# Patient Record
Sex: FEMALE | Race: WHITE | NOT HISPANIC OR LATINO | Employment: UNEMPLOYED | URBAN - METROPOLITAN AREA
[De-identification: names, ages, dates, MRNs, and addresses within clinical notes are randomized per-mention and may not be internally consistent; named-entity substitution may affect disease eponyms.]

---

## 2018-03-26 ENCOUNTER — HISTORICAL (OUTPATIENT)
Dept: ADMINISTRATIVE | Facility: HOSPITAL | Age: 43
End: 2018-03-26

## 2018-08-07 ENCOUNTER — HISTORICAL (OUTPATIENT)
Dept: ADMINISTRATIVE | Facility: HOSPITAL | Age: 43
End: 2018-08-07

## 2018-08-07 LAB
ABS NEUT (OLG): 6.3
ALBUMIN SERPL-MCNC: 4.2 GM/DL (ref 3.4–5)
ALBUMIN/GLOB SERPL: 1.68 {RATIO} (ref 1.5–2.5)
ALP SERPL-CCNC: 72 UNIT/L (ref 38–126)
ALT SERPL-CCNC: 22 UNIT/L (ref 7–52)
APPEARANCE, UA: NORMAL
AST SERPL-CCNC: 16 UNIT/L (ref 15–37)
BACTERIA #/AREA URNS AUTO: NORMAL /HPF
BILIRUB SERPL-MCNC: 0.4 MG/DL (ref 0.2–1)
BILIRUB UR QL STRIP: NEGATIVE MG/DL
BILIRUBIN DIRECT+TOT PNL SERPL-MCNC: 0.1 MG/DL (ref 0–0.5)
BILIRUBIN DIRECT+TOT PNL SERPL-MCNC: 0.3 MG/DL
BUN SERPL-MCNC: 16 MG/DL (ref 7–18)
CALCIUM SERPL-MCNC: 8.5 MG/DL (ref 8.5–10)
CHLORIDE SERPL-SCNC: 107 MMOL/L (ref 98–107)
CHOLEST SERPL-MCNC: 265 MG/DL (ref 0–200)
CHOLEST/HDLC SERPL: 5.9 {RATIO}
CO2 SERPL-SCNC: 23 MMOL/L (ref 21–32)
COLOR UR: YELLOW
CREAT SERPL-MCNC: 0.86 MG/DL (ref 0.6–1.3)
ERYTHROCYTE [DISTWIDTH] IN BLOOD BY AUTOMATED COUNT: 12.9 % (ref 11.5–17)
GLOBULIN SER-MCNC: 2.6 GM/DL (ref 1.2–3)
GLUCOSE (UA): NEGATIVE MG/DL
GLUCOSE SERPL-MCNC: 111 MG/DL (ref 74–106)
HCT VFR BLD AUTO: 42.7 % (ref 37–47)
HDLC SERPL-MCNC: 45 MG/DL (ref 35–60)
HGB BLD-MCNC: 14.4 GM/DL (ref 12–16)
HGB UR QL STRIP: NEGATIVE UNIT/L
KETONES UR QL STRIP: NEGATIVE MG/DL
LDLC SERPL CALC-MCNC: 153 MG/DL (ref 0–129)
LEUKOCYTE ESTERASE UR QL STRIP: NEGATIVE UNIT/L
LYMPHOCYTES # BLD AUTO: 3.1 X10(3)/MCL (ref 0.6–3.4)
LYMPHOCYTES NFR BLD AUTO: 31.5 % (ref 13–40)
MCH RBC QN AUTO: 31.6 PG (ref 27–31.2)
MCHC RBC AUTO-ENTMCNC: 34 GM/DL (ref 32–36)
MCV RBC AUTO: 94 FL (ref 80–94)
MONOCYTES # BLD AUTO: 0.5 X10(3)/MCL (ref 0–1.8)
MONOCYTES NFR BLD AUTO: 5.1 % (ref 0.1–24)
NEUTROPHILS NFR BLD AUTO: 63.4 % (ref 47–80)
NITRITE UR QL STRIP.AUTO: NEGATIVE
PH UR STRIP: 6 [PH]
PLATELET # BLD AUTO: 181 X10(3)/MCL (ref 130–400)
PMV BLD AUTO: 10.6 FL
POTASSIUM SERPL-SCNC: 4.8 MMOL/L (ref 3.5–5.1)
PROT SERPL-MCNC: 6.7 GM/DL (ref 6.4–8.2)
PROT UR QL STRIP: NEGATIVE MG/DL
RBC # BLD AUTO: 4.55 X10(6)/MCL (ref 4.2–5.4)
RBC #/AREA URNS HPF: NORMAL /HPF
SODIUM SERPL-SCNC: 141 MMOL/L (ref 136–145)
SP GR UR STRIP: 1.02
SQUAMOUS EPITHELIAL, UA: NORMAL /LPF
TRIGL SERPL-MCNC: 280 MG/DL (ref 30–150)
TSH SERPL-ACNC: 1.68 MIU/ML (ref 0.35–4.94)
UROBILINOGEN UR STRIP-ACNC: 0.2 MG/DL
VLDLC SERPL CALC-MCNC: 56 MG/DL
WBC # SPEC AUTO: 9.9 X10(3)/MCL (ref 4.5–11.5)
WBC #/AREA URNS AUTO: NORMAL /[HPF]

## 2019-10-08 ENCOUNTER — HISTORICAL (OUTPATIENT)
Dept: ADMINISTRATIVE | Facility: HOSPITAL | Age: 44
End: 2019-10-08

## 2019-10-08 LAB
BUN SERPL-MCNC: 11 MG/DL (ref 7–18)
CALCIUM SERPL-MCNC: 9.2 MG/DL (ref 8.5–10)
CHLORIDE SERPL-SCNC: 103 MMOL/L (ref 98–107)
CO2 SERPL-SCNC: 31 MMOL/L (ref 21–32)
CREAT SERPL-MCNC: 0.93 MG/DL (ref 0.6–1.3)
CREAT/UREA NIT SERPL: 11.8
GLUCOSE SERPL-MCNC: 121 MG/DL (ref 74–106)
POTASSIUM SERPL-SCNC: 4.6 MMOL/L (ref 3.5–5.1)
SODIUM SERPL-SCNC: 140 MMOL/L (ref 136–145)

## 2020-11-04 ENCOUNTER — HISTORICAL (OUTPATIENT)
Dept: ADMINISTRATIVE | Facility: HOSPITAL | Age: 45
End: 2020-11-04

## 2020-11-04 LAB
ALBUMIN SERPL-MCNC: 3.5 GM/DL (ref 3.5–5)
ALBUMIN/GLOB SERPL: 0.9 RATIO (ref 1.1–2)
ALP SERPL-CCNC: 102 UNIT/L (ref 40–150)
ALT SERPL-CCNC: 27 UNIT/L (ref 0–55)
AST SERPL-CCNC: 15 UNIT/L (ref 5–34)
BILIRUB SERPL-MCNC: 0.4 MG/DL
BILIRUBIN DIRECT+TOT PNL SERPL-MCNC: 0.1 MG/DL (ref 0–0.5)
BILIRUBIN DIRECT+TOT PNL SERPL-MCNC: 0.3 MG/DL (ref 0–0.8)
BUN SERPL-MCNC: 11 MG/DL (ref 7–18.7)
CALCIUM SERPL-MCNC: 9.1 MG/DL (ref 8.4–10.2)
CHLORIDE SERPL-SCNC: 106 MMOL/L (ref 98–107)
CO2 SERPL-SCNC: 27 MMOL/L (ref 22–29)
CREAT SERPL-MCNC: 0.81 MG/DL (ref 0.55–1.02)
GLOBULIN SER-MCNC: 3.7 GM/DL (ref 2.4–3.5)
GLUCOSE SERPL-MCNC: 93 MG/DL (ref 74–100)
POTASSIUM SERPL-SCNC: 4.1 MMOL/L (ref 3.5–5.1)
PROT SERPL-MCNC: 7.2 GM/DL (ref 6.4–8.3)
SODIUM SERPL-SCNC: 140 MMOL/L (ref 136–145)
T4 FREE SERPL-MCNC: 0.88 NG/DL (ref 0.7–1.48)
TSH SERPL-ACNC: 1.42 UIU/ML (ref 0.35–4.94)

## 2020-11-30 ENCOUNTER — HISTORICAL (OUTPATIENT)
Dept: ADMINISTRATIVE | Facility: HOSPITAL | Age: 45
End: 2020-11-30

## 2020-11-30 LAB
ABS NEUT (OLG): 4.94 X10(3)/MCL (ref 2.1–9.2)
BASOPHILS # BLD AUTO: 0 X10(3)/MCL (ref 0–0.2)
BASOPHILS NFR BLD AUTO: 0 %
CHOLEST SERPL-MCNC: 197 MG/DL
CHOLEST/HDLC SERPL: 6 {RATIO} (ref 0–5)
DEPRECATED CALCIDIOL+CALCIFEROL SERPL-MC: 32.2 NG/ML (ref 30–80)
EOSINOPHIL # BLD AUTO: 0.1 X10(3)/MCL (ref 0–0.9)
EOSINOPHIL NFR BLD AUTO: 1 %
ERYTHROCYTE [DISTWIDTH] IN BLOOD BY AUTOMATED COUNT: 12.2 % (ref 11.5–14.5)
EST. AVERAGE GLUCOSE BLD GHB EST-MCNC: 111.2 MG/DL
HBA1C MFR BLD: 5.5 %
HCT VFR BLD AUTO: 45 % (ref 35–46)
HDLC SERPL-MCNC: 33 MG/DL (ref 35–60)
HGB BLD-MCNC: 14.7 GM/DL (ref 12–16)
IMM GRANULOCYTES # BLD AUTO: 0.02 10*3/UL
IMM GRANULOCYTES NFR BLD AUTO: 0 %
LDLC SERPL CALC-MCNC: 111 MG/DL (ref 50–140)
LYMPHOCYTES # BLD AUTO: 2.4 X10(3)/MCL (ref 0.6–4.6)
LYMPHOCYTES NFR BLD AUTO: 30 %
MCH RBC QN AUTO: 31.2 PG (ref 26–34)
MCHC RBC AUTO-ENTMCNC: 32.7 GM/DL (ref 31–37)
MCV RBC AUTO: 95.5 FL (ref 80–100)
MONOCYTES # BLD AUTO: 0.5 X10(3)/MCL (ref 0.1–1.3)
MONOCYTES NFR BLD AUTO: 6 %
NEUTROPHILS # BLD AUTO: 4.94 X10(3)/MCL (ref 2.1–9.2)
NEUTROPHILS NFR BLD AUTO: 62 %
PLATELET # BLD AUTO: 216 X10(3)/MCL (ref 130–400)
PMV BLD AUTO: 10.7 FL (ref 7.4–10.4)
RBC # BLD AUTO: 4.71 X10(6)/MCL (ref 4–5.2)
TRIGL SERPL-MCNC: 264 MG/DL (ref 37–140)
VLDLC SERPL CALC-MCNC: 53 MG/DL
WBC # SPEC AUTO: 8 X10(3)/MCL (ref 4.5–11)

## 2021-02-25 LAB
HUMAN PAPILLOMAVIRUS (HPV): NORMAL
PAP RECOMMENDATION EXT: ABNORMAL
PAP SMEAR: ABNORMAL

## 2021-03-29 ENCOUNTER — HISTORICAL (OUTPATIENT)
Dept: INTERNAL MEDICINE | Facility: CLINIC | Age: 46
End: 2021-03-29

## 2021-03-29 LAB
CHOLEST SERPL-MCNC: 246 MG/DL
CHOLEST/HDLC SERPL: 7 {RATIO} (ref 0–5)
HDLC SERPL-MCNC: 35 MG/DL (ref 35–60)
LDLC SERPL CALC-MCNC: 135 MG/DL (ref 50–140)
TRIGL SERPL-MCNC: 378 MG/DL (ref 37–140)
VLDLC SERPL CALC-MCNC: 76 MG/DL

## 2021-08-01 ENCOUNTER — HISTORICAL (OUTPATIENT)
Dept: LAB | Facility: HOSPITAL | Age: 46
End: 2021-08-01

## 2021-08-01 LAB
ABS NEUT (OLG): 6.6 X10(3)/MCL (ref 2.1–9.2)
ALBUMIN SERPL-MCNC: 4.1 GM/DL (ref 3.5–5)
ALBUMIN/GLOB SERPL: 1.1 RATIO (ref 1.1–2)
ALP SERPL-CCNC: 111 UNIT/L (ref 40–150)
ALT SERPL-CCNC: 42 UNIT/L (ref 0–55)
APPEARANCE, UA: NORMAL
AST SERPL-CCNC: 23 UNIT/L (ref 5–34)
BACTERIA #/AREA URNS AUTO: ABNORMAL /HPF
BASOPHILS # BLD AUTO: 0 X10(3)/MCL (ref 0–0.2)
BASOPHILS NFR BLD AUTO: 0 %
BILIRUB SERPL-MCNC: 0.5 MG/DL
BILIRUB UR QL STRIP: NEGATIVE
BILIRUBIN DIRECT+TOT PNL SERPL-MCNC: 0.2 MG/DL (ref 0–0.5)
BILIRUBIN DIRECT+TOT PNL SERPL-MCNC: 0.3 MG/DL (ref 0–0.8)
BUN SERPL-MCNC: 11.9 MG/DL (ref 7–18.7)
CALCIUM SERPL-MCNC: 10.1 MG/DL (ref 8.4–10.2)
CHLORIDE SERPL-SCNC: 105 MMOL/L (ref 98–107)
CHOLEST SERPL-MCNC: 240 MG/DL
CHOLEST/HDLC SERPL: 6 {RATIO} (ref 0–5)
CO2 SERPL-SCNC: 28 MMOL/L (ref 22–29)
COLOR UR: YELLOW
CREAT SERPL-MCNC: 0.91 MG/DL (ref 0.55–1.02)
EOSINOPHIL # BLD AUTO: 0.1 X10(3)/MCL (ref 0–0.9)
EOSINOPHIL NFR BLD AUTO: 1 %
ERYTHROCYTE [DISTWIDTH] IN BLOOD BY AUTOMATED COUNT: 13.5 % (ref 11.5–14.5)
GLOBULIN SER-MCNC: 3.7 GM/DL (ref 2.4–3.5)
GLUCOSE (UA): NEGATIVE
GLUCOSE SERPL-MCNC: 115 MG/DL (ref 74–100)
HCT VFR BLD AUTO: 44 % (ref 35–46)
HDLC SERPL-MCNC: 37 MG/DL (ref 35–60)
HGB BLD-MCNC: 14.7 GM/DL (ref 12–16)
HGB UR QL STRIP: NEGATIVE
HYALINE CASTS #/AREA URNS LPF: ABNORMAL /LPF
IMM GRANULOCYTES # BLD AUTO: 0.02 10*3/UL
IMM GRANULOCYTES NFR BLD AUTO: 0 %
KETONES UR QL STRIP: NEGATIVE
LDLC SERPL CALC-MCNC: 132 MG/DL (ref 50–140)
LEUKOCYTE ESTERASE UR QL STRIP: NEGATIVE
LYMPHOCYTES # BLD AUTO: 2.4 X10(3)/MCL (ref 0.6–4.6)
LYMPHOCYTES NFR BLD AUTO: 25 %
MCH RBC QN AUTO: 31.3 PG (ref 26–34)
MCHC RBC AUTO-ENTMCNC: 33.4 GM/DL (ref 31–37)
MCV RBC AUTO: 93.8 FL (ref 80–100)
MONOCYTES # BLD AUTO: 0.6 X10(3)/MCL (ref 0.1–1.3)
MONOCYTES NFR BLD AUTO: 6 %
MUCOUS THREADS URNS QL MICRO: ABNORMAL
NEUTROPHILS # BLD AUTO: 6.6 X10(3)/MCL (ref 2.1–9.2)
NEUTROPHILS NFR BLD AUTO: 67 %
NITRITE UR QL STRIP: NEGATIVE
NRBC BLD AUTO-RTO: 0 % (ref 0–0.2)
PH UR STRIP: 6 [PH] (ref 4.5–8)
PLATELET # BLD AUTO: 211 X10(3)/MCL (ref 130–400)
PMV BLD AUTO: 10.3 FL (ref 7.4–10.4)
POTASSIUM SERPL-SCNC: 4.7 MMOL/L (ref 3.5–5.1)
PROT SERPL-MCNC: 7.8 GM/DL (ref 6.4–8.3)
PROT UR QL STRIP: NEGATIVE
RBC # BLD AUTO: 4.69 X10(6)/MCL (ref 4–5.2)
RBC #/AREA URNS AUTO: ABNORMAL /HPF
SODIUM SERPL-SCNC: 140 MMOL/L (ref 136–145)
SP GR UR STRIP: 1.02 (ref 1–1.03)
SQUAMOUS #/AREA URNS LPF: >100 /LPF
TRIGL SERPL-MCNC: 357 MG/DL (ref 37–140)
UROBILINOGEN UR STRIP-ACNC: NORMAL
VLDLC SERPL CALC-MCNC: 71 MG/DL
WBC # SPEC AUTO: 9.8 X10(3)/MCL (ref 4.5–11)
WBC #/AREA URNS AUTO: ABNORMAL /HPF

## 2021-08-16 ENCOUNTER — HISTORICAL (OUTPATIENT)
Dept: RADIOLOGY | Facility: HOSPITAL | Age: 46
End: 2021-08-16

## 2022-04-10 ENCOUNTER — HISTORICAL (OUTPATIENT)
Dept: ADMINISTRATIVE | Facility: HOSPITAL | Age: 47
End: 2022-04-10
Payer: MEDICAID

## 2022-04-24 VITALS
SYSTOLIC BLOOD PRESSURE: 100 MMHG | OXYGEN SATURATION: 98 % | DIASTOLIC BLOOD PRESSURE: 81 MMHG | BODY MASS INDEX: 38.26 KG/M2 | HEIGHT: 68 IN | WEIGHT: 252.44 LBS

## 2022-05-03 NOTE — HISTORICAL [facility]ER
This is a historical note converted from Adrianne. Formatting and pictures may have been removed.  Please reference Adrianne for original formatting and attached multimedia. Chief Complaint  HIGH BLOOD PRESSURE/ EVAL R THUMB  History of Present Illness  The patient is a 43 year old female who presents today with symptoms of musculoskeletal pain?located?in the area?of the?right thumb.? This?is a acute issue.??Quality is aching/sharp.? Severity is?8 out of 10.??Duration of symptoms?is 2 weeks. Symptoms are constant.? The patient report no?weakness. ?The patient reports?no focal neurologic deficits. ?The patient reports no?numbness. ?Injury was reported.??Associated symptoms include pain.? Alleviating and aggrevating factors include, alleviating ice and NSAIDs, aggravating?range of motion.? Prior treatment?over-the-counter has been?with?NSAIDs.?? Other providers?the patient has seen for this issue?include??no one.? Reports opening a trash can lid?and falling forward, at that time her thumb bent all the way back to her wrist?and she possibly felt a pop.? A walk-in clinic diagnosed her with a distal tuft fracture?but she has had continued? pain?at the?proximal metacarpal phalangeal?joint?laterally?to palpation and 2 distraction.  ?   The patient is also?here in clinic for evaluation of hypertension.? The patient reports home blood pressures of 150s over 100s.??She reports being off of her medications ?1 year, which included lisinopril 20 mg daily.??? The patient reports following a low-sodium diet.? The patient reports some cardiovascular exercise implementation.? There is no chest pain or shortness of breath reported with exercise.? She desires a restart of her lisinopril 20 mg.  ?  Review of Systems  Constitutional:?no weight gain,?no weight loss,?no fatigue,?no fever,?no chills,?no weakness,?no trouble sleeping.  Throat:?no bleeding,?no dentures,?no sore tongue,?no dry mouth,?no sore throat,?no hoarseness,?no thrush,?no  non-healing sores.  Cardiovascular:?no chest pain or discomfort,?no tightness,?no palpitations,?no SOB with activity,?no difficulty breathing while supine,?no swelling,?no sudden awakening from sleep with SOB.  Respiratory:??no cough,?no sputum,?no coughing up blood,?no SOB,?no wheezing,?no painful breathing.  Gastrointestinal:?no swallowing difficulty,?no heartburn,?no change in appetite,?no nausea,?no change in bowel habits,?no rectal bleeding,?no constipation,?no diarrhea,?no yellow eyes or skin.  Urinary:?no frequency,?no urgency,?no burning or pain,?no blood in urine,?no incontinence,?no change in urinary strength.  Musculoskeletal:?muscle or joint pain,?stiffness,?primarily?in the R?thumb?no back pain,?no redness of joints,?no swelling of joints,?no trauma.  Skin:?no rashes,?no lumps,?no itching,?no dryness,?color normal for ethnicity,?no hair or nail changes.  Neurologic:?no dizziness,?no fainting,?no seizures,?no weakness,?no numbness,?no tingling,?no tremors..  ?  Physical Exam  Vitals & Measurements  BP:?156/96?  HT:?170?cm? HT:?170?cm? WT:?109?kg? WT:?109?kg? BMI:?37.72?  VITAL SIGNS:? Reviewed.? ?  GENERAL:? In?no apparent distress.? Alert and Oriented x3  HEAD:?No signsof head trauma. Normocephalic  EYES:? Pupils?equal/round/reactive.? Extraocular motionsintact.  EARS:? Hearing?grossly intact. TMs and EAC?clear  MOUTH:? Oropharynx is clear. No erythema. No exudates  NECK:? No LAD. No JVD. No thyromegaly. No bruits  CHEST:? Chest with clear breath sounds bilaterally.? No wheezes, rales, or rhonchi. Good air movement  CARDIAC:? Regular rate and rhythm.? S1 and S2, without murmurs, gallops, or rubs.  VASCULAR:? No Edema.? Peripheral pulses normal and equal in all extremities.  ABDOMEN:? Soft, without detectable tenderness.? No sign of distention.? No? rebound or guarding, and no masses palpated.? ?Bowel Sounds present and normal x 4.  MUSCULOSKELETAL:?Decreased range of motion in flexion?extension and  rotation?of the right thumb secondary to pain.??Bony point tenderness?at distal phalanx of right thumb.??Also,?bony point tenderness?at?medial?side of?the?metacarpal?phalangeal joint.??Positive swelling and mild erythema.? Unable?to?perform?a valgus stress secondary to pain.? 5/5 strength throughout. Extremities without clubbing, cyanosis or edema.  NEUROLOGIC EXAM:? Alert and oriented x 3.? No focal sensory or strength deficits.? ?Speech normal.? Follows commands.  SKIN:? No rash or lesions.?  Assessment/Plan  1.?Benign essential HTN  ?  Essential Hypertension:?Uncontrolled currently secondary to?not being on medication.? The plan is to restart her?past lisinopril 20 mg daily.? She is neurovascularly stable?in our office today  ?   1. ?Advocate 100% compliance?with medication regimen?and?low-sodium diet  2. ?Advocate?increased?cardiovascular exercise as tolerated and educated upon  3.? 10% weight loss goal  4.? Monitor blood pressure?daily?with?an antecubital?digital?cuff  5. ?Follow-up in 6 months for?[wellness]  6. ?Future Lab:?Labs to be drawn at her next wellness?in August  ?  ?  ?  ?  Ordered:  lisinopril, 10 mg = 1 tab(s), Oral, Daily, # 90 tab(s), 1 Refill(s), Pharmacy: [id number]  Office/Outpatient Visit Level 4 Established [id number] , Benign essential HTN  Sprain of right thumb  Closed fracture of right thumb  Gamekeepers thumb, INK AMB - [facility], 03/26/18 17:19:00 CDT  ?  2.?Sprain of right thumb  ?-continue thumb spica splint until reassessed  -Concern?over possible?gamekeepers thumb secondary to mechanism of injury, reported pain in HPI, and physical exam.  -Will refer to orthopedic?hand?for evaluation and definitive care.  Ordered:  Office/Outpatient Visit Level 4 Established [id number] , Benign essential HTN  Sprain of right thumb  Closed fracture of right thumb  Gamekeepers thumb, INK AMB - [facility], 03/26/18 17:19:00 CDT  ?  3.?Closed fracture of right thumb  ?-X-ray displays a tuft fracture  of the distal phalanx.? Nondisplaced/non-angulated  -Thumb spica in place-continue wear ?4-6 weeks  Ordered:  Office/Outpatient Visit Level 4 Established [id number] PC, Benign essential HTN  Sprain of right thumb  Closed fracture of right thumb  Gamekeepers thumb, HLINK AMB - [facility], 03/26/18 17:19:00 CDT  ?  4.?Gamekeepers thumb  ?-Concern?over possible?gamekeepers thumb secondary to mechanism of injury, reported pain in HPI, and physical exam.  -Will refer to orthopedic?hand?for evaluation and definitive care.  Ordered:  External Referral, thumb fx/gamekeepers, ortho hand, aliza haro if possible, 03/26/18 17:19:00 CDT, Gamekeepers thumb  Office/Outpatient Visit Level 4 Established [id number] PC, Benign essential HTN  Sprain of right thumb  Closed fracture of right thumb  Gamekeepers thumb, HLINK AMB - [facility], 03/26/18 17:19:00 CDT  ?   Problem List/Past Medical History  Ongoing  Depression  Obesity  Tobacco user  Historical  No qualifying data  Procedure/Surgical History  Cholecystectomy, tubial.  Medications  lisinopril 10 mg oral tablet, 10 mg= 1 tab(s), Oral, Daily, 1 refills  ProAir HFA 90 mcg/inh inhalation aerosol with adapter, 2 puff(s), INH, q6hr, 5 refills  SERTRALINE TAB 100MG  Allergies  codeine?(swelling)  Social History  Tobacco  Current every day smoker, Cigarettes, 28 per day., 06/11/2017  Family History  Family history is negative

## 2022-05-17 ENCOUNTER — OFFICE VISIT (OUTPATIENT)
Dept: URGENT CARE | Facility: CLINIC | Age: 47
End: 2022-05-17
Payer: MEDICAID

## 2022-05-17 PROCEDURE — 99212 OFFICE O/P EST SF 10 MIN: CPT | Mod: PBBFAC

## 2022-05-24 ENCOUNTER — LAB VISIT (OUTPATIENT)
Dept: LAB | Facility: HOSPITAL | Age: 47
End: 2022-05-24
Payer: MEDICAID

## 2022-05-24 DIAGNOSIS — E78.5 HYPERLIPIDEMIA, UNSPECIFIED HYPERLIPIDEMIA TYPE: ICD-10-CM

## 2022-05-24 DIAGNOSIS — Z13.1 SCREENING FOR DIABETES MELLITUS: Primary | ICD-10-CM

## 2022-05-24 DIAGNOSIS — I10 HYPERTENSION, UNSPECIFIED TYPE: ICD-10-CM

## 2022-05-24 LAB
ALBUMIN SERPL-MCNC: 3.8 GM/DL (ref 3.5–5)
ALBUMIN/GLOB SERPL: 1 RATIO (ref 1.1–2)
ALP SERPL-CCNC: 101 UNIT/L (ref 40–150)
ALT SERPL-CCNC: 41 UNIT/L (ref 0–55)
APPEARANCE UR: CLEAR
AST SERPL-CCNC: 22 UNIT/L (ref 5–34)
BACTERIA #/AREA URNS AUTO: ABNORMAL /HPF
BASOPHILS # BLD AUTO: 0.03 X10(3)/MCL (ref 0–0.2)
BASOPHILS NFR BLD AUTO: 0.3 %
BILIRUB UR QL STRIP.AUTO: NEGATIVE MG/DL
BILIRUBIN DIRECT+TOT PNL SERPL-MCNC: 0.5 MG/DL
BUN SERPL-MCNC: 11.2 MG/DL (ref 7–18.7)
CALCIUM SERPL-MCNC: 9.6 MG/DL (ref 8.4–10.2)
CHLORIDE SERPL-SCNC: 106 MMOL/L (ref 98–107)
CHOLEST SERPL-MCNC: 190 MG/DL
CHOLEST/HDLC SERPL: 5 {RATIO} (ref 0–5)
CO2 SERPL-SCNC: 28 MMOL/L (ref 22–29)
COLOR UR AUTO: YELLOW
CREAT SERPL-MCNC: 0.92 MG/DL (ref 0.55–1.02)
DEPRECATED CALCIDIOL+CALCIFEROL SERPL-MC: 26.3 NG/ML (ref 30–80)
EOSINOPHIL # BLD AUTO: 0.11 X10(3)/MCL (ref 0–0.9)
EOSINOPHIL NFR BLD AUTO: 1.3 %
ERYTHROCYTE [DISTWIDTH] IN BLOOD BY AUTOMATED COUNT: 13.7 % (ref 11.5–17)
EST. AVERAGE GLUCOSE BLD GHB EST-MCNC: 114 MG/DL
GLOBULIN SER-MCNC: 3.7 GM/DL (ref 2.4–3.5)
GLUCOSE SERPL-MCNC: 124 MG/DL (ref 74–100)
GLUCOSE UR QL STRIP.AUTO: NORMAL MG/DL
HBA1C MFR BLD: 5.6 %
HCT VFR BLD AUTO: 45.3 % (ref 37–47)
HDLC SERPL-MCNC: 40 MG/DL (ref 35–60)
HGB BLD-MCNC: 15 GM/DL (ref 12–16)
HYALINE CASTS #/AREA URNS LPF: ABNORMAL /LPF
IMM GRANULOCYTES # BLD AUTO: 0.02 X10(3)/MCL (ref 0–0.02)
IMM GRANULOCYTES NFR BLD AUTO: 0.2 % (ref 0–0.43)
KETONES UR QL STRIP.AUTO: NEGATIVE MG/DL
LDLC SERPL CALC-MCNC: 100 MG/DL (ref 50–140)
LEUKOCYTE ESTERASE UR QL STRIP.AUTO: NEGATIVE UNIT/L
LYMPHOCYTES # BLD AUTO: 2.34 X10(3)/MCL (ref 0.6–4.6)
LYMPHOCYTES NFR BLD AUTO: 27.1 %
MCH RBC QN AUTO: 30.4 PG (ref 27–31)
MCHC RBC AUTO-ENTMCNC: 33.1 MG/DL (ref 33–36)
MCV RBC AUTO: 91.7 FL (ref 80–94)
MONOCYTES # BLD AUTO: 0.45 X10(3)/MCL (ref 0.1–1.3)
MONOCYTES NFR BLD AUTO: 5.2 %
MUCOUS THREADS URNS QL MICRO: ABNORMAL /LPF
NEUTROPHILS # BLD AUTO: 5.7 X10(3)/MCL (ref 2.1–9.2)
NEUTROPHILS NFR BLD AUTO: 65.9 %
NITRITE UR QL STRIP.AUTO: NEGATIVE
NRBC BLD AUTO-RTO: 0 %
PH UR STRIP.AUTO: 5.5 [PH]
PLATELET # BLD AUTO: 181 X10(3)/MCL (ref 130–400)
PMV BLD AUTO: 11 FL (ref 9.4–12.4)
POTASSIUM SERPL-SCNC: 5 MMOL/L (ref 3.5–5.1)
PROT SERPL-MCNC: 7.5 GM/DL (ref 6.4–8.3)
PROT UR QL STRIP.AUTO: NEGATIVE MG/DL
RBC # BLD AUTO: 4.94 X10(6)/MCL (ref 4.2–5.4)
RBC #/AREA URNS AUTO: ABNORMAL /HPF
RBC UR QL AUTO: NEGATIVE UNIT/L
SODIUM SERPL-SCNC: 141 MMOL/L (ref 136–145)
SP GR UR STRIP.AUTO: 1.02
SQUAMOUS #/AREA URNS LPF: ABNORMAL /HPF
TRIGL SERPL-MCNC: 252 MG/DL (ref 37–140)
TSH SERPL-ACNC: 1.79 UIU/ML (ref 0.35–4.94)
UROBILINOGEN UR STRIP-ACNC: NORMAL MG/DL
VLDLC SERPL CALC-MCNC: 50 MG/DL
WBC # SPEC AUTO: 8.6 X10(3)/MCL (ref 4.5–11.5)
WBC #/AREA URNS AUTO: ABNORMAL /HPF

## 2022-05-24 PROCEDURE — 82306 VITAMIN D 25 HYDROXY: CPT

## 2022-05-24 PROCEDURE — 85025 COMPLETE CBC W/AUTO DIFF WBC: CPT

## 2022-05-24 PROCEDURE — 80053 COMPREHEN METABOLIC PANEL: CPT

## 2022-05-24 PROCEDURE — 36415 COLL VENOUS BLD VENIPUNCTURE: CPT

## 2022-05-24 PROCEDURE — 84443 ASSAY THYROID STIM HORMONE: CPT

## 2022-05-24 PROCEDURE — 81001 URINALYSIS AUTO W/SCOPE: CPT

## 2022-05-24 PROCEDURE — 83036 HEMOGLOBIN GLYCOSYLATED A1C: CPT

## 2022-05-24 PROCEDURE — 80061 LIPID PANEL: CPT

## 2022-05-25 ENCOUNTER — OFFICE VISIT (OUTPATIENT)
Dept: INTERNAL MEDICINE | Facility: CLINIC | Age: 47
End: 2022-05-25
Payer: MEDICAID

## 2022-05-25 VITALS
OXYGEN SATURATION: 95 % | HEART RATE: 85 BPM | WEIGHT: 264.75 LBS | TEMPERATURE: 98 F | RESPIRATION RATE: 20 BRPM | BODY MASS INDEX: 40.12 KG/M2 | DIASTOLIC BLOOD PRESSURE: 82 MMHG | SYSTOLIC BLOOD PRESSURE: 118 MMHG | HEIGHT: 68 IN

## 2022-05-25 DIAGNOSIS — I10 HYPERTENSION, UNSPECIFIED TYPE: Primary | ICD-10-CM

## 2022-05-25 DIAGNOSIS — G47.33 OSA (OBSTRUCTIVE SLEEP APNEA): ICD-10-CM

## 2022-05-25 DIAGNOSIS — R10.13 EPIGASTRIC ABDOMINAL PAIN: ICD-10-CM

## 2022-05-25 DIAGNOSIS — E55.9 VITAMIN D INSUFFICIENCY: ICD-10-CM

## 2022-05-25 DIAGNOSIS — E78.1 HYPERTRIGLYCERIDEMIA: ICD-10-CM

## 2022-05-25 DIAGNOSIS — L02.422 FURUNCLE OF LEFT AXILLA: ICD-10-CM

## 2022-05-25 DIAGNOSIS — Z12.11 COLON CANCER SCREENING: ICD-10-CM

## 2022-05-25 DIAGNOSIS — Z72.0 TOBACCO USER: ICD-10-CM

## 2022-05-25 PROCEDURE — 3008F BODY MASS INDEX DOCD: CPT | Mod: CPTII,,,

## 2022-05-25 PROCEDURE — 3079F PR MOST RECENT DIASTOLIC BLOOD PRESSURE 80-89 MM HG: ICD-10-PCS | Mod: CPTII,,,

## 2022-05-25 PROCEDURE — 3074F PR MOST RECENT SYSTOLIC BLOOD PRESSURE < 130 MM HG: ICD-10-PCS | Mod: CPTII,,,

## 2022-05-25 PROCEDURE — 4010F PR ACE/ARB THEARPY RXD/TAKEN: ICD-10-PCS | Mod: CPTII,,,

## 2022-05-25 PROCEDURE — 4010F ACE/ARB THERAPY RXD/TAKEN: CPT | Mod: CPTII,,,

## 2022-05-25 PROCEDURE — 3008F PR BODY MASS INDEX (BMI) DOCUMENTED: ICD-10-PCS | Mod: CPTII,,,

## 2022-05-25 PROCEDURE — 99214 PR OFFICE/OUTPT VISIT, EST, LEVL IV, 30-39 MIN: ICD-10-PCS | Mod: S$PBB,,,

## 2022-05-25 PROCEDURE — 1159F PR MEDICATION LIST DOCUMENTED IN MEDICAL RECORD: ICD-10-PCS | Mod: CPTII,,,

## 2022-05-25 PROCEDURE — 99214 OFFICE O/P EST MOD 30 MIN: CPT | Mod: S$PBB,,,

## 2022-05-25 PROCEDURE — 1160F RVW MEDS BY RX/DR IN RCRD: CPT | Mod: CPTII,,,

## 2022-05-25 PROCEDURE — 3079F DIAST BP 80-89 MM HG: CPT | Mod: CPTII,,,

## 2022-05-25 PROCEDURE — 3074F SYST BP LT 130 MM HG: CPT | Mod: CPTII,,,

## 2022-05-25 PROCEDURE — 1159F MED LIST DOCD IN RCRD: CPT | Mod: CPTII,,,

## 2022-05-25 PROCEDURE — 99215 OFFICE O/P EST HI 40 MIN: CPT | Mod: PBBFAC

## 2022-05-25 PROCEDURE — 1160F PR REVIEW ALL MEDS BY PRESCRIBER/CLIN PHARMACIST DOCUMENTED: ICD-10-PCS | Mod: CPTII,,,

## 2022-05-25 RX ORDER — PRAVASTATIN SODIUM 40 MG/1
40 TABLET ORAL NIGHTLY
Qty: 90 TABLET | Refills: 2 | Status: SHIPPED | OUTPATIENT
Start: 2022-05-25 | End: 2022-12-16 | Stop reason: SDUPTHER

## 2022-05-25 RX ORDER — OXYBUTYNIN CHLORIDE 5 MG/1
5 TABLET ORAL 3 TIMES DAILY PRN
Qty: 60 TABLET | Refills: 1 | Status: SHIPPED | OUTPATIENT
Start: 2022-05-25 | End: 2022-12-16

## 2022-05-25 RX ORDER — LISINOPRIL 10 MG/1
10 TABLET ORAL DAILY
Qty: 90 TABLET | Refills: 2 | Status: SHIPPED | OUTPATIENT
Start: 2022-05-25 | End: 2022-12-16 | Stop reason: SDUPTHER

## 2022-05-25 RX ORDER — ASPIRIN 325 MG
50000 TABLET, DELAYED RELEASE (ENTERIC COATED) ORAL
Qty: 8 CAPSULE | Refills: 0 | Status: SHIPPED | OUTPATIENT
Start: 2022-05-25 | End: 2022-08-25 | Stop reason: ALTCHOICE

## 2022-05-25 RX ORDER — SERTRALINE HYDROCHLORIDE 100 MG/1
200 TABLET, FILM COATED ORAL DAILY
COMMUNITY
Start: 2021-08-11 | End: 2022-05-25 | Stop reason: SDUPTHER

## 2022-05-25 RX ORDER — LISINOPRIL 10 MG/1
10 TABLET ORAL DAILY
COMMUNITY
Start: 2021-08-11 | End: 2022-05-25 | Stop reason: SDUPTHER

## 2022-05-25 RX ORDER — SERTRALINE HYDROCHLORIDE 100 MG/1
200 TABLET, FILM COATED ORAL DAILY
Qty: 90 TABLET | Refills: 2 | Status: SHIPPED | OUTPATIENT
Start: 2022-05-25 | End: 2022-12-16 | Stop reason: SDUPTHER

## 2022-05-25 RX ORDER — OXYBUTYNIN CHLORIDE 5 MG/1
5 TABLET ORAL 3 TIMES DAILY PRN
COMMUNITY
Start: 2021-08-11 | End: 2022-05-25 | Stop reason: SDUPTHER

## 2022-05-25 RX ORDER — ASPIRIN 325 MG
50000 TABLET, DELAYED RELEASE (ENTERIC COATED) ORAL DAILY
Qty: 8 CAPSULE | Refills: 0 | Status: SHIPPED | OUTPATIENT
Start: 2022-05-25 | End: 2022-05-25

## 2022-05-25 RX ORDER — PRAVASTATIN SODIUM 40 MG/1
40 TABLET ORAL DAILY
COMMUNITY
Start: 2021-08-11 | End: 2022-05-25 | Stop reason: SDUPTHER

## 2022-05-25 RX ORDER — FENOFIBRATE 54 MG/1
54 TABLET ORAL DAILY
Qty: 90 TABLET | Refills: 2 | Status: SHIPPED | OUTPATIENT
Start: 2022-05-25 | End: 2022-08-25

## 2022-05-25 RX ORDER — DOXYCYCLINE 100 MG/1
100 CAPSULE ORAL 2 TIMES DAILY
COMMUNITY
Start: 2022-05-17 | End: 2022-05-25

## 2022-05-25 RX ORDER — SULFAMETHOXAZOLE AND TRIMETHOPRIM 800; 160 MG/1; MG/1
1 TABLET ORAL 2 TIMES DAILY
COMMUNITY
Start: 2022-05-17 | End: 2022-05-25

## 2022-05-25 RX ORDER — ALBUTEROL SULFATE 90 UG/1
2 AEROSOL, METERED RESPIRATORY (INHALATION) EVERY 6 HOURS PRN
COMMUNITY
Start: 2022-05-14 | End: 2022-09-15 | Stop reason: SDUPTHER

## 2022-05-25 NOTE — ASSESSMENT & PLAN NOTE
Lab Results   Component Value Date    [id number] 26.3 (L) 05/24/2022     Educated on increasing foods high in Vitamin D such as fish oil, cod liver oil, salmon, milk fortified with vitamin D.  RX Vitamin D3 01012 IU weekly x 8 weeks.  Complete entire 8 weeks of Vitamin D prescription.  After completion of prescription (12 weeks/3 months), begin taking Vitamin D 2000 I.U. tablets daily (purchase over the counter).  Repeat Vitamin D level as ordered.

## 2022-05-25 NOTE — ASSESSMENT & PLAN NOTE
Smoking cessation discussed.   Pt not ready to quit.  Previous referred to Smoking Cessation program and declined LDCT.  Discussed benefits of quitting including improved health, decreased cardiac/vascular/pulmonary/stroke risks as well as saving money.

## 2022-05-25 NOTE — ASSESSMENT & PLAN NOTE
Lab Results   Component Value Date    .00 05/24/2022       Lab Results   Component Value Date    TRIG 252 (H) 05/24/2022       Lab Results   Component Value Date    HDL 40 05/24/2022        Lab Results   Component Value Date    CHOL 190 05/24/2022    Rx Fenofibrate.  Continue pravastatin as prescribed.   Follow a low cholesterol, low saturated fat diet with less than 200 mg of cholesterol a day.   Avoid fried foods and high saturated fats.  Add flax seed or fish oil supplements to diet.   Increase dietary fiber.   Regular exercise improves cholesterol levels.  Physical activity 5 times a week for 30 minutes per day (or 150 minutes per week).   Stressed importance of dietary modifications.

## 2022-05-25 NOTE — ASSESSMENT & PLAN NOTE
Referral to Sleep Lab.  EPWORTH SLEEPINESS SCALE 5/25/2022   Sitting and reading 3   Watching TV 3   Sitting, inactive in a public place (e.g. a theatre or a meeting) 0   As a passenger in a car for an hour without a break 3   Lying down to rest in the afternoon when circumstances permit 3   Sitting and talking to someone 0   Sitting quietly after a lunch without alcohol 0   In a car, while stopped for a few minutes in traffic 0   Total score 12

## 2022-05-25 NOTE — PROGRESS NOTES
Subjective:       Patient ID: Lina Kat is a 47 y.o. female.    Vitals:  vitals were not taken for this visit.     Chief Complaint: No chief complaint on file.    Pt left without being seen    ROS    Objective:      Physical Exam      Assessment:       No diagnosis found.      Plan:         There are no diagnoses linked to this encounter.               This encounter was created in error - please disregard.

## 2022-05-25 NOTE — ASSESSMENT & PLAN NOTE
BP- 118/82 (at goal).  Follow a low sodium (less than 2 grams of sodium per day), DASH diet.   Continue medications as prescribed.  Monitor blood pressure and report any consistent values greater than 140/90 and keep a log.  Encouraged smoking cessation to aid in BP reduction and co-morbidities.   Maintain healthy weight with a BMI goal of <30.   Aerobic exercise for 150 minutes per week (or 5 days a week for 30 minutes each day).

## 2022-05-25 NOTE — ASSESSMENT & PLAN NOTE
Was prescribed doxycycline and bactrim, did not complete regimen.  Referral to Family Medicine (minor surgery).

## 2022-05-25 NOTE — PROGRESS NOTES
"    PATIENT NAME: Lina Kat  : 1975  DATE: 22  MRN: [id number]          Reason for Visit/Chief Complaint   Establish Care, Abdominal Pain (Epigastric), Lab review, and Recurrent Skin Infections (Left axilla)       History of Present Illness (HPI)     Lina Kat is a 47 y.o. female presenting in clinic today Establish Care, Abdominal Pain (Epigastric), Lab review, and Recurrent Skin Infections (Left axilla). Previous PCP-Naye Olivares NP.  PMH of anxiety/depression (controlled), HTN, UBALDO with CPAP, tobacco use.     C/o intermittent upper abd pain with radiation to RUQ. She states she thinks she has an undiagnosed hernia. She says she can see a "bulge" in her upper abdomen when she is in certain positions. She denies any nausea or vomiting.     Triglycerides still elevated at 252. Patient admits to not eating a low fat/low sodium diet. Pravastatin was increased at last office visit, she states she takes pravastatin as prescribed    BP at goal today. She does not check her BP at home. She states she has started walking regularly since it is warmer outside.     Patient states she has a CPAP, but she has not used it in approx 2 years. She says she has not had a sleep study in approximately 10 years. She says the mask is very uncomfortable. Other mask options were discussed, she says she cannot use the nasal appliance because she is a mouth breather. Does endorse feeling excessively sleepy. She is amendable to being referred for another sleep study.     On 2022, patient visited [facility] clinic for a boil under left axilla. She states she was given doxycycline and bactrim. She states they did not tonya boil. She did not complete the antibiotic regimen. She says it made her feel "ugh." She states a friend was able to expel blood and pus from it. Denies fever.     Continues to smoke 1 ppd. Does not desire to stop at this time. Declines LDCT. Denies alcohol or illicit drug use. No vaccines due " today. PatietDenies chest pain, shortness of breath, cough, headache, dizziness, weakness, abdominal pain, nausea, vomiting, diarrhea, constipation, dysuria, depression, anxiety, SI, and HI.    Breast Cancer Screening - 8/16/2021: MMG-Negative. Continue annual screenings.  Cervical Cancer Screening- PAP 2/25/2021 - ASCUS, repeat 1 yr. Next scheduled appt on 9/1/2022.  LDCT - Declined on 12/8/2020.  Osteoporosis Screening - Deferred due to age  Colon Cancer Screening - No prior testing. Cologuard ordered.  Vaccines: Flu 11/30/2020 / Pneumonia 8/7/2018 / Shingles deferred / Tetanus 7/22/2019    Review of Systems     Review of Systems   Constitutional: Positive for activity change. Negative for unexpected weight change.   HENT: Negative for hearing loss, rhinorrhea and trouble swallowing.    Eyes: Negative for discharge and visual disturbance.   Respiratory: Negative for chest tightness and wheezing.    Cardiovascular: Negative for chest pain and palpitations.   Gastrointestinal: Negative for blood in stool, constipation, diarrhea and vomiting.   Endocrine: Negative for polydipsia and polyuria.   Genitourinary: Negative for difficulty urinating, dysuria, hematuria and menstrual problem.   Musculoskeletal: Negative for arthralgias, joint swelling and neck pain.   Skin: Negative.    Allergic/Immunologic: Negative.    Neurological: Positive for headaches. Negative for weakness.   Psychiatric/Behavioral: Negative for confusion and dysphoric mood.       Medical / Social / Family History     Past Medical History:   Diagnosis Date    Anxiety     Depression     Hyperlipidemia     Hypertension     UBALDO (obstructive sleep apnea)          Past Surgical History:   Procedure Laterality Date    CHOLECYSTECTOMY      TUBAL LIGATION           Social History  Lina Roy's  reports that she has been smoking cigarettes. She has a 15.00 pack-year smoking history. She has never used smokeless tobacco. She reports that she does not  drink alcohol and does not use drugs.    Family History  Lina Kat's family history includes Brain cancer in her maternal aunt and paternal uncle; Diabetes type II in her father; Heart attack in her father; Hypertension in her brother and mother.    Medications and Allergies     Medications  Medication List with Changes/Refills   New Medications    CHOLECALCIFEROL, VITAMIN D3, 1,250 MCG (50,000 UNIT) CAPSULE    Take 1 capsule (50,000 Units total) by mouth every 7 days.    FENOFIBRATE (TRICOR) 54 MG TABLET    Take 1 tablet (54 mg total) by mouth once daily.   Current Medications    ALBUTEROL (PROVENTIL/VENTOLIN HFA) 90 MCG/ACTUATION INHALER    Inhale 2 puffs into the lungs every 6 (six) hours as needed.   Changed and/or Refilled Medications    Modified Medication Previous Medication    LISINOPRIL 10 MG TABLET lisinopriL 10 MG tablet       Take 1 tablet (10 mg total) by mouth once daily at 6am.    Take 10 mg by mouth once daily at 6am.    OXYBUTYNIN (DITROPAN) 5 MG TAB oxybutynin (DITROPAN) 5 MG Tab       Take 1 tablet (5 mg total) by mouth 3 (three) times daily as needed (bladder spasm).    Take 5 mg by mouth 3 (three) times daily as needed.    PRAVASTATIN (PRAVACHOL) 40 MG TABLET pravastatin (PRAVACHOL) 40 MG tablet       Take 1 tablet (40 mg total) by mouth every evening.    Take 40 mg by mouth once daily at 6am.    SERTRALINE (ZOLOFT) 100 MG TABLET sertraline (ZOLOFT) 100 MG tablet       Take 2 tablets (200 mg total) by mouth once daily at 6am.    Take 200 mg by mouth once daily at 6am.   Discontinued Medications    DOXYCYCLINE (MONODOX) 100 MG CAPSULE    Take 100 mg by mouth 2 (two) times daily.    SULFAMETHOXAZOLE-TRIMETHOPRIM 800-160MG (BACTRIM DS) 800-160 MG TAB    Take 1 tablet by mouth 2 (two) times daily.         Allergies  Review of patient's allergies indicates:   Allergen Reactions    Codeine Swelling       Physical Examination     Vitals:    05/25/22 1231   BP: 118/82   Pulse: 85   Resp: 20   Temp:  98.2 °F (36.8 °C)     Physical Exam  Constitutional:       Appearance: Normal appearance. She is normal weight.   HENT:      Head: Normocephalic and atraumatic.      Right Ear: External ear normal.      Left Ear: External ear normal.      Nose: Nose normal.      Mouth/Throat:      Mouth: Mucous membranes are moist.      Pharynx: Oropharynx is clear.   Eyes:      Extraocular Movements: Extraocular movements intact.      Conjunctiva/sclera: Conjunctivae normal.      Pupils: Pupils are equal, round, and reactive to light.   Cardiovascular:      Rate and Rhythm: Normal rate and regular rhythm.      Pulses: Normal pulses.      Heart sounds: Normal heart sounds.   Pulmonary:      Effort: Pulmonary effort is normal.      Breath sounds: Normal breath sounds.   Abdominal:      General: Bowel sounds are normal.      Palpations: Abdomen is soft.   Musculoskeletal:         General: Normal range of motion.      Cervical back: Normal range of motion and neck supple.   Skin:     General: Skin is warm and dry.      Capillary Refill: Capillary refill takes less than 2 seconds.          Neurological:      General: No focal deficit present.      Mental Status: She is alert and oriented to person, place, and time.   Psychiatric:         Mood and Affect: Mood normal.         Behavior: Behavior normal.         Thought Content: Thought content normal.         Judgment: Judgment normal.           Results     Lab Results   Component Value Date    WBC 8.6 05/24/2022    RBC 4.94 05/24/2022    HGB 15.0 05/24/2022    HCT 45.3 05/24/2022    MCV 91.7 05/24/2022    MCH 30.4 05/24/2022    MCHC 33.1 05/24/2022    RDW 13.7 05/24/2022     05/24/2022    MPV 11.0 05/24/2022     Lab Results   Component Value Date     05/24/2022    K 5.0 05/24/2022    CO2 28 05/24/2022    BUN 11.2 05/24/2022    CREATININE 0.92 05/24/2022    CALCIUM 9.6 05/24/2022    ALBUMIN 3.8 05/24/2022    BILITOT 0.5 05/24/2022    ALKPHOS 101 05/24/2022    AST 22  05/24/2022    ALT 41 05/24/2022    EGFRIFAFRICA 85 (L) 08/01/2021    EGFRNONAA >60 05/24/2022     Lab Results   Component Value Date    TSH 1.7904 05/24/2022     Lab Results   Component Value Date    CHOL 190 05/24/2022    HDL 40 05/24/2022    .00 05/24/2022    TRIG 252 (H) 05/24/2022     Lab Results   Component Value Date    COLORU Yellow 08/01/2021    APPEARANCEUA Clear 05/24/2022    PHUR 6.0 08/01/2021    GLUCUA Negative 08/01/2021    KETONESU Negative 08/01/2021    OCCULTUA Negative 08/01/2021    NITRITE Negative 08/01/2021    LEUKOCYTESUR Negative 05/24/2022    RBCUA None Seen 05/24/2022    WBCUA 0-5 05/24/2022    BACTERIA None Seen 05/24/2022    HYALINECASTS None Seen 05/24/2022     No results found for: LABMICR, CREATRANDUR  Lab Results   Component Value Date    [id number] 26.3 (L) 05/24/2022     No results found for: HIV, HEPAIGM, HEPBCOREM, HEPBSAG, HEPCAB  No results found for: FITDIAG, COLOGUARD      Assessment and Plan (including Health Maintenance)     Health Maintenance Due   Topic Date Due    Hepatitis C Screening  Never done    Cervical Cancer Screening  Never done    COVID-19 Vaccine (1) Never done    HIV Screening  Never done    Colorectal Cancer Screening  Never done    Pneumococcal Vaccines (Age 0-64) (2 - PCV) 08/07/2019       Problem List Items Addressed This Visit        Cardiac/Vascular    Hypertension - Primary    Current Assessment & Plan     BP- 118/82 (at goal).  Follow a low sodium (less than 2 grams of sodium per day), DASH diet.   Continue medications as prescribed.  Monitor blood pressure and report any consistent values greater than 140/90 and keep a log.  Encouraged smoking cessation to aid in BP reduction and co-morbidities.   Maintain healthy weight with a BMI goal of <30.   Aerobic exercise for 150 minutes per week (or 5 days a week for 30 minutes each day).             Relevant Medications    lisinopriL 10 MG tablet    Hypertriglyceridemia    Current Assessment &  Plan     Lab Results   Component Value Date    .00 05/24/2022       Lab Results   Component Value Date    TRIG 252 (H) 05/24/2022       Lab Results   Component Value Date    HDL 40 05/24/2022        Lab Results   Component Value Date    CHOL 190 05/24/2022    Rx Fenofibrate.  Continue pravastatin as prescribed.   Follow a low cholesterol, low saturated fat diet with less than 200 mg of cholesterol a day.   Avoid fried foods and high saturated fats.  Add flax seed or fish oil supplements to diet.   Increase dietary fiber.   Regular exercise improves cholesterol levels.  Physical activity 5 times a week for 30 minutes per day (or 150 minutes per week).   Stressed importance of dietary modifications.             Relevant Medications    fenofibrate (TRICOR) 54 MG tablet    pravastatin (PRAVACHOL) 40 MG tablet    Other Relevant Orders    Lipid Panel       ID    Furuncle of left axilla    Current Assessment & Plan     Was prescribed doxycycline and bactrim, did not complete regimen.  Referral to Family Medicine (minor surgery).           Relevant Orders    Ambulatory referral/consult to Family Practice       Endocrine    Vitamin D insufficiency    Current Assessment & Plan     Lab Results   Component Value Date    [id number] 26.3 (L) 05/24/2022     Educated on increasing foods high in Vitamin D such as fish oil, cod liver oil, salmon, milk fortified with vitamin D.  RX Vitamin D3 63599 IU weekly x 8 weeks.  Complete entire 8 weeks of Vitamin D prescription.  After completion of prescription (12 weeks/3 months), begin taking Vitamin D 2000 I.U. tablets daily (purchase over the counter).  Repeat Vitamin D level as ordered.             Relevant Medications    cholecalciferol, vitamin D3, 1,250 mcg (50,000 unit) capsule    Other Relevant Orders    Vitamin D       GI    Colon cancer screening    Current Assessment & Plan     Cologuard ordered.           Relevant Orders    Cologuard Screening (Multitarget Stool DNA)     Epigastric abdominal pain    Current Assessment & Plan     CT abd/pelvis ordered.           Relevant Orders    CT Chest Abdomen Pelvis Without Contrast (XPD)       Other    UBALDO (obstructive sleep apnea)    Current Assessment & Plan     Referral to Sleep Lab.  EPWORTH SLEEPINESS SCALE 5/25/2022   Sitting and reading 3   Watching TV 3   Sitting, inactive in a public place (e.g. a theatre or a meeting) 0   As a passenger in a car for an hour without a break 3   Lying down to rest in the afternoon when circumstances permit 3   Sitting and talking to someone 0   Sitting quietly after a lunch without alcohol 0   In a car, while stopped for a few minutes in traffic 0   Total score 12                Relevant Orders    Ambulatory referral/consult to Sleep Disorders    TSH    T4, Free    Tobacco user    Current Assessment & Plan     Smoking cessation discussed.   Pt not ready to quit.  Previous referred to Smoking Cessation program and declined LDCT.  Discussed benefits of quitting including improved health, decreased cardiac/vascular/pulmonary/stroke risks as well as saving money.                   Health Maintenance Topics with due status: Not Due       Topic Last Completion Date    TETANUS VACCINE 07/22/2019    Influenza Vaccine 11/30/2020    Mammogram 08/16/2021    Lipid Panel 05/24/2022       Future Appointments   Date Time Provider Department Center   5/27/2022  3:15 PM [facility] [address] [facility] [place] [facility]   8/25/2022 12:30 PM [facility]   9/1/2022  9:10 AM Faina Weinstein, JAYCEE [facility]            Signature:        RAMIRO Rodgers  [facility]  [address] [id number]    Date of encounter: 5/25/22

## 2022-06-08 ENCOUNTER — PATIENT MESSAGE (OUTPATIENT)
Dept: INTERNAL MEDICINE | Facility: CLINIC | Age: 47
End: 2022-06-08
Payer: MEDICAID

## 2022-06-08 ENCOUNTER — TELEPHONE (OUTPATIENT)
Dept: INTERNAL MEDICINE | Facility: CLINIC | Age: 47
End: 2022-06-08

## 2022-06-08 DIAGNOSIS — G47.33 OSA (OBSTRUCTIVE SLEEP APNEA): Primary | ICD-10-CM

## 2022-06-10 ENCOUNTER — TELEPHONE (OUTPATIENT)
Dept: INTERNAL MEDICINE | Facility: CLINIC | Age: 47
End: 2022-06-10
Payer: MEDICAID

## 2022-06-10 ENCOUNTER — PROCEDURE VISIT (OUTPATIENT)
Dept: SLEEP MEDICINE | Facility: HOSPITAL | Age: 47
End: 2022-06-10
Payer: MEDICAID

## 2022-06-10 DIAGNOSIS — G47.33 OSA (OBSTRUCTIVE SLEEP APNEA): ICD-10-CM

## 2022-06-10 PROCEDURE — 95810 POLYSOM 6/> YRS 4/> PARAM: CPT

## 2022-06-13 ENCOUNTER — TELEPHONE (OUTPATIENT)
Dept: INTERNAL MEDICINE | Facility: CLINIC | Age: 47
End: 2022-06-13

## 2022-06-14 DIAGNOSIS — I10 PRIMARY HYPERTENSION: Primary | ICD-10-CM

## 2022-06-14 DIAGNOSIS — G47.33 OSA (OBSTRUCTIVE SLEEP APNEA): ICD-10-CM

## 2022-06-14 DIAGNOSIS — E66.01 SEVERE OBESITY (BMI >= 40): ICD-10-CM

## 2022-06-15 ENCOUNTER — OFFICE VISIT (OUTPATIENT)
Dept: FAMILY MEDICINE | Facility: CLINIC | Age: 47
End: 2022-06-15
Payer: MEDICAID

## 2022-06-15 VITALS
DIASTOLIC BLOOD PRESSURE: 88 MMHG | SYSTOLIC BLOOD PRESSURE: 133 MMHG | HEIGHT: 68 IN | HEART RATE: 68 BPM | TEMPERATURE: 99 F | BODY MASS INDEX: 40.3 KG/M2 | WEIGHT: 265.88 LBS

## 2022-06-15 DIAGNOSIS — L02.422 FURUNCLE OF LEFT AXILLA: ICD-10-CM

## 2022-06-15 DIAGNOSIS — L72.0 EPIDERMAL INCLUSION CYST: Primary | ICD-10-CM

## 2022-06-15 PROCEDURE — 99213 OFFICE O/P EST LOW 20 MIN: CPT | Mod: PBBFAC | Performed by: FAMILY MEDICINE

## 2022-06-15 NOTE — PROGRESS NOTES
Subjective:       Patient ID: Lina Kat is a 47 y.o. female.    Chief Complaint: abscess  R axilla    HPI   Pt referred to minor surgery for a recurrent cyst in her right axilla. She has had multiple rounds of antibiotics in the past. She has had some drainage in the past that was a thick white substance. She currently cannot feel the cyst.     Review of Systems  As per HPI       Objective:      Vitals:    06/15/22 1015   BP: 133/88   Pulse: 68   Temp: 98.6 °F (37 °C)       Physical Exam      Gen: alert, no acute distress  Skin: site of previous punctum in right axilla but no palpable cyst in the area  Psych: cooperative, appropriate mood and affect      Assessment/Plan:  Epidermal inclusion cyst    - no palpable cyst present at this time  - return to clinic as needed for any return of cyst       Follow up if symptoms worsen or fail to improve.

## 2022-06-20 DIAGNOSIS — Z12.12 SCREENING FOR COLORECTAL CANCER: Primary | ICD-10-CM

## 2022-06-20 DIAGNOSIS — Z12.11 SCREENING FOR COLORECTAL CANCER: Primary | ICD-10-CM

## 2022-06-22 ENCOUNTER — PATIENT MESSAGE (OUTPATIENT)
Dept: INTERNAL MEDICINE | Facility: CLINIC | Age: 47
End: 2022-06-22
Payer: MEDICAID

## 2022-06-22 DIAGNOSIS — Z12.31 BREAST CANCER SCREENING BY MAMMOGRAM: Primary | ICD-10-CM

## 2022-06-22 DIAGNOSIS — G47.33 OSA ON CPAP: Primary | ICD-10-CM

## 2022-06-30 DIAGNOSIS — G47.33 OSA ON CPAP: Primary | ICD-10-CM

## 2022-07-02 ENCOUNTER — PROCEDURE VISIT (OUTPATIENT)
Dept: SLEEP MEDICINE | Facility: HOSPITAL | Age: 47
End: 2022-07-02
Payer: MEDICAID

## 2022-07-02 DIAGNOSIS — G47.33 OSA ON CPAP: ICD-10-CM

## 2022-07-02 PROCEDURE — 95811 POLYSOM 6/>YRS CPAP 4/> PARM: CPT

## 2022-07-08 ENCOUNTER — PATIENT MESSAGE (OUTPATIENT)
Dept: INTERNAL MEDICINE | Facility: CLINIC | Age: 47
End: 2022-07-08
Payer: MEDICAID

## 2022-08-17 PROCEDURE — 82306 VITAMIN D 25 HYDROXY: CPT

## 2022-08-17 PROCEDURE — 80061 LIPID PANEL: CPT

## 2022-08-17 PROCEDURE — 84439 ASSAY OF FREE THYROXINE: CPT

## 2022-08-17 PROCEDURE — 84443 ASSAY THYROID STIM HORMONE: CPT

## 2022-08-25 ENCOUNTER — OFFICE VISIT (OUTPATIENT)
Dept: INTERNAL MEDICINE | Facility: CLINIC | Age: 47
End: 2022-08-25
Payer: MEDICAID

## 2022-08-25 VITALS — BODY MASS INDEX: 41.44 KG/M2 | HEIGHT: 67 IN | WEIGHT: 264 LBS

## 2022-08-25 DIAGNOSIS — I10 PRIMARY HYPERTENSION: ICD-10-CM

## 2022-08-25 DIAGNOSIS — E78.1 HYPERTRIGLYCERIDEMIA: ICD-10-CM

## 2022-08-25 DIAGNOSIS — E67.8 HYPERVITAMINOSIS: ICD-10-CM

## 2022-08-25 PROCEDURE — 4010F PR ACE/ARB THEARPY RXD/TAKEN: ICD-10-PCS | Mod: CPTII,95,,

## 2022-08-25 PROCEDURE — 1159F PR MEDICATION LIST DOCUMENTED IN MEDICAL RECORD: ICD-10-PCS | Mod: CPTII,95,,

## 2022-08-25 PROCEDURE — 3008F BODY MASS INDEX DOCD: CPT | Mod: CPTII,95,,

## 2022-08-25 PROCEDURE — 4010F ACE/ARB THERAPY RXD/TAKEN: CPT | Mod: CPTII,95,,

## 2022-08-25 PROCEDURE — 99214 OFFICE O/P EST MOD 30 MIN: CPT | Mod: 95,,,

## 2022-08-25 PROCEDURE — 3008F PR BODY MASS INDEX (BMI) DOCUMENTED: ICD-10-PCS | Mod: CPTII,95,,

## 2022-08-25 PROCEDURE — 1160F PR REVIEW ALL MEDS BY PRESCRIBER/CLIN PHARMACIST DOCUMENTED: ICD-10-PCS | Mod: CPTII,95,,

## 2022-08-25 PROCEDURE — 1159F MED LIST DOCD IN RCRD: CPT | Mod: CPTII,95,,

## 2022-08-25 PROCEDURE — 1160F RVW MEDS BY RX/DR IN RCRD: CPT | Mod: CPTII,95,,

## 2022-08-25 PROCEDURE — 99214 PR OFFICE/OUTPT VISIT, EST, LEVL IV, 30-39 MIN: ICD-10-PCS | Mod: 95,,,

## 2022-08-25 RX ORDER — FENOFIBRATE 120 MG/1
1 TABLET ORAL DAILY
Qty: 90 EACH | Refills: 1 | Status: SHIPPED | OUTPATIENT
Start: 2022-08-25 | End: 2022-12-16

## 2022-08-25 NOTE — ASSESSMENT & PLAN NOTE
Lab Results   Component Value Date    LDL 85.00 08/17/2022       Lab Results   Component Value Date    TRIG 305 (H) 08/17/2022       Lab Results   Component Value Date    HDL 40 08/17/2022        Lab Results   Component Value Date    CHOL 186 08/17/2022   Increase fenofibrate to 120 mg.  Continue pravastatin as prescribed.   Follow a low cholesterol, low saturated fat diet with less than 200 mg of cholesterol a day.   Avoid fried foods and high saturated fats.  Add flax seed or fish oil supplements to diet.   Increase dietary fiber.   Regular exercise improves cholesterol levels.  Physical activity 5 times a week for 30 minutes per day (or 150 minutes per week).   Stressed importance of dietary modifications.

## 2022-08-25 NOTE — ASSESSMENT & PLAN NOTE
Lab Results   Component Value Date    [id number] 119.9 (H) 08/17/2022   Hold vitamin D until rechecked.  Repeat Vitamin D level as ordered.

## 2022-08-25 NOTE — PROGRESS NOTES
PATIENT NAME: Lina Kat  : 1975  DATE: 22  MRN: [id number]          Reason for Visit/Chief Complaint   Follow-up and Labs Only       History of Present Illness (HPI)     The patient location is: Home  The chief complaint leading to consultation is: Lab review, obesity    Visit type: audiovisual    Face to Face time with patient: 25 minutes  45 minutes of total time spent on the encounter, which includes face to face time and non-face to face time preparing to see the patient (eg, review of tests), Obtaining and/or reviewing separately obtained history, Documenting clinical information in the electronic or other health record, Independently interpreting results (not separately reported) and communicating results to the patient/family/caregiver, or Care coordination (not separately reported).     Each patient to whom he or she provides medical services by telemedicine is:  (1) informed of the relationship between the physician and patient and the respective role of any other health care provider with respect to management of the patient; and (2) notified that he or she may decline to receive medical services by telemedicine and may withdraw from such care at any time.    Lina Kat is a 47 y.o. White female presenting virtually today to Follow-up and Labs Only. PMH of anxiety/depression (controlled), HTN, UBALDO with CPAP, tobacco use.      Vitamin D 119.9. Patient says she stopped taking vitamin D supplements.     Triglycerides still elevated at 305. Patient states she has changed her diet and is now attempting to eat a low sodium diet. Pravastatin was increased at last office visit, she states she takes pravastatin as prescribed.     She is concerned about weight loss. She states she is not able to lose weight. At last office visit, she was referred to [facility] bariatric medicine, but the referral was declined because they are not taking the patient's insurance currently. She is amendable to being referred  to [place]. She is currently 41.35 kg.    Continues to smoke 1 ppd. Does not desire to stop at this time. Declines LDCT. Denies alcohol or illicit drug use. No vaccines due today. PatietDenies chest pain, shortness of breath, cough, headache, dizziness, weakness, abdominal pain, nausea, vomiting, diarrhea, constipation, dysuria, depression, anxiety, SI, and HI.    Breast Cancer Screening - 8/16/2021: MMG-Negative. Continue annual screenings.  Cervical Cancer Screening- PAP 2/25/2021 - ASCUS, repeat 1 yr. Next scheduled appt on 9/1/2022.  LDCT - Declined on 12/8/2020.  Osteoporosis Screening - Deferred due to age  Colon Cancer Screening - No prior testing. Cologuard ordered.  Vaccines: Flu 11/30/2020 / Pneumonia 8/7/2018 / Shingles deferred / Tetanus 7/22/2019            Review of Systems     Review of Systems   Constitutional: Negative for activity change and unexpected weight change.   HENT: Negative for hearing loss, rhinorrhea and trouble swallowing.    Eyes: Negative for discharge and visual disturbance.   Respiratory: Negative for chest tightness and wheezing.    Cardiovascular: Negative for chest pain and palpitations.   Gastrointestinal: Negative for blood in stool, constipation, diarrhea and vomiting.   Endocrine: Negative for polydipsia and polyuria.   Genitourinary: Negative for difficulty urinating, dysuria, hematuria and menstrual problem.   Musculoskeletal: Negative for arthralgias, joint swelling and neck pain.   Skin: Negative.    Neurological: Positive for headaches. Negative for weakness.   Psychiatric/Behavioral: Negative for confusion and dysphoric mood.       Medical / Social / Family History     Past Medical History:   Diagnosis Date    Anxiety     Depression     Hyperlipidemia     Hypertension     UBALDO (obstructive sleep apnea)          Past Surgical History:   Procedure Laterality Date    CHOLECYSTECTOMY      TUBAL LIGATION           Social History  Lina Kat's  reports that she has  been smoking cigarettes. She has a 15.00 pack-year smoking history. She has never used smokeless tobacco. She reports that she does not drink alcohol and does not use drugs.    Family History  Lina Kat's family history includes Brain cancer in her maternal aunt and paternal uncle; Diabetes type II in her father; Heart attack in her father; Hypertension in her brother and mother.    Medications and Allergies     Medications  Medication List with Changes/Refills   New Medications    FENOFIBRATE 120 MG TAB    Take 1 tablet by mouth once daily.   Current Medications    ALBUTEROL (PROVENTIL/VENTOLIN HFA) 90 MCG/ACTUATION INHALER    Inhale 2 puffs into the lungs every 6 (six) hours as needed.    LISINOPRIL 10 MG TABLET    Take 1 tablet (10 mg total) by mouth once daily at 6am.    OXYBUTYNIN (DITROPAN) 5 MG TAB    Take 1 tablet (5 mg total) by mouth 3 (three) times daily as needed (bladder spasm).    PRAVASTATIN (PRAVACHOL) 40 MG TABLET    Take 1 tablet (40 mg total) by mouth every evening.    SERTRALINE (ZOLOFT) 100 MG TABLET    Take 2 tablets (200 mg total) by mouth once daily at 6am.   Discontinued Medications    CHOLECALCIFEROL, VITAMIN D3, 1,250 MCG (50,000 UNIT) CAPSULE    Take 1 capsule (50,000 Units total) by mouth every 7 days.    FENOFIBRATE (TRICOR) 54 MG TABLET    Take 1 tablet (54 mg total) by mouth once daily.         Allergies  Review of patient's allergies indicates:   Allergen Reactions    Codeine Swelling       Physical Examination   There were no vitals filed for this visit.  Physical Exam  Pulmonary:      Effort: Pulmonary effort is normal.   Neurological:      General: No focal deficit present.      Mental Status: She is alert and oriented to person, place, and time.   Psychiatric:         Mood and Affect: Mood normal.         Behavior: Behavior normal.         Thought Content: Thought content normal.         Judgment: Judgment normal.           Results     Lab Results   Component Value Date    WBC  8.6 05/24/2022    RBC 4.94 05/24/2022    HGB 15.0 05/24/2022    HCT 45.3 05/24/2022    MCV 91.7 05/24/2022    MCH 30.4 05/24/2022    MCHC 33.1 05/24/2022    RDW 13.7 05/24/2022     05/24/2022    MPV 11.0 05/24/2022     Lab Results   Component Value Date     05/24/2022    K 5.0 05/24/2022    CO2 28 05/24/2022    BUN 11.2 05/24/2022    CREATININE 0.92 05/24/2022    CALCIUM 9.6 05/24/2022    ALBUMIN 3.8 05/24/2022    BILITOT 0.5 05/24/2022    ALKPHOS 101 05/24/2022    AST 22 05/24/2022    ALT 41 05/24/2022    EGFRIFAFRICA 85 (L) 08/01/2021    EGFRNONAA >60 05/24/2022     Lab Results   Component Value Date    TSH 2.3244 08/17/2022     Lab Results   Component Value Date    CHOL 186 08/17/2022    HDL 40 08/17/2022    LDL 85.00 08/17/2022    TRIG 305 (H) 08/17/2022     Lab Results   Component Value Date    COLORU Yellow 08/01/2021    APPEARANCEUA Clear 05/24/2022    PHUR 6.0 08/01/2021    GLUCUA Negative 08/01/2021    KETONESU Negative 08/01/2021    OCCULTUA Negative 08/01/2021    NITRITE Negative 08/01/2021    LEUKOCYTESUR Negative 05/24/2022    RBCUA None Seen 05/24/2022    WBCUA 0-5 05/24/2022    BACTERIA None Seen 05/24/2022    HYALINECASTS None Seen 05/24/2022     No results found for: LABMICR, CREATRANDUR  Lab Results   Component Value Date    [id number] 119.9 (H) 08/17/2022     No results found for: HIV, HEPAIGM, HEPBCOREM, HEPBSAG, HEPCAB  No results found for: FITDIAG, COLOGUARD  No results found for: OCCBLDIA        Assessment and Plan (including Health Maintenance)     Health Maintenance Due   Topic Date Due    Hepatitis C Screening  Never done    Cervical Cancer Screening  Never done    COVID-19 Vaccine (1) Never done    HIV Screening  Never done    Colorectal Cancer Screening  Never done    Pneumococcal Vaccines (Age 0-64) (2 - PCV) 08/07/2019       Problem List Items Addressed This Visit        Cardiac/Vascular    Hypertension    Relevant Orders    CBC Auto Differential    Comprehensive  Metabolic Panel    Urinalysis, Reflex to Urine Culture Urine, Clean Catch    Microalbumin/Creatinine Ratio, Urine    Hypertriglyceridemia    Current Assessment & Plan     Lab Results   Component Value Date    LDL 85.00 08/17/2022       Lab Results   Component Value Date    TRIG 305 (H) 08/17/2022       Lab Results   Component Value Date    HDL 40 08/17/2022        Lab Results   Component Value Date    CHOL 186 08/17/2022   Increase fenofibrate to 120 mg.  Continue pravastatin as prescribed.   Follow a low cholesterol, low saturated fat diet with less than 200 mg of cholesterol a day.   Avoid fried foods and high saturated fats.  Add flax seed or fish oil supplements to diet.   Increase dietary fiber.   Regular exercise improves cholesterol levels.  Physical activity 5 times a week for 30 minutes per day (or 150 minutes per week).   Stressed importance of dietary modifications.             Relevant Medications    fenofibrate 120 mg Tab       Endocrine    BMI 40.0-44.9, adult - Primary    Current Assessment & Plan     Body mass index is 41.35 kg/m².  Goal BMI <30.  Aerobic exercise 150 minutes per week.  Avoid soda, simple sugars, sweets, excessive rice, pasta, potatoes or bread.   Choose brown options when available and portion control.  Limit fast foods and fried foods.   Choose complex carbs in moderation (ex: green, leafy vegetables, beans, oatmeal).  Eat plenty of fresh fruits and vegetables with lean meats daily.   Consider permanent healthy lifestyle changes.  Referral to Dr. Devyn Somers ([facility]) for bariatric eval.           Relevant Orders    Ambulatory referral/consult to Bariatric Surgery    Hypervitaminosis    Current Assessment & Plan     Lab Results   Component Value Date    [id number] 119.9 (H) 08/17/2022   Hold vitamin D until rechecked.  Repeat Vitamin D level as ordered.                   Health Maintenance Topics with due status: Not Due       Topic Last Completion Date    TETANUS VACCINE 07/22/2019     Influenza Vaccine 11/30/2020    Mammogram 08/17/2022    Lipid Panel 08/17/2022       Future Appointments   Date Time Provider Department Center   9/1/2022  9:10 AM JAYCEE [facility]          RTC in 4 months and prn.  Labs within a week of visit.    Signature:        RAMIRO Rodgers  [facility]  [address] [facility] [address]    Date of encounter: 8/25/22

## 2022-08-25 NOTE — ASSESSMENT & PLAN NOTE
Body mass index is 41.35 kg/m².  Goal BMI <30.  Aerobic exercise 150 minutes per week.  Avoid soda, simple sugars, sweets, excessive rice, pasta, potatoes or bread.   Choose brown options when available and portion control.  Limit fast foods and fried foods.   Choose complex carbs in moderation (ex: green, leafy vegetables, beans, oatmeal).  Eat plenty of fresh fruits and vegetables with lean meats daily.   Consider permanent healthy lifestyle changes.  Referral to Dr. Devyn Somers ([facility]) for bariatric eval.

## 2022-08-31 ENCOUNTER — PATIENT MESSAGE (OUTPATIENT)
Dept: INTERNAL MEDICINE | Facility: CLINIC | Age: 47
End: 2022-08-31
Payer: MEDICAID

## 2022-09-06 ENCOUNTER — TELEPHONE (OUTPATIENT)
Dept: INTERNAL MEDICINE | Facility: CLINIC | Age: 47
End: 2022-09-06
Payer: MEDICAID

## 2022-09-06 DIAGNOSIS — Z72.0 TOBACCO USER: Primary | ICD-10-CM

## 2022-09-06 RX ORDER — IBUPROFEN 200 MG
1 TABLET ORAL DAILY
Qty: 14 PATCH | Refills: 0 | Status: SHIPPED | OUTPATIENT
Start: 2022-10-15 | End: 2022-10-29

## 2022-09-06 RX ORDER — NICOTINE 7MG/24HR
1 PATCH, TRANSDERMAL 24 HOURS TRANSDERMAL DAILY
Qty: 14 PATCH | Refills: 0 | Status: SHIPPED | OUTPATIENT
Start: 2022-10-27 | End: 2022-11-10

## 2022-09-06 RX ORDER — IBUPROFEN 200 MG
1 TABLET ORAL DAILY
Qty: 42 PATCH | Refills: 0 | Status: SHIPPED | OUTPATIENT
Start: 2022-09-06 | End: 2022-10-18

## 2022-09-15 ENCOUNTER — PATIENT MESSAGE (OUTPATIENT)
Dept: INTERNAL MEDICINE | Facility: CLINIC | Age: 47
End: 2022-09-15
Payer: MEDICAID

## 2022-09-15 DIAGNOSIS — R06.2 WHEEZING: Primary | ICD-10-CM

## 2022-09-15 RX ORDER — ALBUTEROL SULFATE 90 UG/1
2 AEROSOL, METERED RESPIRATORY (INHALATION) EVERY 6 HOURS PRN
Qty: 18 G | Refills: 1 | Status: SHIPPED | OUTPATIENT
Start: 2022-09-15 | End: 2022-11-22 | Stop reason: SDUPTHER

## 2022-09-15 RX ORDER — ALBUTEROL SULFATE 90 UG/1
2 AEROSOL, METERED RESPIRATORY (INHALATION) EVERY 6 HOURS PRN
Qty: 18 G | Status: CANCELLED | OUTPATIENT
Start: 2022-09-15

## 2022-09-15 NOTE — TELEPHONE ENCOUNTER
Received a refill request through the fax que for Albuterol    LOV: 08/25/22  NOV: No future appointment scheduled

## 2022-09-16 ENCOUNTER — DOCUMENTATION ONLY (OUTPATIENT)
Dept: ADMINISTRATIVE | Facility: HOSPITAL | Age: 47
End: 2022-09-16
Payer: MEDICAID

## 2022-09-21 ENCOUNTER — PATIENT MESSAGE (OUTPATIENT)
Dept: INTERNAL MEDICINE | Facility: CLINIC | Age: 47
End: 2022-09-21
Payer: MEDICAID

## 2022-09-22 ENCOUNTER — OFFICE VISIT (OUTPATIENT)
Dept: INTERNAL MEDICINE | Facility: CLINIC | Age: 47
End: 2022-09-22
Payer: MEDICAID

## 2022-09-22 VITALS
OXYGEN SATURATION: 96 % | BODY MASS INDEX: 41.28 KG/M2 | WEIGHT: 263 LBS | DIASTOLIC BLOOD PRESSURE: 77 MMHG | TEMPERATURE: 98 F | HEART RATE: 91 BPM | SYSTOLIC BLOOD PRESSURE: 108 MMHG | RESPIRATION RATE: 20 BRPM | HEIGHT: 67 IN

## 2022-09-22 DIAGNOSIS — R06.2 WHEEZING: ICD-10-CM

## 2022-09-22 DIAGNOSIS — F17.200 NEEDS SMOKING CESSATION EDUCATION: ICD-10-CM

## 2022-09-22 DIAGNOSIS — J20.9 ACUTE BRONCHITIS, UNSPECIFIED ORGANISM: Primary | ICD-10-CM

## 2022-09-22 DIAGNOSIS — Z01.818 PREOPERATIVE CLEARANCE: ICD-10-CM

## 2022-09-22 PROBLEM — L02.422 FURUNCLE OF LEFT AXILLA: Status: RESOLVED | Noted: 2022-05-25 | Resolved: 2022-09-22

## 2022-09-22 PROCEDURE — 3074F PR MOST RECENT SYSTOLIC BLOOD PRESSURE < 130 MM HG: ICD-10-PCS | Mod: CPTII,,,

## 2022-09-22 PROCEDURE — 1159F PR MEDICATION LIST DOCUMENTED IN MEDICAL RECORD: ICD-10-PCS | Mod: CPTII,,,

## 2022-09-22 PROCEDURE — 4010F PR ACE/ARB THEARPY RXD/TAKEN: ICD-10-PCS | Mod: CPTII,,,

## 2022-09-22 PROCEDURE — 99214 PR OFFICE/OUTPT VISIT, EST, LEVL IV, 30-39 MIN: ICD-10-PCS | Mod: S$PBB,,,

## 2022-09-22 PROCEDURE — 1159F MED LIST DOCD IN RCRD: CPT | Mod: CPTII,,,

## 2022-09-22 PROCEDURE — 3074F SYST BP LT 130 MM HG: CPT | Mod: CPTII,,,

## 2022-09-22 PROCEDURE — 3008F PR BODY MASS INDEX (BMI) DOCUMENTED: ICD-10-PCS | Mod: CPTII,,,

## 2022-09-22 PROCEDURE — 99215 OFFICE O/P EST HI 40 MIN: CPT | Mod: PBBFAC,25

## 2022-09-22 PROCEDURE — 3078F DIAST BP <80 MM HG: CPT | Mod: CPTII,,,

## 2022-09-22 PROCEDURE — 3078F PR MOST RECENT DIASTOLIC BLOOD PRESSURE < 80 MM HG: ICD-10-PCS | Mod: CPTII,,,

## 2022-09-22 PROCEDURE — 1160F PR REVIEW ALL MEDS BY PRESCRIBER/CLIN PHARMACIST DOCUMENTED: ICD-10-PCS | Mod: CPTII,,,

## 2022-09-22 PROCEDURE — 3008F BODY MASS INDEX DOCD: CPT | Mod: CPTII,,,

## 2022-09-22 PROCEDURE — 96372 THER/PROPH/DIAG INJ SC/IM: CPT | Mod: PBBFAC

## 2022-09-22 PROCEDURE — 99214 OFFICE O/P EST MOD 30 MIN: CPT | Mod: S$PBB,,,

## 2022-09-22 PROCEDURE — 1160F RVW MEDS BY RX/DR IN RCRD: CPT | Mod: CPTII,,,

## 2022-09-22 PROCEDURE — 4010F ACE/ARB THERAPY RXD/TAKEN: CPT | Mod: CPTII,,,

## 2022-09-22 RX ORDER — METHYLPREDNISOLONE 4 MG/1
TABLET ORAL
Qty: 21 EACH | Refills: 0 | Status: SHIPPED | OUTPATIENT
Start: 2022-09-22 | End: 2022-10-13

## 2022-09-22 RX ORDER — ALBUTEROL SULFATE 90 UG/1
2 INHALANT RESPIRATORY (INHALATION) EVERY 6 HOURS PRN
Qty: 18 G | Refills: 1 | OUTPATIENT
Start: 2022-09-22

## 2022-09-22 RX ORDER — GUAIFENESIN/DEXTROMETHORPHAN 100-10MG/5
5 SYRUP ORAL EVERY 4 HOURS PRN
Qty: 240 ML | Refills: 1 | Status: SHIPPED | OUTPATIENT
Start: 2022-09-22 | End: 2022-10-02

## 2022-09-22 RX ORDER — DEXAMETHASONE SODIUM PHOSPHATE 4 MG/ML
8 INJECTION, SOLUTION INTRA-ARTICULAR; INTRALESIONAL; INTRAMUSCULAR; INTRAVENOUS; SOFT TISSUE
Status: COMPLETED | OUTPATIENT
Start: 2022-09-22 | End: 2022-09-22

## 2022-09-22 RX ADMIN — DEXAMETHASONE SODIUM PHOSPHATE 8 MG: 4 INJECTION, SOLUTION INTRA-ARTICULAR; INTRALESIONAL; INTRAMUSCULAR; INTRAVENOUS; SOFT TISSUE at 09:09

## 2022-09-22 NOTE — ASSESSMENT & PLAN NOTE
In anticipation of gastric sleeve, referrals placed to [facility] GI, pulmonology, and psychiatry clinics.  2V CXR and PFT ordered.

## 2022-09-22 NOTE — ASSESSMENT & PLAN NOTE
Dexmethasone 8mg IM X1 in clinic.  Rx medrol dose pack.  Rx dextomethorphan-guaifenesin  Increase fluid intake.  Notify clinic if symptoms worsen.

## 2022-09-22 NOTE — PROGRESS NOTES
"    PATIENT NAME: Lina Kat  : 1975  DATE: 22  MRN: [id number]          Reason for Visit/Chief Complaint   Referral, Sinusitis (Chest congestion), and Cough       History of Present Illness (HPI)     Lina Kat is a 47 y.o. female presenting in clinic today Referral, Sinusitis (Chest congestion), and Cough. Previous PCP-Naye Olivares NP.  PMH of anxiety/depression (controlled), HTN, UBALDO with CPAP, tobacco use.     She states she has been experiencing a dry cough for approximately 11 days. She endorses having wheezing. She denies fever. Denies chills or body aches. Denies any sick contacts. She says symptoms are persistent from when she had bronchitis previously.    She is requesting surgical clearance for pending gastric sleeve surgery.    Continues to smoke 1 ppd. She has a desire to quit and was ordered nicotine patches. Denies alcohol or illicit drug use. No vaccines due today. PatietDenies chest pain, shortness of breath, cough, headache, dizziness, weakness, abdominal pain, nausea, vomiting, diarrhea, constipation, dysuria, depression, anxiety, SI, and HI.    Breast Cancer Screening - 2022: MMG-Negative. Continue annual screenings.  Cervical Cancer Screening- PAP 2021 - ASCUS, repeat 1 yr. She was a "no show" on 2022.  LDCT - Declined on 2020.  Osteoporosis Screening - Deferred due to age  Colon Cancer Screening - No prior testing. Cologuard ordered, has no returned..  Vaccines: Flu 2020 / Pneumonia 2018 / Shingles deferred / Tetanus 2019    Review of Systems     Review of Systems   Constitutional: Negative.  Negative for unexpected weight change.   HENT:  Negative for hearing loss, rhinorrhea and trouble swallowing.    Eyes:  Negative for discharge and visual disturbance.   Respiratory:  Positive for cough and wheezing. Negative for chest tightness.    Cardiovascular:  Negative for chest pain and palpitations.   Gastrointestinal:  Negative for blood in stool, " constipation, diarrhea and vomiting.   Endocrine: Negative for polydipsia and polyuria.   Genitourinary:  Negative for difficulty urinating, dysuria, hematuria and menstrual problem.   Musculoskeletal:  Negative for arthralgias, joint swelling and neck pain.   Skin: Negative.    Allergic/Immunologic: Negative.    Neurological: Negative.  Negative for weakness.   Psychiatric/Behavioral:  Negative for confusion and dysphoric mood.      Medical / Social / Family History     Past Medical History:   Diagnosis Date    Anxiety     Depression     Hyperlipidemia     Hypertension     UABLDO (obstructive sleep apnea)          Past Surgical History:   Procedure Laterality Date    CHOLECYSTECTOMY      TUBAL LIGATION           Social History  Lina Iverson  reports that she has been smoking cigarettes. She has a 15.00 pack-year smoking history. She has never used smokeless tobacco. She reports that she does not drink alcohol and does not use drugs.    Family History  Lina Quans family history includes Brain cancer in her maternal aunt and paternal uncle; Diabetes type II in her father; Heart attack in her father; Hypertension in her brother and mother.    Medications and Allergies     Medications  Medication List with Changes/Refills   New Medications    DEXTROMETHORPHAN-GUAIFENESIN  MG/5 ML (ROBITUSSIN-DM)  MG/5 ML LIQUID    Take 5 mLs by mouth every 4 (four) hours as needed (cough).    METHYLPREDNISOLONE (MEDROL DOSEPACK) 4 MG TABLET    use as directed   Current Medications    ALBUTEROL (PROVENTIL/VENTOLIN HFA) 90 MCG/ACTUATION INHALER    Inhale 2 puffs into the lungs every 6 (six) hours as needed for Wheezing or Shortness of Breath.    FENOFIBRATE 120 MG TAB    Take 1 tablet by mouth once daily.    LISINOPRIL 10 MG TABLET    Take 1 tablet (10 mg total) by mouth once daily at 6am.    NICOTINE (NICODERM CQ) 14 MG/24 HR    Place 1 patch onto the skin once daily. for 14 days    NICOTINE (NICODERM CQ) 21 MG/24 HR    Place  1 patch onto the skin once daily.    NICOTINE (NICODERM CQ) 7 MG/24 HR    Place 1 patch onto the skin once daily. for 14 days    OXYBUTYNIN (DITROPAN) 5 MG TAB    Take 1 tablet (5 mg total) by mouth 3 (three) times daily as needed (bladder spasm).    PRAVASTATIN (PRAVACHOL) 40 MG TABLET    Take 1 tablet (40 mg total) by mouth every evening.    SERTRALINE (ZOLOFT) 100 MG TABLET    Take 2 tablets (200 mg total) by mouth once daily at 6am.         Allergies  Review of patient's allergies indicates:   Allergen Reactions    Codeine Swelling       Physical Examination     Vitals:    09/22/22 0907   BP: 108/77   Pulse: 91   Resp: 20   Temp: 98 °F (36.7 °C)       Physical Exam  Vitals reviewed.   Constitutional:       Appearance: Normal appearance. She is normal weight.   HENT:      Head: Normocephalic and atraumatic.      Right Ear: External ear normal.      Left Ear: External ear normal.      Nose: Nose normal.      Mouth/Throat:      Mouth: Mucous membranes are moist.      Pharynx: Oropharynx is clear.   Eyes:      Extraocular Movements: Extraocular movements intact.      Conjunctiva/sclera: Conjunctivae normal.      Pupils: Pupils are equal, round, and reactive to light.   Cardiovascular:      Rate and Rhythm: Normal rate and regular rhythm.      Pulses: Normal pulses.      Heart sounds: Normal heart sounds.   Pulmonary:      Effort: Pulmonary effort is normal.      Breath sounds: Examination of the right-lower field reveals wheezing. Examination of the left-lower field reveals wheezing. Wheezing present.   Abdominal:      General: Bowel sounds are normal.      Palpations: Abdomen is soft.   Musculoskeletal:         General: Normal range of motion.      Cervical back: Normal range of motion and neck supple.   Skin:     General: Skin is warm and dry.      Capillary Refill: Capillary refill takes less than 2 seconds.          Neurological:      General: No focal deficit present.      Mental Status: She is alert and  oriented to person, place, and time.   Psychiatric:         Mood and Affect: Mood normal.         Behavior: Behavior normal.         Thought Content: Thought content normal.         Judgment: Judgment normal.         Results     Lab Results   Component Value Date    WBC 8.6 05/24/2022    RBC 4.94 05/24/2022    HGB 15.0 05/24/2022    HCT 45.3 05/24/2022    MCV 91.7 05/24/2022    MCH 30.4 05/24/2022    MCHC 33.1 05/24/2022    RDW 13.7 05/24/2022     05/24/2022    MPV 11.0 05/24/2022     Lab Results   Component Value Date     05/24/2022    K 5.0 05/24/2022    CO2 28 05/24/2022    BUN 11.2 05/24/2022    CREATININE 0.92 05/24/2022    CALCIUM 9.6 05/24/2022    ALBUMIN 3.8 05/24/2022    BILITOT 0.5 05/24/2022    ALKPHOS 101 05/24/2022    AST 22 05/24/2022    ALT 41 05/24/2022    EGFRIFAFRICA 85 (L) 08/01/2021    EGFRNONAA >60 05/24/2022     Lab Results   Component Value Date    TSH 2.3244 08/17/2022     Lab Results   Component Value Date    CHOL 186 08/17/2022    HDL 40 08/17/2022    LDL 85.00 08/17/2022    TRIG 305 (H) 08/17/2022     Lab Results   Component Value Date    COLORU Yellow 08/01/2021    APPEARANCEUA Clear 05/24/2022    PHUR 6.0 08/01/2021    GLUCUA Negative 08/01/2021    KETONESU Negative 08/01/2021    OCCULTUA Negative 08/01/2021    NITRITE Negative 08/01/2021    LEUKOCYTESUR Negative 05/24/2022    RBCUA None Seen 05/24/2022    WBCUA 0-5 05/24/2022    BACTERIA None Seen 05/24/2022    HYALINECASTS None Seen 05/24/2022     No results found for: LABMICR, CREATRANDUR  Lab Results   Component Value Date    [id number] 119.9 (H) 08/17/2022     No results found for: HIV, HEPAIGM, HEPBCOREM, HEPBSAG, HEPCAB  No results found for: FITDIAG, COLOGUARD      Assessment and Plan (including Health Maintenance)     Health Maintenance Due   Topic Date Due    Hepatitis C Screening  Never done    COVID-19 Vaccine (1) Never done    HIV Screening  Never done    Colorectal Cancer Screening  Never done    Pneumococcal  Vaccines (Age 0-64) (2 - PCV) 08/07/2019    Influenza Vaccine (1) 09/01/2022       Problem List Items Addressed This Visit          Pulmonary    Acute bronchitis - Primary    Current Assessment & Plan     Dexmethasone 8mg IM X1 in clinic.  Rx medrol dose pack.  Rx dextomethorphan-guaifenesin  Increase fluid intake.  Notify clinic if symptoms worsen.         Relevant Medications    dexamethasone injection 8 mg (Completed)    methylPREDNISolone (MEDROL DOSEPACK) 4 mg tablet    dextromethorphan-guaiFENesin  mg/5 ml (ROBITUSSIN-DM)  mg/5 mL liquid       Endocrine    BMI 40.0-44.9, adult    Current Assessment & Plan     Body mass index is 41.18 kg/m².  Goal BMI <30.  Aerobic exercise 150 minutes per week.  Avoid soda, simple sugars, sweets, excessive rice, pasta, potatoes or bread.   Choose brown options when available and portion control.  Limit fast foods and fried foods.   Choose complex carbs in moderation (ex: green, leafy vegetables, beans, oatmeal).  Eat plenty of fresh fruits and vegetables with lean meats daily.   Consider permanent healthy lifestyle changes.             Other    Preoperative clearance    Current Assessment & Plan     In anticipation of gastric sleeve, referrals placed to [facility] GI, pulmonology, and psychiatry clinics.  2V CXR and PFT ordered.         Relevant Orders    Ambulatory referral/consult to Gastroenterology    Ambulatory referral/consult to Psychiatry    Ambulatory referral/consult to Pulmonology    Pulmonary function test    X-Ray Chest PA And Lateral       Health Maintenance Topics with due status: Not Due       Topic Last Completion Date    TETANUS VACCINE 07/22/2019    Cervical Cancer Screening 02/25/2021    Mammogram 08/17/2022    Lipid Panel 08/17/2022       Future Appointments   Date Time Provider Department Center   9/30/2022  1:15 PM SURGERY CLEAR, [facility] IM RES [place]   11/30/2022  8:30 AM RAMIRO Rodgers [facility] INTMED [place]         Keep scheduled appt with me on 11/30/2022.  Complete labs within a week of visit.    Signature:        RAMIRO Rodgers  [facility]  [address] [facility] [id number]    Date of encounter: 9/22/22

## 2022-09-22 NOTE — ASSESSMENT & PLAN NOTE
Body mass index is 41.18 kg/m².  Goal BMI <30.  Aerobic exercise 150 minutes per week.  Avoid soda, simple sugars, sweets, excessive rice, pasta, potatoes or bread.   Choose brown options when available and portion control.  Limit fast foods and fried foods.   Choose complex carbs in moderation (ex: green, leafy vegetables, beans, oatmeal).  Eat plenty of fresh fruits and vegetables with lean meats daily.   Consider permanent healthy lifestyle changes.

## 2022-10-20 ENCOUNTER — PATIENT MESSAGE (OUTPATIENT)
Dept: INTERNAL MEDICINE | Facility: CLINIC | Age: 47
End: 2022-10-20
Payer: MEDICAID

## 2022-10-21 ENCOUNTER — OFFICE VISIT (OUTPATIENT)
Dept: INTERNAL MEDICINE | Facility: CLINIC | Age: 47
End: 2022-10-21
Payer: MEDICAID

## 2022-10-21 VITALS
DIASTOLIC BLOOD PRESSURE: 74 MMHG | BODY MASS INDEX: 41.18 KG/M2 | SYSTOLIC BLOOD PRESSURE: 105 MMHG | RESPIRATION RATE: 18 BRPM | TEMPERATURE: 98 F | HEART RATE: 97 BPM | WEIGHT: 262.38 LBS | HEIGHT: 67 IN

## 2022-10-21 DIAGNOSIS — Z01.818 PRE-OPERATIVE CLEARANCE: ICD-10-CM

## 2022-10-21 DIAGNOSIS — I10 HYPERTENSION, UNSPECIFIED TYPE: Primary | ICD-10-CM

## 2022-10-21 PROCEDURE — 93005 ELECTROCARDIOGRAM TRACING: CPT

## 2022-10-21 PROCEDURE — 99213 OFFICE O/P EST LOW 20 MIN: CPT | Mod: PBBFAC

## 2022-10-21 NOTE — PROGRESS NOTES
I have reviewed and concur with the resident's history, physical, assessment, and plan.  I have discussed with him all issues related to the diagnosis, workup and treatment plan.Care provided as reasonable and necessary.No hx of MI    Anton Key MD  [facility]

## 2022-10-21 NOTE — PROGRESS NOTES
Chief Complaint  Pre-op Exam (Clearance for gastric sleeve)     JOYCE Kat is a 47 y.o. female who has a past medical history of Anxiety, Depression, Hyperlipidemia, Hypertension, and UBALDO (obstructive sleep apnea).  She presents to clinic today for surgery clearance. Bariatric Sleeve in January. She recently had a PFT this week. No acute complaints from her today.    ROS  12 point ROS negative unless otherwise stated above    PE  Vitals:    10/21/22 1248   BP: 105/74   Pulse: 97   Resp: 18   Temp: 97.9 °F (36.6 °C)      Physical Exam  Vitals and nursing note reviewed.   Constitutional:       General: She is not in acute distress.     Appearance: Normal appearance. She is obese. She is not ill-appearing, toxic-appearing or diaphoretic.   HENT:      Head: Normocephalic and atraumatic.      Right Ear: External ear normal.      Left Ear: External ear normal.      Nose: Nose normal.      Mouth/Throat:      Mouth: Mucous membranes are moist.      Pharynx: Oropharynx is clear.   Eyes:      General:         Right eye: No discharge.         Left eye: No discharge.      Extraocular Movements: Extraocular movements intact.      Conjunctiva/sclera: Conjunctivae normal.      Pupils: Pupils are equal, round, and reactive to light.   Cardiovascular:      Rate and Rhythm: Normal rate and regular rhythm.      Pulses: Normal pulses.      Heart sounds: Normal heart sounds. No murmur heard.    No gallop.   Pulmonary:      Effort: Pulmonary effort is normal. No respiratory distress.      Breath sounds: Normal breath sounds. No stridor. No wheezing, rhonchi or rales.   Abdominal:      General: Bowel sounds are normal.      Palpations: Abdomen is soft.      Tenderness: There is no abdominal tenderness. There is no guarding or rebound.   Musculoskeletal:         General: No swelling. Normal range of motion.      Cervical back: Normal range of motion and neck supple.      Right lower leg: No edema.      Left lower leg: No edema.    Skin:     General: Skin is warm and dry.   Neurological:      Mental Status: She is alert and oriented to person, place, and time.        Assessment/Plan  HTN  Blood pressure well controlled. Continue current medications  EKG: NSR. NO ST abnormalities.     HLD  LDL 85. Good    UBALDO on CPAP  Continue CPAP use as directed    Tobacco Use  Smoking 1ppd/20y  Stop smoking    Pre-Operative Risk:  - Gastric Sleeve  - History of MI, Positive stress test, Nitrate use, Q Waves, or Current Angina: Get EKG today  - History or manifestations of Heart Failure: No  - History of TIA or CVA: NO  - Pre-operative treatment with insulin: No  - Pre-operative creatinine >2 mg/dL: No    RCRI = 0    Medically stable for Proposed Surgery    Drake Pruitt DO  Internal Medicine - PGY-2

## 2022-11-22 ENCOUNTER — TELEPHONE (OUTPATIENT)
Dept: ENDOSCOPY | Facility: HOSPITAL | Age: 47
End: 2022-11-22
Payer: MEDICAID

## 2022-11-22 DIAGNOSIS — R06.2 WHEEZING: ICD-10-CM

## 2022-11-22 NOTE — TELEPHONE ENCOUNTER
Received a refill request through the fax que for albuterol    LOV: 09/22/22  NOV: 12/15/22  Cancellations: 11/30/22

## 2022-11-23 RX ORDER — ALBUTEROL SULFATE 90 UG/1
2 AEROSOL, METERED RESPIRATORY (INHALATION) EVERY 6 HOURS PRN
Qty: 18 G | Refills: 1 | Status: SHIPPED | OUTPATIENT
Start: 2022-11-23 | End: 2022-12-16 | Stop reason: SDUPTHER

## 2022-11-29 ENCOUNTER — PATIENT MESSAGE (OUTPATIENT)
Dept: INTERNAL MEDICINE | Facility: CLINIC | Age: 47
End: 2022-11-29
Payer: MEDICAID

## 2022-12-01 ENCOUNTER — TELEPHONE (OUTPATIENT)
Dept: INTERNAL MEDICINE | Facility: CLINIC | Age: 47
End: 2022-12-01
Payer: MEDICAID

## 2022-12-01 NOTE — TELEPHONE ENCOUNTER
Called pt. Regarding message in portal about a sinus infection. States it has cleared up and feeling better.

## 2022-12-12 ENCOUNTER — APPOINTMENT (OUTPATIENT)
Dept: LAB | Facility: HOSPITAL | Age: 47
End: 2022-12-12
Payer: MEDICAID

## 2022-12-13 ENCOUNTER — TELEPHONE (OUTPATIENT)
Dept: INTERNAL MEDICINE | Facility: CLINIC | Age: 47
End: 2022-12-13
Payer: MEDICAID

## 2022-12-13 NOTE — TELEPHONE ENCOUNTER
Phoned patient to schedule  Annual Influenza Vaccine. Patient informed nurse she has an appointment Friday , December 16,2022 at 730 am with her provider JEM Esparza NP. She will ask her provider to order her Flu vaccine then and administer. Informed patient if for some reason she needs flu  Vaccine  to give me(Samantha) a call  at [phone].

## 2022-12-15 RX ORDER — IBUPROFEN 200 MG
1 TABLET ORAL DAILY
Status: ON HOLD | COMMUNITY
End: 2023-03-29 | Stop reason: HOSPADM

## 2022-12-16 ENCOUNTER — OFFICE VISIT (OUTPATIENT)
Dept: INTERNAL MEDICINE | Facility: CLINIC | Age: 47
End: 2022-12-16
Payer: MEDICAID

## 2022-12-16 VITALS
BODY MASS INDEX: 40.28 KG/M2 | OXYGEN SATURATION: 100 % | SYSTOLIC BLOOD PRESSURE: 115 MMHG | HEART RATE: 92 BPM | WEIGHT: 256.63 LBS | RESPIRATION RATE: 20 BRPM | HEIGHT: 67 IN | DIASTOLIC BLOOD PRESSURE: 85 MMHG | TEMPERATURE: 98 F

## 2022-12-16 DIAGNOSIS — L02.92 BOIL: ICD-10-CM

## 2022-12-16 DIAGNOSIS — G47.33 OSA (OBSTRUCTIVE SLEEP APNEA): ICD-10-CM

## 2022-12-16 DIAGNOSIS — R73.01 IMPAIRED FASTING GLUCOSE: ICD-10-CM

## 2022-12-16 DIAGNOSIS — Z11.59 SCREENING FOR VIRAL DISEASE: ICD-10-CM

## 2022-12-16 DIAGNOSIS — D17.20 LIPOMA OF EXTREMITY: ICD-10-CM

## 2022-12-16 DIAGNOSIS — Z23 IMMUNIZATION DUE: ICD-10-CM

## 2022-12-16 DIAGNOSIS — Z11.4 SCREENING FOR HIV (HUMAN IMMUNODEFICIENCY VIRUS): ICD-10-CM

## 2022-12-16 DIAGNOSIS — F33.42 RECURRENT MAJOR DEPRESSIVE DISORDER, IN FULL REMISSION: ICD-10-CM

## 2022-12-16 DIAGNOSIS — R06.2 WHEEZING: ICD-10-CM

## 2022-12-16 DIAGNOSIS — I10 PRIMARY HYPERTENSION: Primary | ICD-10-CM

## 2022-12-16 DIAGNOSIS — E78.1 HYPERTRIGLYCERIDEMIA: ICD-10-CM

## 2022-12-16 DIAGNOSIS — Z72.0 TOBACCO USER: ICD-10-CM

## 2022-12-16 DIAGNOSIS — N32.81 OAB (OVERACTIVE BLADDER): ICD-10-CM

## 2022-12-16 PROBLEM — J20.9 ACUTE BRONCHITIS: Status: RESOLVED | Noted: 2022-09-22 | Resolved: 2022-12-16

## 2022-12-16 PROBLEM — E67.8 HYPERVITAMINOSIS: Status: RESOLVED | Noted: 2022-08-25 | Resolved: 2022-12-16

## 2022-12-16 PROBLEM — E55.9 VITAMIN D INSUFFICIENCY: Status: RESOLVED | Noted: 2022-05-25 | Resolved: 2022-12-16

## 2022-12-16 PROCEDURE — 1160F PR REVIEW ALL MEDS BY PRESCRIBER/CLIN PHARMACIST DOCUMENTED: ICD-10-PCS | Mod: CPTII,,,

## 2022-12-16 PROCEDURE — 1159F PR MEDICATION LIST DOCUMENTED IN MEDICAL RECORD: ICD-10-PCS | Mod: CPTII,,,

## 2022-12-16 PROCEDURE — 99215 OFFICE O/P EST HI 40 MIN: CPT | Mod: 25,S$PBB,,

## 2022-12-16 PROCEDURE — 3066F PR DOCUMENTATION OF TREATMENT FOR NEPHROPATHY: ICD-10-PCS | Mod: CPTII,,,

## 2022-12-16 PROCEDURE — 4010F PR ACE/ARB THEARPY RXD/TAKEN: ICD-10-PCS | Mod: CPTII,,,

## 2022-12-16 PROCEDURE — 99215 OFFICE O/P EST HI 40 MIN: CPT | Mod: PBBFAC

## 2022-12-16 PROCEDURE — 99215 PR OFFICE/OUTPT VISIT, EST, LEVL V, 40-54 MIN: ICD-10-PCS | Mod: 25,S$PBB,,

## 2022-12-16 PROCEDURE — 3061F PR NEG MICROALBUMINURIA RESULT DOCUMENTED/REVIEW: ICD-10-PCS | Mod: CPTII,,,

## 2022-12-16 PROCEDURE — 99406 PR TOBACCO USE CESSATION INTERMEDIATE 3-10 MINUTES: ICD-10-PCS | Mod: S$PBB,,,

## 2022-12-16 PROCEDURE — 3074F SYST BP LT 130 MM HG: CPT | Mod: CPTII,,,

## 2022-12-16 PROCEDURE — 3079F DIAST BP 80-89 MM HG: CPT | Mod: CPTII,,,

## 2022-12-16 PROCEDURE — 1160F RVW MEDS BY RX/DR IN RCRD: CPT | Mod: CPTII,,,

## 2022-12-16 PROCEDURE — 90686 IIV4 VACC NO PRSV 0.5 ML IM: CPT | Mod: PBBFAC

## 2022-12-16 PROCEDURE — 3061F NEG MICROALBUMINURIA REV: CPT | Mod: CPTII,,,

## 2022-12-16 PROCEDURE — 4010F ACE/ARB THERAPY RXD/TAKEN: CPT | Mod: CPTII,,,

## 2022-12-16 PROCEDURE — 3079F PR MOST RECENT DIASTOLIC BLOOD PRESSURE 80-89 MM HG: ICD-10-PCS | Mod: CPTII,,,

## 2022-12-16 PROCEDURE — 3066F NEPHROPATHY DOC TX: CPT | Mod: CPTII,,,

## 2022-12-16 PROCEDURE — 90677 PCV20 VACCINE IM: CPT | Mod: PBBFAC

## 2022-12-16 PROCEDURE — 1159F MED LIST DOCD IN RCRD: CPT | Mod: CPTII,,,

## 2022-12-16 PROCEDURE — 99406 BEHAV CHNG SMOKING 3-10 MIN: CPT | Mod: S$PBB,,,

## 2022-12-16 PROCEDURE — 3008F BODY MASS INDEX DOCD: CPT | Mod: CPTII,,,

## 2022-12-16 PROCEDURE — 3074F PR MOST RECENT SYSTOLIC BLOOD PRESSURE < 130 MM HG: ICD-10-PCS | Mod: CPTII,,,

## 2022-12-16 PROCEDURE — 90472 IMMUNIZATION ADMIN EACH ADD: CPT | Mod: PBBFAC

## 2022-12-16 PROCEDURE — 3008F PR BODY MASS INDEX (BMI) DOCUMENTED: ICD-10-PCS | Mod: CPTII,,,

## 2022-12-16 RX ORDER — LISINOPRIL 10 MG/1
10 TABLET ORAL DAILY
Qty: 90 TABLET | Refills: 1 | Status: SHIPPED | OUTPATIENT
Start: 2022-12-16 | End: 2023-04-17

## 2022-12-16 RX ORDER — FENOFIBRATE 160 MG/1
160 TABLET ORAL DAILY
Qty: 90 TABLET | Refills: 1 | Status: SHIPPED | OUTPATIENT
Start: 2022-12-16 | End: 2023-04-17 | Stop reason: SDUPTHER

## 2022-12-16 RX ORDER — SERTRALINE HYDROCHLORIDE 100 MG/1
200 TABLET, FILM COATED ORAL DAILY
Qty: 180 TABLET | Refills: 1 | Status: SHIPPED | OUTPATIENT
Start: 2022-12-16 | End: 2023-04-17 | Stop reason: SDUPTHER

## 2022-12-16 RX ORDER — ALBUTEROL SULFATE 90 UG/1
2 AEROSOL, METERED RESPIRATORY (INHALATION) EVERY 6 HOURS PRN
Qty: 18 G | Refills: 1 | Status: SHIPPED | OUTPATIENT
Start: 2022-12-16 | End: 2023-03-10 | Stop reason: SDUPTHER

## 2022-12-16 RX ORDER — PRAVASTATIN SODIUM 40 MG/1
40 TABLET ORAL NIGHTLY
Qty: 90 TABLET | Refills: 1 | Status: SHIPPED | OUTPATIENT
Start: 2022-12-16 | End: 2023-04-17 | Stop reason: SDUPTHER

## 2022-12-16 RX ORDER — OXYBUTYNIN CHLORIDE 5 MG/1
5 TABLET ORAL 2 TIMES DAILY
Qty: 180 TABLET | Refills: 1 | Status: SHIPPED | OUTPATIENT
Start: 2022-12-16 | End: 2023-04-17 | Stop reason: SDUPTHER

## 2022-12-16 RX ORDER — DOXYCYCLINE 100 MG/1
100 CAPSULE ORAL 2 TIMES DAILY
Qty: 14 CAPSULE | Refills: 0 | Status: SHIPPED | OUTPATIENT
Start: 2022-12-16 | End: 2022-12-23

## 2022-12-16 NOTE — ASSESSMENT & PLAN NOTE
Lab Results   Component Value Date    .00 12/12/2022       Lab Results   Component Value Date    TRIG 187 (H) 12/12/2022       Lab Results   Component Value Date    HDL 42 12/12/2022        Lab Results   Component Value Date    CHOL 190 12/12/2022    Increase fenofibrate to 160 mg.  Continue pravastatin as prescribed.   Follow a low cholesterol, low saturated fat diet with less than 200 mg of cholesterol a day.   Avoid fried foods and high saturated fats.  Add flax seed or fish oil supplements to diet.   Increase dietary fiber.   Regular exercise improves cholesterol levels.  Physical activity 5 times a week for 30 minutes per day (or 150 minutes per week).   Stressed importance of dietary modifications.

## 2022-12-16 NOTE — ASSESSMENT & PLAN NOTE
Change to oxybutynin 5 mg twice daily.  Timed voiding every 2-3 hours.   Avoidance of bladder irritants such as alcohol citrus foods & drinks, chocolates, caffeinated drinks, energy drinks, spicy foods, sodas.  Increase water intake.

## 2022-12-16 NOTE — ASSESSMENT & PLAN NOTE
Controlled.  Continue sertraline - refilled today.  Read positive daily meditations, avoid negative media, set healthy boundaries.   Exercise daily, keep consistent sleep pattern, eat a healthy diet.   Establish good social support, make changes to reduce stress.   Do not drink alcohol or use illicit drugs.   Reports any symptoms of suicidal/homicidal ideations or self harm immediately, go to nearest emergency room.

## 2022-12-16 NOTE — ASSESSMENT & PLAN NOTE
Plum sized boil on left lower abdomen with erythema.  Rx doxycycline.  Apply warm compress up to 20 minutes at a time.  RTC for any fever, pus drainage, or increased pain at site.

## 2022-12-16 NOTE — ASSESSMENT & PLAN NOTE
Body mass index is 40.19 kg/m².  Goal BMI <30.  Aerobic exercise 150 minutes per week.  Avoid soda, simple sugars, sweets, excessive rice, pasta, potatoes or bread.   Choose brown options when available and portion control.  Limit fast foods and fried foods.   Choose complex carbs in moderation (ex: green, leafy vegetables, beans, oatmeal).  Eat plenty of fresh fruits and vegetables with lean meats daily.   Consider permanent healthy lifestyle changes.

## 2022-12-16 NOTE — ASSESSMENT & PLAN NOTE
Reports compliance with wearing CPAP nightly. Reports decreased EDS, snoring and fatigue. Pt is benefiting from its use. Expect lifetime use and decreased daytime sleepiness/fatigue. Avoid excessive alcohol, smoking and overuse of sedatives at bedtime.

## 2022-12-16 NOTE — ASSESSMENT & PLAN NOTE
"Vitals:    12/16/22 0739   BP: 115/85   Pulse: 92   Resp: 20   Temp: 97.8 °F (36.6 °C)   TempSrc: Oral   SpO2: 100%   Weight: 116.4 kg (256 lb 9.9 oz)   Height: 5' 7" (1.702 m)       Follow a low sodium (less than 2 grams of sodium per day), DASH diet.   Continue lisinoprils as prescribed.  Monitor blood pressure and report any consistent values greater than 140/90 and keep a log.  Encouraged smoking cessation to aid in BP reduction and co-morbidities.   Maintain healthy weight with a BMI goal of <30.   Aerobic exercise for 150 minutes per week (or 5 days a week for 30 minutes each day).   "

## 2022-12-16 NOTE — ASSESSMENT & PLAN NOTE
After obtaining informed consent, the flu and pneumococcal vaccines was administered.  Ok to take acetaminophen for soreness of arm.

## 2022-12-16 NOTE — PROGRESS NOTES
"    PATIENT NAME: Lina Kat  : 1975  DATE: 22  MRN: [id number]          Reason for Visit/Chief Complaint   Follow-up and Labs Only       History of Present Illness (HPI)     Lina Kta is a 47 y.o. female presenting in clinic today for Follow-up and Labs Only. PMH of anxiety/depression (controlled), HTN, UBALDO with CPAP, tobacco use. She is followed by Dr. Zhou Rubio for bariatric surgery, no date for surgery at this time.    All pertinent labs dated 2022 and diagnotic tests reviewed and discussed with patient. Triglycerides continues to be elevated. She voices compliance with fenofibrate and pravastatin.     She is c/o right knee edema that she noticed. Denies any injury. Denies pain. She denies numbness or tingling of RLE.    She has is complaining of a boil on her left lower abdomen. She endorses redness, denies pain. She states she popped it and only blood came out. She denies fever or any other treatment.    She is c/o overactive bladder/incontinent episodes which is worse at night. She is prescribed oxybutynin, but she states she misread the label on how to take.     Continues to smoke 1 ppd. She has a desire to quit and was ordered nicotine patches in which she says is not working. Denies alcohol or illicit drug use. Denies chest pain, shortness of breath, cough, headache, dizziness, weakness, abdominal pain, nausea, vomiting, diarrhea, constipation, dysuria, depression, anxiety, SI, and HI.    Breast Cancer Screening - 2022: [facility]-Negative. Continue annual screenings.  Cervical Cancer Screening- PAP 2021 - ASCUS, repeat 1 yr. She was a "no show" on 2022. Scheduled: 3/28/2023.  LDCT - Declines.  Osteoporosis Screening - Deferred due to age  Colon Cancer Screening - No prior testing. Cologuard ordered, has not returned.  Vaccines: Flu 2022 / Pneumonia 2018, 2022 / Tetanus 2019    Pt has 1 or more chronic illnesses with severe exacerbation, progression, or " side effects of treatment / 1 acute or chronic illness or injury that poses a threat to life or bodily function.  I have reviewed prior external notes / reviewed results of each unique test /ordered a unique test /  This assessment required an independent historian.  Management of this patient was discussed with an external physician / other qhp / or appropriate source that was not separately reported.  There is high risk of morbidity from additional diagnostic testing or treatment including prescription drug management. Diagnosis & treatment are significantly limited by social determinants of health.   I spent a total of 41 minutes reviewing the patient's chart prior to our face to face time, seeing the patient, speaking with nurse, and documenting in the record.  (Npt 60-74 mins / Est 40-54 mins)    Review of Systems     Review of Systems   Constitutional: Negative.    HENT: Negative.     Eyes: Negative.    Respiratory: Negative.     Cardiovascular: Negative.    Gastrointestinal: Negative.    Endocrine: Negative.    Genitourinary:  Positive for urgency.   Musculoskeletal: Negative.    Skin:  Positive for wound.   Allergic/Immunologic: Negative.    Neurological: Negative.    Hematological: Negative.    Psychiatric/Behavioral: Negative.  Negative for dysphoric mood. The patient is not nervous/anxious.    All other systems reviewed and are negative.    Medical / Social / Family History     Past Medical History:   Diagnosis Date    Anxiety     Depression     Hyperlipidemia     Hypertension     UBALDO (obstructive sleep apnea)          Past Surgical History:   Procedure Laterality Date    CHOLECYSTECTOMY      TUBAL LIGATION           Social History  Lina Kat's  reports that she has been smoking cigarettes. She has a 15.00 pack-year smoking history. She has never used smokeless tobacco. She reports that she does not drink alcohol and does not use drugs.    Family History  Lina Kat's family history includes Brain cancer  in her maternal aunt and paternal uncle; Diabetes type II in her father; Heart attack in her father; Hypertension in her brother and mother.    Medications and Allergies     Medications  Medication List with Changes/Refills   New Medications    DOXYCYCLINE (VIBRAMYCIN) 100 MG CAP    Take 1 capsule (100 mg total) by mouth 2 (two) times daily. for 7 days    FENOFIBRATE 160 MG TAB    Take 1 tablet (160 mg total) by mouth once daily.   Current Medications    NICOTINE (NICODERM CQ) 21 MG/24 HR    Place 1 patch onto the skin Daily.   Changed and/or Refilled Medications    Modified Medication Previous Medication    ALBUTEROL (PROVENTIL/VENTOLIN HFA) 90 MCG/ACTUATION INHALER albuterol (PROVENTIL/VENTOLIN HFA) 90 mcg/actuation inhaler       Inhale 2 puffs into the lungs every 6 (six) hours as needed for Wheezing or Shortness of Breath.    Inhale 2 puffs into the lungs every 6 (six) hours as needed for Wheezing or Shortness of Breath.    LISINOPRIL 10 MG TABLET lisinopriL 10 MG tablet       Take 1 tablet (10 mg total) by mouth once daily.    Take 1 tablet (10 mg total) by mouth once daily at 6am.    OXYBUTYNIN (DITROPAN) 5 MG TAB oxybutynin (DITROPAN) 5 MG Tab       Take 1 tablet (5 mg total) by mouth 2 (two) times daily.    Take 1 tablet (5 mg total) by mouth 3 (three) times daily as needed (bladder spasm).    PRAVASTATIN (PRAVACHOL) 40 MG TABLET pravastatin (PRAVACHOL) 40 MG tablet       Take 1 tablet (40 mg total) by mouth every evening.    Take 1 tablet (40 mg total) by mouth every evening.    SERTRALINE (ZOLOFT) 100 MG TABLET sertraline (ZOLOFT) 100 MG tablet       Take 2 tablets (200 mg total) by mouth once daily.    Take 2 tablets (200 mg total) by mouth once daily at 6am.   Discontinued Medications    FENOFIBRATE 120 MG TAB    Take 1 tablet by mouth once daily.         Allergies  Review of patient's allergies indicates:   Allergen Reactions    Codeine Swelling       Physical Examination     Vitals:    12/16/22 0739    BP: 115/85   Pulse: 92   Resp: 20   Temp: 97.8 °F (36.6 °C)         Physical Exam  Vitals reviewed.   Constitutional:       Appearance: Normal appearance. She is normal weight.   HENT:      Head: Normocephalic and atraumatic.      Right Ear: External ear normal.      Left Ear: External ear normal.      Nose: Nose normal.      Mouth/Throat:      Mouth: Mucous membranes are moist.      Pharynx: Oropharynx is clear.   Eyes:      Extraocular Movements: Extraocular movements intact.      Conjunctiva/sclera: Conjunctivae normal.      Pupils: Pupils are equal, round, and reactive to light.   Cardiovascular:      Rate and Rhythm: Normal rate and regular rhythm.      Pulses: Normal pulses.      Heart sounds: Normal heart sounds.   Pulmonary:      Effort: Pulmonary effort is normal.      Breath sounds: No wheezing.   Abdominal:      General: Bowel sounds are normal.      Palpations: Abdomen is soft.   Musculoskeletal:         General: Normal range of motion.      Cervical back: Normal range of motion and neck supple.   Skin:     General: Skin is warm and dry.      Capillary Refill: Capillary refill takes less than 2 seconds.          Neurological:      General: No focal deficit present.      Mental Status: She is alert and oriented to person, place, and time.   Psychiatric:         Mood and Affect: Mood normal.         Behavior: Behavior normal.         Thought Content: Thought content normal.         Judgment: Judgment normal.         Results     Lab Results   Component Value Date    WBC 7.1 12/12/2022    RBC 4.94 12/12/2022    HGB 15.2 12/12/2022    HCT 46.8 12/12/2022    MCV 94.7 (H) 12/12/2022    MCH 30.8 12/12/2022    MCHC 32.5 (L) 12/12/2022    RDW 12.9 12/12/2022     12/12/2022    MPV 11.0 (H) 12/12/2022     Lab Results   Component Value Date     12/12/2022    K 4.8 12/12/2022    CO2 24 12/12/2022    BUN 18.4 12/12/2022    CREATININE 0.92 12/12/2022    CALCIUM 9.5 12/12/2022    ALBUMIN 4.0 12/12/2022     BILITOT 0.3 12/12/2022    ALKPHOS 70 12/12/2022    AST 18 12/12/2022    ALT 32 12/12/2022    EGFRIFAFRICA 85 (L) 08/01/2021    EGFRNONAA >60 05/24/2022     Lab Results   Component Value Date    TSH 2.3244 08/17/2022     Lab Results   Component Value Date    CHOL 190 12/12/2022    HDL 42 12/12/2022    .00 12/12/2022    TRIG 187 (H) 12/12/2022     Lab Results   Component Value Date    COLORU Yellow 08/01/2021    APPEARANCEUA Clear 12/12/2022    PHUR 6.0 08/01/2021    GLUCUA Negative 08/01/2021    KETONESU Negative 08/01/2021    OCCULTUA Negative 08/01/2021    NITRITE Negative 08/01/2021    LEUKOCYTESUR Negative 12/12/2022    RBCUA 0-5 12/12/2022    WBCUA 0-5 12/12/2022    BACTERIA Trace (A) 12/12/2022    HYALINECASTS None Seen 12/12/2022     Lab Results   Component Value Date    CREATRANDUR 92.9 12/12/2022     Lab Results   Component Value Date    OGCYTVNE43MI 41.7 12/12/2022     No results found for: HIV, HEPAIGM, HEPBCOREM, HEPBSAG, HEPCAB  No results found for: FITDIAG, COLOGUARD      Assessment and Plan (including Health Maintenance)     Health Maintenance Due   Topic Date Due    Hepatitis C Screening  Never done    COVID-19 Vaccine (1) Never done    HIV Screening  Never done    Colorectal Cancer Screening  Never done       Problem List Items Addressed This Visit          Psychiatric    Recurrent major depressive disorder, in full remission    Current Assessment & Plan     Controlled.  Continue sertraline - refilled today.  Read positive daily meditations, avoid negative media, set healthy boundaries.   Exercise daily, keep consistent sleep pattern, eat a healthy diet.   Establish good social support, make changes to reduce stress.   Do not drink alcohol or use illicit drugs.   Reports any symptoms of suicidal/homicidal ideations or self harm immediately, go to nearest emergency room.           Relevant Medications    sertraline (ZOLOFT) 100 MG tablet       Pulmonary    Wheezing    Current Assessment & Plan  "    Continue albuterol - refilled today.           Relevant Medications    albuterol (PROVENTIL/VENTOLIN HFA) 90 mcg/actuation inhaler       Cardiac/Vascular    Hypertension - Primary    Current Assessment & Plan     Vitals:    12/16/22 0739   BP: 115/85   Pulse: 92   Resp: 20   Temp: 97.8 °F (36.6 °C)   TempSrc: Oral   SpO2: 100%   Weight: 116.4 kg (256 lb 9.9 oz)   Height: 5' 7" (1.702 m)     Follow a low sodium (less than 2 grams of sodium per day), DASH diet.   Continue lisinoprils as prescribed.  Monitor blood pressure and report any consistent values greater than 140/90 and keep a log.  Encouraged smoking cessation to aid in BP reduction and co-morbidities.   Maintain healthy weight with a BMI goal of <30.   Aerobic exercise for 150 minutes per week (or 5 days a week for 30 minutes each day).          Relevant Medications    lisinopriL 10 MG tablet    Other Relevant Orders    CBC Auto Differential    Comprehensive Metabolic Panel    Microalbumin/Creatinine Ratio, Urine    Hypertriglyceridemia    Current Assessment & Plan     Lab Results   Component Value Date    .00 12/12/2022       Lab Results   Component Value Date    TRIG 187 (H) 12/12/2022       Lab Results   Component Value Date    HDL 42 12/12/2022      Lab Results   Component Value Date    CHOL 190 12/12/2022    Increase fenofibrate to 160 mg.  Continue pravastatin as prescribed.   Follow a low cholesterol, low saturated fat diet with less than 200 mg of cholesterol a day.   Avoid fried foods and high saturated fats.  Add flax seed or fish oil supplements to diet.   Increase dietary fiber.   Regular exercise improves cholesterol levels.  Physical activity 5 times a week for 30 minutes per day (or 150 minutes per week).   Stressed importance of dietary modifications.           Relevant Medications    fenofibrate 160 MG Tab    pravastatin (PRAVACHOL) 40 MG tablet    Other Relevant Orders    Lipid Panel       Renal/    OAB (overactive bladder)    " Current Assessment & Plan     Change to oxybutynin 5 mg twice daily.  Timed voiding every 2-3 hours.   Avoidance of bladder irritants such as alcohol citrus foods & drinks, chocolates, caffeinated drinks, energy drinks, spicy foods, sodas.  Increase water intake.           Relevant Medications    oxybutynin (DITROPAN) 5 MG Tab    Other Relevant Orders    Urinalysis    Urine culture       ID    Immunization due    Current Assessment & Plan     After obtaining informed consent, the flu and pneumococcal vaccines was administered.  Ok to take acetaminophen for soreness of arm.          Relevant Orders    Influenza - Quadrivalent *Preferred* (6 months+) (PF) (Completed)    Pneumococcal Conjugate Vaccine (20 Valent) (IM) (Completed)    Boil    Current Assessment & Plan     Plum sized boil on left lower abdomen with erythema.  Rx doxycycline.  Apply warm compress up to 20 minutes at a time.  RTC for any fever, pus drainage, or increased pain at site.         Relevant Medications    doxycycline (VIBRAMYCIN) 100 MG Cap       Oncology    Lipoma of extremity    Current Assessment & Plan     Plum sized lipoma below right knee cap.  Denies pain.  US soft tissue extremity ordered.         Relevant Orders    US Soft Tissue, Lower Extremity, Right       Endocrine    BMI 40.0-44.9, adult    Current Assessment & Plan     Body mass index is 40.19 kg/m².  Goal BMI <30.  Aerobic exercise 150 minutes per week.  Avoid soda, simple sugars, sweets, excessive rice, pasta, potatoes or bread.   Choose brown options when available and portion control.  Limit fast foods and fried foods.   Choose complex carbs in moderation (ex: green, leafy vegetables, beans, oatmeal).  Eat plenty of fresh fruits and vegetables with lean meats daily.   Consider permanent healthy lifestyle changes.          Impaired fasting glucose    Current Assessment & Plan     Lab Results   Component Value Date    GLUCOSE 128 (H) 12/12/2022    GLUCOSE 124 (H) 05/24/2022     GLUCOSE 115 (H) 08/01/2021   Avoid soda, simple sweets, and limit rice/pasta/bread/starches and consume brown options when possible.    Maintain healthy weight with BMI goal <30.    Perform aerobic exercise for 150 minutes per week (or 5 days a week for 30 minutes each day).              Relevant Orders    Hemoglobin A1C       Other    UBALDO (obstructive sleep apnea)    Current Assessment & Plan     Reports compliance with wearing CPAP nightly. Reports decreased EDS, snoring and fatigue. Pt is benefiting from its use. Expect lifetime use and decreased daytime sleepiness/fatigue. Avoid excessive alcohol, smoking and overuse of sedatives at bedtime.          Tobacco user    Current Assessment & Plan     Smoking cessation discussed for 3 minutes.   Pt not ready to quit.  Already established with Smoking Cessation.  States nicotine patches is not working.  Contact smoking cessation department for re-evaluation.  Discussed benefits of quitting including improved health, decreased cardiac/vascular/pulmonary/stroke risks as well as saving money.           Other Visit Diagnoses       Screening for viral disease        Relevant Orders    Hepatitis Panel, Acute    Screening for HIV (human immunodeficiency virus)        Relevant Orders    HIV 1/2 Ag/Ab (4th Gen)              Health Maintenance Topics with due status: Not Due       Topic Last Completion Date    TETANUS VACCINE 07/22/2019    Cervical Cancer Screening 02/25/2021    Hemoglobin A1c (Prediabetes) 05/24/2022    Mammogram 08/17/2022    Lipid Panel 12/12/2022       Future Appointments   Date Time Provider Department Center   12/20/2022  8:00 AM BS US1 [facility]   3/28/2023  1:10 PM Faina Weinstein, JAYCEE [facility] G[facility] [place] [facility]   4/17/2023  7:30 AM RAMIRO Rodgers [facility] [place] [facility]        RTC in 4 months and prn.  Labs within a week of visit.     Signature:        RAMIRO Rodgers  [facility]  [address] [facility] [id number]    Date of encounter: 12/16/22

## 2022-12-16 NOTE — ASSESSMENT & PLAN NOTE
>>ASSESSMENT AND PLAN FOR RECURRENT MAJOR DEPRESSIVE DISORDER, IN FULL REMISSION WRITTEN ON 12/16/2022  9:17 AM BY MARGARITA SOSA FNP    Controlled.  Continue sertraline - refilled today.  Read positive daily meditations, avoid negative media, set healthy boundaries.   Exercise daily, keep consistent sleep pattern, eat a healthy diet.   Establish good social support, make changes to reduce stress.   Do not drink alcohol or use illicit drugs.   Reports any symptoms of suicidal/homicidal ideations or self harm immediately, go to nearest emergency room.

## 2022-12-16 NOTE — ASSESSMENT & PLAN NOTE
Lab Results   Component Value Date    GLUCOSE 128 (H) 12/12/2022    GLUCOSE 124 (H) 05/24/2022    GLUCOSE 115 (H) 08/01/2021     Avoid soda, simple sweets, and limit rice/pasta/bread/starches and consume brown options when possible.    Maintain healthy weight with BMI goal <30.    Perform aerobic exercise for 150 minutes per week (or 5 days a week for 30 minutes each day).

## 2022-12-16 NOTE — ASSESSMENT & PLAN NOTE
Smoking cessation discussed for 3 minutes.   Pt not ready to quit.  Already established with Smoking Cessation.  States nicotine patches is not working.  Contact smoking cessation department for re-evaluation.  Discussed benefits of quitting including improved health, decreased cardiac/vascular/pulmonary/stroke risks as well as saving money.

## 2022-12-20 ENCOUNTER — PATIENT MESSAGE (OUTPATIENT)
Dept: INTERNAL MEDICINE | Facility: CLINIC | Age: 47
End: 2022-12-20
Payer: MEDICAID

## 2022-12-20 DIAGNOSIS — D17.20 LIPOMA OF EXTREMITY: Primary | ICD-10-CM

## 2022-12-22 ENCOUNTER — PATIENT MESSAGE (OUTPATIENT)
Dept: INTERNAL MEDICINE | Facility: CLINIC | Age: 47
End: 2022-12-22
Payer: MEDICAID

## 2022-12-27 ENCOUNTER — TELEPHONE (OUTPATIENT)
Dept: INTERNAL MEDICINE | Facility: CLINIC | Age: 47
End: 2022-12-27

## 2023-01-03 ENCOUNTER — OFFICE VISIT (OUTPATIENT)
Dept: SURGERY | Facility: CLINIC | Age: 48
End: 2023-01-03
Payer: MEDICAID

## 2023-01-03 VITALS
OXYGEN SATURATION: 97 % | WEIGHT: 254.63 LBS | SYSTOLIC BLOOD PRESSURE: 118 MMHG | HEART RATE: 81 BPM | DIASTOLIC BLOOD PRESSURE: 87 MMHG | TEMPERATURE: 98 F | BODY MASS INDEX: 39.97 KG/M2 | HEIGHT: 67 IN

## 2023-01-03 DIAGNOSIS — D17.20 LIPOMA OF EXTREMITY: Primary | ICD-10-CM

## 2023-01-03 PROCEDURE — 99215 OFFICE O/P EST HI 40 MIN: CPT | Mod: PBBFAC

## 2023-01-03 NOTE — PROGRESS NOTES
Pt in Surgery Clinic today for referral for Lipoma to [facility].  Evaluated per Dr. MANA Alvarez and Dr. RACHAEL Perez.  Pt will RTC in 1 month w/MRI.

## 2023-01-03 NOTE — PROGRESS NOTES
[facility] General Surgery Clinic Note    HPI: Lina Kat is a 47 y.o. with PMH/PSH of HTN, HLD, Anxiety, Depression, UBALDO with nightly CPAP, tobacco abuse wuth a 15 pack year history (reports quit 4 days ago, she denies using nicotine path), laparoscopic cholecystectomy in 2008, and a tubal ligation. Reports no problems with anesthesia. She states she is palling to have a gastric sleeve in February or March, she is pending lab work to be scheduled. She presents to clinic with a right prepatellar mass, appearing like a lipoma which she would like removed due to discomfort. She denies having lipomas elsewhere in her body.    PMH:   Past Medical History:   Diagnosis Date    Anxiety     Depression     Hyperlipidemia     Hypertension     UBALDO (obstructive sleep apnea)       Meds:   Current Outpatient Medications:     albuterol (PROVENTIL/VENTOLIN HFA) 90 mcg/actuation inhaler, Inhale 2 puffs into the lungs every 6 (six) hours as needed for Wheezing or Shortness of Breath., Disp: 18 g, Rfl: 1    fenofibrate 160 MG Tab, Take 1 tablet (160 mg total) by mouth once daily., Disp: 90 tablet, Rfl: 1    lisinopriL 10 MG tablet, Take 1 tablet (10 mg total) by mouth once daily., Disp: 90 tablet, Rfl: 1    oxybutynin (DITROPAN) 5 MG Tab, Take 1 tablet (5 mg total) by mouth 2 (two) times daily., Disp: 180 tablet, Rfl: 1    pravastatin (PRAVACHOL) 40 MG tablet, Take 1 tablet (40 mg total) by mouth every evening., Disp: 90 tablet, Rfl: 1    sertraline (ZOLOFT) 100 MG tablet, Take 2 tablets (200 mg total) by mouth once daily., Disp: 180 tablet, Rfl: 1    nicotine (NICODERM CQ) 21 mg/24 hr, Place 1 patch onto the skin Daily., Disp: , Rfl:   Allergies:   Review of patient's allergies indicates:   Allergen Reactions    Codeine Swelling     Social History:   Social History     Tobacco Use    Smoking status: Former     Packs/day: 1.00     Years: 15.00     Pack years: 15.00     Types: Cigarettes    Smokeless tobacco: Never    Tobacco comments:      Pt quit 4 days ago.   Substance Use Topics    Alcohol use: Never    Drug use: Never     Family History:   Family History   Problem Relation Age of Onset    Hypertension Mother     Heart attack Father     Diabetes type II Father     Hypertension Brother     Brain cancer Maternal Aunt     Brain cancer Paternal Uncle      Surgical History:   Past Surgical History:   Procedure Laterality Date    CHOLECYSTECTOMY      TUBAL LIGATION       Review of Systems:  10 point review of systems denied unless otherwise indicated above HPI    Objective:    Vitals:  Vitals:    01/03/23 1019   BP: 118/87   Pulse: 81   Temp: 98.1 °F (36.7 °C)          Physical Exam:  Gen: NAD, obese  Neuro: awake, alert, answering questions appropriately  CV: RRR  Resp: non-labored breathing  Abd: soft, ND, NT  Ext: moves all 4 spontaneously and purposefully, right medial knee prepatellar   Skin: warm, well perfused    Pertinent Labs:  Most recent labs reviewed    Imaging:  US right knee: No discrete mass. Prominent normal appearing prepatellar subcutaneous fat and a non encapsulated lipoma is difficult to exclude.    Micro/Path/Other:  None.    Assessment/Plan:  47 year old female with right medial prepatellar mass, appearing like a lipoma which she would like removed due to discomfort. She denies having lipomas elsewhere in her body. She has PMH/PSH of HTN, HLD, Anxiety, Depression, UBALDO with nightly CPAP, tobacco abuse wuth a 15 pack year history (reports quit 4 days ago, she denies using nicotine path), laparoscopic cholecystectomy in 2008, and a tubal ligation.    - Will order MRI w/ w/o contrast of right knee to determine involvement of joint.  - Pt to return to clinic in 1 month to schedule to excision of right knee mass/lipoma.    Millicent Alvarez M.D. PGY-1  01/03/2023 11:09 AM

## 2023-01-03 NOTE — PROGRESS NOTES
I have reviewed the notes, assessments, and/or procedures performed by the resident, I concur with her/his documentation of Lina Kat.     Madhuri Carmen MD

## 2023-01-05 ENCOUNTER — DOCUMENTATION ONLY (OUTPATIENT)
Dept: SURGERY | Facility: HOSPITAL | Age: 48
End: 2023-01-05
Payer: MEDICAID

## 2023-01-05 DIAGNOSIS — R22.41 KNEE MASS, RIGHT: Primary | ICD-10-CM

## 2023-01-06 ENCOUNTER — OFFICE VISIT (OUTPATIENT)
Dept: INTERNAL MEDICINE | Facility: CLINIC | Age: 48
End: 2023-01-06
Payer: MEDICAID

## 2023-01-06 ENCOUNTER — HOSPITAL ENCOUNTER (OUTPATIENT)
Dept: RADIOLOGY | Facility: HOSPITAL | Age: 48
Discharge: HOME | End: 2023-01-06
Payer: MEDICAID

## 2023-01-06 VITALS
SYSTOLIC BLOOD PRESSURE: 120 MMHG | DIASTOLIC BLOOD PRESSURE: 85 MMHG | RESPIRATION RATE: 20 BRPM | HEIGHT: 67 IN | BODY MASS INDEX: 40.18 KG/M2 | OXYGEN SATURATION: 97 % | WEIGHT: 256 LBS | TEMPERATURE: 98 F | HEART RATE: 79 BPM

## 2023-01-06 DIAGNOSIS — R22.41 KNEE MASS, RIGHT: ICD-10-CM

## 2023-01-06 DIAGNOSIS — I10 HYPERTENSION, UNSPECIFIED TYPE: Primary | ICD-10-CM

## 2023-01-06 DIAGNOSIS — E78.1 HYPERTRIGLYCERIDEMIA: ICD-10-CM

## 2023-01-06 DIAGNOSIS — G47.33 OSA (OBSTRUCTIVE SLEEP APNEA): ICD-10-CM

## 2023-01-06 PROCEDURE — 73560 X-RAY EXAM OF KNEE 1 OR 2: CPT | Mod: TC,RT

## 2023-01-06 PROCEDURE — 99213 OFFICE O/P EST LOW 20 MIN: CPT | Mod: PBBFAC

## 2023-01-06 NOTE — PROGRESS NOTES
[facility] Resident Clinic    Subjective:       PMH/PSH of HTN, HLD, Anxiety, Depression, UBALDO with nightly CPAP, tobacco abuse wuth a 15 pack year history (reports quit 4 days ago, she denies using nicotine path), laparoscopic cholecystectomy in 2008, and a tubal ligation. Patient was cleared in October however surgery and the note has to be within 30 days, therefore she is here for revaluation. Patient is to have a gastric sleeve, patient has had 3 surgeries in the past and denies any reactions to anesthesia. Patient has quit smoking since the 30th. Can walk 2 flights of stairs, and can do ADL's. Patient denies any chest pain or dyspnea.     Review of Systems:  The remainder of the 14 system ROS is noncontributory or negative unless mentioned/reviewed above.    Objective:   Vital Signs:  Vitals:    01/06/23 1332   BP: 120/85   Pulse: 79   Resp: 20   Temp: 98.3 °F (36.8 °C)        Body mass index is 40.1 kg/m².     General:  Well developed, well nourished, no acute respiratory distress  Head: Normocephalic, atraumatic  Eyes: PERRL, anicteric sclera  Throat: No posterior pharyngeal erythema or exudate, no tonsillar exudate  CVS:  RRR, S1 and S2 normal, no murmurs, no added heart sounds, rubs, gallops, regular peripheral pulses, and no peripheral edema  Resp:  Lungs clear to auscultation bilaterally, no wheezes, rales, or rhonci  GI:  Abdomen soft, non-tender  MSK:  Full range of motion, no obvious deformities   Skin:  No rashes, ulcers, erythema  Neuro:  Alert and oriented x3, No focal neuro deficits, CNII-XII grossly intact  Psych:  Appropriate mood and affect         Laboratory:  Lab Results   Component Value Date    WBC 7.1 12/12/2022    HGB 15.2 12/12/2022    HCT 46.8 12/12/2022     12/12/2022    MCV 94.7 (H) 12/12/2022    RDW 12.9 12/12/2022     Lab Results   Component Value Date    HGBA1C 5.6 05/24/2022    .0 05/24/2022    CREATININE 0.92 12/12/2022    CREATRANDUR 92.9 12/12/2022    Lab Results    Component Value Date     12/12/2022    K 4.8 12/12/2022    CHLORIDE 110 (H) 12/12/2022    CO2 24 12/12/2022    BUN 18.4 12/12/2022    CREATININE 0.92 12/12/2022    CALCIUM 9.5 12/12/2022     Lab Results   Component Value Date    TSH 2.3244 08/17/2022    [id number] 0.86 08/17/2022            Current Medications:  Current Outpatient Medications   Medication Instructions    albuterol (PROVENTIL/VENTOLIN HFA) 90 mcg/actuation inhaler 2 puffs, Inhalation, Every 6 hours PRN    fenofibrate 160 mg, Oral, Daily    lisinopriL 10 mg, Oral, Daily    nicotine (NICODERM CQ) 21 mg/24 hr 1 patch, Transdermal, Daily    oxybutynin (DITROPAN) 5 mg, Oral, 2 times daily    pravastatin (PRAVACHOL) 40 mg, Oral, Nightly    sertraline (ZOLOFT) 200 mg, Oral, Daily        Assessment and Plan:        Health Maintenance Due   Topic Date Due    Hepatitis C Screening  Never done    HIV Screening  Never done    Colorectal Cancer Screening  Never done          HTN:  Medically stable   Continue medications as above     HLD:  Medically stable continue medications     UBALDO:  Medically stable on CPAP    Smoking:   Had PFT's she has no records but was told she was never diagnosed with COPD  She has quit  Medically stable    RCRI 1 Patient is medically stable for surgery       Lela Mike MD

## 2023-01-06 NOTE — PROGRESS NOTES
I have reviewed and concur with the resident's history, physical, assessment, and plan.  I have discussed with him all issues related to the diagnosis, workup and treatment plan. Care provided as reasonable and necessary.  Medically stable for surgery  Anton Key MD  [facility]

## 2023-01-06 NOTE — PROGRESS NOTES
"Documentation in related  to inquiry (copied below) from insurance necessitating a peer to peer.    "Dr. Millicent Alvarez,     Thank you for ordering [id number] - MRI KNEE W WO CONTRAST RIGHT for patient Lina Kat, MRN [id number]. Unfortunately, the patient's insurance, [facility] [employer] ([place]), has denied the service due to Does Not Meet 3rd Party Guidelines, N/A. This is an automated In Basket notification that does not require a reply. For a more detailed explanation, or for questions regarding this insurance denial, send an In Basket message to the Pre Service Intake pool or call the [facility] department at [phone] and reference referral ID [id number].The Pre-Service department hours are M-F 8 a.m. to 5 p.m.       Thank you,     [facility]"    Peer to Peer with Dr. Martínez on 1/5/2023 at 12:30 for case #[id number].    In summary I discussed the need for MRI to evaluate if mass was also involving joint as the ultrasound could not definitively answer this question. Dr. Martínez stated there would be a need for a plain film before they could approve an MRI. I stated that there was no clinical need for an XR but if I needed to order It in order for the pt to get the MRI the I would place there order. Dr. Martínez agreed to not deny the request for the MRI since I was putting in the order for the Xray immediately.     I called the pt at 6 pm on 1/5 to inform her of the new order for the XR and she stated she will come to the hospital tomorrow to get it done.     "

## 2023-01-06 NOTE — PROGRESS NOTES
I have reviewed and concur with the resident's history, physical, assessment, and plan.  I have discussed with him all issues related to the diagnosis, workup and treatment plan. Care provided as reasonable and necessary.    Anton Key MD  [facility]

## 2023-01-31 ENCOUNTER — DOCUMENTATION ONLY (OUTPATIENT)
Dept: SURGERY | Facility: CLINIC | Age: 48
End: 2023-01-31

## 2023-01-31 ENCOUNTER — OFFICE VISIT (OUTPATIENT)
Dept: SURGERY | Facility: CLINIC | Age: 48
End: 2023-01-31
Payer: MEDICAID

## 2023-01-31 VITALS
WEIGHT: 259.81 LBS | SYSTOLIC BLOOD PRESSURE: 104 MMHG | RESPIRATION RATE: 18 BRPM | HEART RATE: 98 BPM | BODY MASS INDEX: 40.78 KG/M2 | HEIGHT: 67 IN | DIASTOLIC BLOOD PRESSURE: 73 MMHG | OXYGEN SATURATION: 97 %

## 2023-01-31 DIAGNOSIS — D17.23 LIPOMA OF RIGHT LOWER EXTREMITY: Primary | ICD-10-CM

## 2023-01-31 PROCEDURE — 99214 OFFICE O/P EST MOD 30 MIN: CPT | Mod: PBBFAC

## 2023-01-31 NOTE — PROGRESS NOTES
"Formal Written Appeal for MRI    [facility]  [address] [address]  [place] [address]  Phone# [phone]  Fax [phone]  Urgent Fax [phone]    - Authorization # [id number]  - Explanation of dialogue with Insurance company:   Documentation in related  to inquiry (copied below) from insurance necessitating a peer to peer.     "Dr. Millicent Alvarez,     Thank you for ordering [id number] - MRI KNEE W WO CONTRAST RIGHT for patient Lina Kat, MRN [id number]. Unfortunately, the patient's insurance, [facility] [employer] ([place]), has denied the service due to Does Not Meet 3rd Party Guidelines, N/A. This is an automated In Basket notification that does not require a reply. For a more detailed explanation, or for questions regarding this insurance denial, send an In Basket message to the Pre Service Intake pool or call the [facility] department at [phone] and reference referral ID [id number].The Pre-Service department hours are M-F 8 a.m. to 5 p.m.       Thank you,     [facility]"     Peer to Peer with Dr. Martínez on 1/5/2023 at 12:30 for case #[id number].     In summary I discussed the need for MRI to evaluate if mass was also involving joint as the ultrasound could not definitively answer this question. Dr. Martínez stated there would be a need for a plain film before they could approve an MRI. I stated that there was no clinical need for an XR but if I needed to order It in order for the pt to get the MRI the I would place there order. Dr. Martínez agreed to not deny the request for the MRI since I was putting in the order for the Xray immediately.      I called the pt at 6 pm on 1/5 to inform her of the new order for the XR and she stated she will come to the hospital tomorrow to get it done.    Xray of Right Knee Performed on 1/6/23: impression:No acute osseous abnormality.    As discussed before Xray was unable to answer any clinical question " about soft tissue involvement of the knee mass, MRI is needed.    - NPI# [id number]  - Tax ID# [id number]  - Pt member ID# [id number]  - Pt name: Lina Kat  - Pt : 1975    - Clinic note:   [facility] General Surgery Clinic Note     HPI: Lina Kat is a 47 y.o. with PMH/PSH of HTN, HLD, Anxiety, Depression, UBALDO with nightly CPAP, tobacco abuse wuth a 15 pack year history (reports quit 4 days ago, she denies using nicotine path), laparoscopic cholecystectomy in , and a tubal ligation. Reports no problems with anesthesia. She states she is palling to have a gastric sleeve in February or March, she is pending lab work to be scheduled. She presents to clinic with a right prepatellar mass, appearing like a lipoma which she would like removed due to discomfort. She denies having lipomas elsewhere in her body.     PMH:        Past Medical History:   Diagnosis Date    Anxiety      Depression      Hyperlipidemia      Hypertension      UBALDO (obstructive sleep apnea)        Meds:   Current Outpatient Medications:     albuterol (PROVENTIL/VENTOLIN HFA) 90 mcg/actuation inhaler, Inhale 2 puffs into the lungs every 6 (six) hours as needed for Wheezing or Shortness of Breath., Disp: 18 g, Rfl: 1    fenofibrate 160 MG Tab, Take 1 tablet (160 mg total) by mouth once daily., Disp: 90 tablet, Rfl: 1    lisinopriL 10 MG tablet, Take 1 tablet (10 mg total) by mouth once daily., Disp: 90 tablet, Rfl: 1    oxybutynin (DITROPAN) 5 MG Tab, Take 1 tablet (5 mg total) by mouth 2 (two) times daily., Disp: 180 tablet, Rfl: 1    pravastatin (PRAVACHOL) 40 MG tablet, Take 1 tablet (40 mg total) by mouth every evening., Disp: 90 tablet, Rfl: 1    sertraline (ZOLOFT) 100 MG tablet, Take 2 tablets (200 mg total) by mouth once daily., Disp: 180 tablet, Rfl: 1    nicotine (NICODERM CQ) 21 mg/24 hr, Place 1 patch onto the skin Daily., Disp: , Rfl:   Allergies:        Review of patient's allergies indicates:   Allergen Reactions    Codeine Swelling       Social History:   Social History            Tobacco Use    Smoking status: Former       Packs/day: 1.00       Years: 15.00       Pack years: 15.00       Types: Cigarettes    Smokeless tobacco: Never    Tobacco comments:       Pt quit 4 days ago.   Substance Use Topics    Alcohol use: Never    Drug use: Never      Family History:         Family History   Problem Relation Age of Onset    Hypertension Mother      Heart attack Father      Diabetes type II Father      Hypertension Brother      Brain cancer Maternal Aunt      Brain cancer Paternal Uncle        Surgical History:         Past Surgical History:   Procedure Laterality Date    CHOLECYSTECTOMY        TUBAL LIGATION          Review of Systems:  10 point review of systems denied unless otherwise indicated above HPI     Objective:     Vitals:      Vitals:     01/03/23 1019   BP: 118/87   Pulse: 81   Temp: 98.1 °F (36.7 °C)            Physical Exam:  Gen: NAD, obese  Neuro: awake, alert, answering questions appropriately  CV: RRR  Resp: non-labored breathing  Abd: soft, ND, NT  Ext: moves all 4 spontaneously and purposefully, right medial knee prepatellar   Skin: warm, well perfused     Pertinent Labs:  Most recent labs reviewed     Imaging:  US right knee: No discrete mass. Prominent normal appearing prepatellar subcutaneous fat and a non encapsulated lipoma is difficult to exclude.     Micro/Path/Other:  None.     Assessment/Plan:  47 year old female with right medial prepatellar mass, appearing like a lipoma which she would like removed due to discomfort. She denies having lipomas elsewhere in her body. She has PMH/PSH of HTN, HLD, Anxiety, Depression, UBALDO with nightly CPAP, tobacco abuse wuth a 15 pack year history (reports quit 4 days ago, she denies using nicotine path), laparoscopic cholecystectomy in 2008, and a tubal ligation.     - Will order MRI w/ w/o contrast of right knee to determine involvement of joint.  - Pt to return to clinic in 1 month to  schedule to excision of right knee mass/lipoma.     Millicent Alvarez M.D. PGY-1  01/03/2023 11:09 AM

## 2023-01-31 NOTE — PROGRESS NOTES
[facility] General Surgery Clinic Note    CC: 1mo f/u, R knee mass  HPI: Lina Kat is a 48 y.o.female with a R knee mass  She was seen in surg clinic earlier this month and an MRI of the R knee was ordered to determine joint involvement prior to scheduling surgery  MRI has yet to be completed due to insurance issues  [id number] completed  Gastric sleeve planned for March 1 with other surgeon    Past Medical History:   Diagnosis Date    Anxiety     Depression     Hyperlipidemia     Hypertension     UBALDO (obstructive sleep apnea)      Current Outpatient Medications   Medication Instructions    albuterol (PROVENTIL/VENTOLIN HFA) 90 mcg/actuation inhaler 2 puffs, Inhalation, Every 6 hours PRN    fenofibrate 160 mg, Oral, Daily    lisinopriL 10 mg, Oral, Daily    nicotine (NICODERM CQ) 21 mg/24 hr 1 patch, Transdermal, Daily    oxybutynin (DITROPAN) 5 mg, Oral, 2 times daily    pravastatin (PRAVACHOL) 40 mg, Oral, Nightly    sertraline (ZOLOFT) 200 mg, Oral, Daily     Social History     Tobacco Use    Smoking status: Former     Packs/day: 1.00     Years: 15.00     Pack years: 15.00     Types: Cigarettes    Smokeless tobacco: Never    Tobacco comments:     Pt quit 4 days ago.   Substance Use Topics    Alcohol use: Never    Drug use: Never     Past Surgical History:   Procedure Laterality Date    CHOLECYSTECTOMY      TUBAL LIGATION       Family History   Problem Relation Age of Onset    Hypertension Mother     Heart attack Father     Diabetes type II Father     Hypertension Brother     Brain cancer Maternal Aunt     Brain cancer Paternal Uncle      Review of patient's allergies indicates:   Allergen Reactions    Codeine Swelling       ROS: see HPI; otherwise, ROS negative.     Vitals  Vitals:    01/31/23 0927   BP: 104/73   Pulse: 98   Resp: 18     Physical Exam  Gen: NAD  Ext: soft, nontender, nonerythematous mass in medial R knee    Pertinent Imaging  X-ray R knee 1/6/23  My impression: no fractures/dislocations, bones  intact    All other results reviewed.    Assessment/Plan:  Lina Kat is a 48 y.o.female with soft, nontender, nonerythematous subcutaneous R knee mass. Consistent with lipoma. MRI needed to determine bone involvement before surgery  - MRI ordered and [id number] completed  - MRI needed prior to surgery  - RTC following R knee MRI  - Excision surgery will be scheduled following gastric sleeve March 1    Ralph Montaño MS3  [employer] [facility] [employer]  01/31/2023 9:23 AM     Patient seen and examined with above student.   Needs MRI   Pending insurance authorization    Obdulio Perez MD  \[facility] [place] [facility]\"" General Surgery PGY5

## 2023-01-31 NOTE — PROGRESS NOTES
Patient has a right knee mass consistent with a lipoma. Given its proximity to the knee joint an MRI will be required for surgical planning. The patient has an upcoming gastric sleeve scheduled for March 1st 2023. This MRI is not emergent but required prior to excision of knee lipoma. A knee Xray was performed for insurance purposes but this will of course not see a lipoma.    Obdulio Perez MD  [facility] General Surgery PGY5

## 2023-01-31 NOTE — PROGRESS NOTES
Pt seen by Dr. Perez. Pt will return to clinic after MRI. Awaiting documentation from Dr. Alvarez to submit to insurance. Pt declined summary at visit.

## 2023-02-08 ENCOUNTER — PATIENT MESSAGE (OUTPATIENT)
Dept: ADMINISTRATIVE | Facility: HOSPITAL | Age: 48
End: 2023-02-08
Payer: MEDICAID

## 2023-02-23 ENCOUNTER — OFFICE VISIT (OUTPATIENT)
Dept: SURGERY | Facility: CLINIC | Age: 48
End: 2023-02-23
Payer: MEDICAID

## 2023-02-23 VITALS
WEIGHT: 255.19 LBS | HEART RATE: 78 BPM | SYSTOLIC BLOOD PRESSURE: 120 MMHG | BODY MASS INDEX: 40.05 KG/M2 | RESPIRATION RATE: 18 BRPM | OXYGEN SATURATION: 96 % | TEMPERATURE: 98 F | HEIGHT: 67 IN | DIASTOLIC BLOOD PRESSURE: 83 MMHG

## 2023-02-23 DIAGNOSIS — D17.9 LIPOMA, UNSPECIFIED SITE: Primary | ICD-10-CM

## 2023-02-23 PROCEDURE — 99214 OFFICE O/P EST MOD 30 MIN: CPT | Mod: PBBFAC

## 2023-02-23 NOTE — PROGRESS NOTES
[facility] GENERAL SURGERY   Clinic Note     CC: 3 wk f/u with MRI results of right knee mass    HPI:   Lina Kat is a 48 y.o. visit the clinic for f/u for a right knee mass. Pt reports the mass is nontender and only bothers her when she walks excessively. Completed MRI, indicating subcutaneous fat at the anteromedial knee, related to body habitus or unencapsulated subcutaneous lipoma. She is scheduled for gastric sleeve on March 1 with another surgeon at [place].    Review of Systems:  10 point review of systems denied unless otherwise indicated above HPI    Allergies:   Review of patient's allergies indicates:   Allergen Reactions    Codeine Swelling       Meds:     Current Outpatient Medications:     albuterol (PROVENTIL/VENTOLIN HFA) 90 mcg/actuation inhaler, Inhale 2 puffs into the lungs every 6 (six) hours as needed for Wheezing or Shortness of Breath., Disp: 18 g, Rfl: 1    fenofibrate 160 MG Tab, Take 1 tablet (160 mg total) by mouth once daily., Disp: 90 tablet, Rfl: 1    lisinopriL 10 MG tablet, Take 1 tablet (10 mg total) by mouth once daily., Disp: 90 tablet, Rfl: 1    nicotine (NICODERM CQ) 21 mg/24 hr, Place 1 patch onto the skin Daily., Disp: , Rfl:     oxybutynin (DITROPAN) 5 MG Tab, Take 1 tablet (5 mg total) by mouth 2 (two) times daily., Disp: 180 tablet, Rfl: 1    pravastatin (PRAVACHOL) 40 MG tablet, Take 1 tablet (40 mg total) by mouth every evening., Disp: 90 tablet, Rfl: 1    sertraline (ZOLOFT) 100 MG tablet, Take 2 tablets (200 mg total) by mouth once daily., Disp: 180 tablet, Rfl: 1    PMH:   Past Medical History:   Diagnosis Date    Anxiety     Depression     Hyperlipidemia     Hypertension     UBALDO (obstructive sleep apnea)         Social History:   Social History     Tobacco Use    Smoking status: Former     Packs/day: 1.00     Years: 15.00     Pack years: 15.00     Types: Cigarettes    Smokeless tobacco: Never    Tobacco comments:     Pt quit 4 days ago.   Substance Use Topics     Alcohol use: Never    Drug use: Never       Family History:   Family History   Problem Relation Age of Onset    Hypertension Mother     Heart attack Father     Diabetes type II Father     Hypertension Brother     Brain cancer Maternal Aunt     Brain cancer Paternal Uncle        Surgical History:   Past Surgical History:   Procedure Laterality Date    CHOLECYSTECTOMY      TUBAL LIGATION         Objective:  Vitals:  There were no vitals filed for this visit.     Physical Exam:  Gen: NAD  Neuro: awake, alert, answering questions appropriately  CV: RRR  Resp: non-labored breathing  Abd: soft, ND, NT  Ext: moves all 4 spontaneously and purposefully, soft, nontender, nonerythematous mass in medial R knee measured at 6 cm x 5 cm  Skin: warm, well perfused    Pertinent Labs:  None    Imaging:  MRI Right Knee 02/13/23:  Impression:     Diffuse prominence of subcutaneous fat at the anteromedial knee centered at the level of the femorotibial joint line.  This may be related to body habitus or unencapsulated subcutaneous lipoma.  No focal mass or acute pathology at the right knee.  Mild red marrow reconversion.  This is most frequently seen with obesity, smoking, and/or anemia    Xray Knee 01/06/23:  FINDINGS:  No fracture. No dislocation.  Regional soft tissues are normal.     Impression:  No acute osseous abnormality.    Micro/Path/Other:  None    Assessment/Plan:  Lina Kat is a 48 y.o.female with soft, nontender, nonerythematous subcutaneous R knee mass. Consistent with lipoma. MRI indicates subcutaneous fat at the anteromedial knee, related to body habitus or unencapsulated subcutaneous lipoma.    - Excision surgery will be scheduled following gastric sleeve March 1  - [facility] in 1 month for pre-operative appointment for lipoma removal    Rhys Narayanan MS3  02/23/2023 11:22 AM       RESIDENT ATTESTATION    I have seen and  evaluated the patient with the student doctor. Agree with assessment and plan with following  changes:    Assessment/Plan:  Lina Kat is a 48 y.o. female with anterior right knee lipoma s/p MRI 2/13. Soft tissue mass is moderately bothersome with prolonged exercise. Patient is scheduled for gastric sleeve in [place] 3/1. Will return to clinic in 1 month to establish surgical planning.     Kyle Robertson MD  [facility] General Surgery PGY-1  11:54 AM

## 2023-02-23 NOTE — PROGRESS NOTES
Patient seen by Dr. Trivedi and Dr. Robertson. Patient is scheduled to have bariatric surgery on 3/1/23. Will RTC 1 month to assess after her bariatric surgery. Discharge instructions given verbal and written.

## 2023-03-23 ENCOUNTER — PATIENT MESSAGE (OUTPATIENT)
Dept: INTERNAL MEDICINE | Facility: CLINIC | Age: 48
End: 2023-03-23
Payer: MEDICAID

## 2023-03-23 ENCOUNTER — OFFICE VISIT (OUTPATIENT)
Dept: SURGERY | Facility: CLINIC | Age: 48
End: 2023-03-23
Payer: MEDICAID

## 2023-03-23 VITALS
HEART RATE: 87 BPM | WEIGHT: 238.63 LBS | RESPIRATION RATE: 20 BRPM | BODY MASS INDEX: 37.45 KG/M2 | DIASTOLIC BLOOD PRESSURE: 79 MMHG | OXYGEN SATURATION: 96 % | SYSTOLIC BLOOD PRESSURE: 112 MMHG | TEMPERATURE: 98 F | HEIGHT: 67 IN

## 2023-03-23 DIAGNOSIS — Z01.818 PRE-OP TESTING: ICD-10-CM

## 2023-03-23 DIAGNOSIS — D17.9 LIPOMA, UNSPECIFIED SITE: Primary | ICD-10-CM

## 2023-03-23 DIAGNOSIS — D17.9 LIPOMA: ICD-10-CM

## 2023-03-23 PROCEDURE — 99215 OFFICE O/P EST HI 40 MIN: CPT | Mod: PBBFAC

## 2023-03-23 RX ORDER — SODIUM CHLORIDE 9 MG/ML
INJECTION, SOLUTION INTRAVENOUS CONTINUOUS
Status: CANCELLED | OUTPATIENT
Start: 2023-03-23

## 2023-03-23 RX ORDER — HEPARIN SODIUM 5000 [USP'U]/ML
5000 INJECTION, SOLUTION INTRAVENOUS; SUBCUTANEOUS
Status: CANCELLED | OUTPATIENT
Start: 2023-03-23 | End: 2023-03-24

## 2023-03-23 NOTE — PROGRESS NOTES
Seen by Dr. Nascimento.  Surgery 3/29/23.  Surgery consent signed and witnessed.  Surgery instruction sheet given.  Post op appt scheduled. Discharge instructions given.

## 2023-03-23 NOTE — H&P (VIEW-ONLY)
[facility]  Clinic Visit     SUBJECTIVE:  Lina Kat is a 48 y.o. visit the clinic for f/u for a right knee mass. Pt reports the mass is nontender and only bothers her when she walks excessively. Completed MRI, indicating subcutaneous fat at the anteromedial knee, related to body habitus or unencapsulated subcutaneous lipoma. She is scheduled for gastric sleeve on March 1 with another surgeon at [place].    OBJECTIVE:  Vitals:    03/23/23 1042   BP: 112/79   Pulse: 87   Resp: 20   Temp: 97.9 °F (36.6 °C)       Gen: NAD  Neuro: awake, alert, answering questions appropriately  CV: RRR  Resp: non-labored breathing  Abd: soft, ND, NT  Ext: moves all 4 spontaneously and purposefully, soft, nontender, nonerythematous mass in medial R knee measured at 6 cm x 5 cm  Skin: warm, well perfused      Imaging:  MRI Right Knee 02/13/23:  Impression:     Diffuse prominence of subcutaneous fat at the anteromedial knee centered at the level of the femorotibial joint line.  This may be related to body habitus or unencapsulated subcutaneous lipoma.  No focal mass or acute pathology at the right knee.  Mild red marrow reconversion.  This is most frequently seen with obesity, smoking, and/or anemia     Xray Knee 01/06/23:  FINDINGS:  No fracture. No dislocation.  Regional soft tissues are normal.     Impression:  No acute osseous abnormality.     Micro/Path/Other:  None     Assessment/Plan:  Lina Kat is a 48 y.o.female with soft, nontender, nonerythematous subcutaneous R knee mass. Consistent with lipoma. MRI indicates subcutaneous fat at the anteromedial knee, related to body habitus or unencapsulated subcutaneous lipoma. Patient with persistent pain during ambulation. She had recent Gastric sleeve on March 1st and is now ready for excision of mass so that she can exercise and continue to loose weight.     -Will book for excision of right knee soft tissue mass on 3/29      Kendall Nascimento MD   PGY-1   [facility]  Surgery

## 2023-03-23 NOTE — PROGRESS NOTES
[facility]  Clinic Visit     SUBJECTIVE:  Lina Kat is a 48 y.o. visit the clinic for f/u for a right knee mass. Pt reports the mass is nontender and only bothers her when she walks excessively. Completed MRI, indicating subcutaneous fat at the anteromedial knee, related to body habitus or unencapsulated subcutaneous lipoma. She is scheduled for gastric sleeve on March 1 with another surgeon at [place].    OBJECTIVE:  Vitals:    03/23/23 1042   BP: 112/79   Pulse: 87   Resp: 20   Temp: 97.9 °F (36.6 °C)       Gen: NAD  Neuro: awake, alert, answering questions appropriately  CV: RRR  Resp: non-labored breathing  Abd: soft, ND, NT  Ext: moves all 4 spontaneously and purposefully, soft, nontender, nonerythematous mass in medial R knee measured at 6 cm x 5 cm  Skin: warm, well perfused      Imaging:  MRI Right Knee 02/13/23:  Impression:     Diffuse prominence of subcutaneous fat at the anteromedial knee centered at the level of the femorotibial joint line.  This may be related to body habitus or unencapsulated subcutaneous lipoma.  No focal mass or acute pathology at the right knee.  Mild red marrow reconversion.  This is most frequently seen with obesity, smoking, and/or anemia     Xray Knee 01/06/23:  FINDINGS:  No fracture. No dislocation.  Regional soft tissues are normal.     Impression:  No acute osseous abnormality.     Micro/Path/Other:  None     Assessment/Plan:  Lina Kat is a 48 y.o.female with soft, nontender, nonerythematous subcutaneous R knee mass. Consistent with lipoma. MRI indicates subcutaneous fat at the anteromedial knee, related to body habitus or unencapsulated subcutaneous lipoma. Patient with persistent pain during ambulation. She had recent Gastric sleeve on March 1st and is now ready for excision of mass so that she can exercise and continue to loose weight.     -Will book for excision of right knee soft tissue mass on 3/29      Kendall Nascimento MD   PGY-1   [facility]  Surgery

## 2023-03-26 ENCOUNTER — PATIENT MESSAGE (OUTPATIENT)
Dept: ADMINISTRATIVE | Facility: OTHER | Age: 48
End: 2023-03-26
Payer: MEDICAID

## 2023-03-26 ENCOUNTER — HOSPITAL ENCOUNTER (EMERGENCY)
Facility: HOSPITAL | Age: 48
Discharge: HOME | End: 2023-03-26
Attending: INTERNAL MEDICINE
Payer: MEDICAID

## 2023-03-26 VITALS
HEART RATE: 80 BPM | WEIGHT: 240.31 LBS | RESPIRATION RATE: 18 BRPM | SYSTOLIC BLOOD PRESSURE: 122 MMHG | BODY MASS INDEX: 34.4 KG/M2 | DIASTOLIC BLOOD PRESSURE: 80 MMHG | OXYGEN SATURATION: 100 % | TEMPERATURE: 98 F | HEIGHT: 70 IN

## 2023-03-26 DIAGNOSIS — M25.569 KNEE PAIN: ICD-10-CM

## 2023-03-26 DIAGNOSIS — M25.462 EFFUSION OF LEFT KNEE: ICD-10-CM

## 2023-03-26 DIAGNOSIS — M25.562 ACUTE PAIN OF LEFT KNEE: Primary | ICD-10-CM

## 2023-03-26 PROCEDURE — 63600175 PHARM REV CODE 636 W HCPCS: Performed by: PHYSICIAN ASSISTANT

## 2023-03-26 PROCEDURE — 99284 EMERGENCY DEPT VISIT MOD MDM: CPT

## 2023-03-26 PROCEDURE — 96372 THER/PROPH/DIAG INJ SC/IM: CPT | Performed by: PHYSICIAN ASSISTANT

## 2023-03-26 RX ORDER — BACLOFEN 10 MG/1
10 TABLET ORAL 3 TIMES DAILY
Qty: 30 TABLET | Refills: 0 | Status: SHIPPED | OUTPATIENT
Start: 2023-03-26 | End: 2023-04-17

## 2023-03-26 RX ORDER — KETOROLAC TROMETHAMINE 30 MG/ML
60 INJECTION, SOLUTION INTRAMUSCULAR; INTRAVENOUS
Status: COMPLETED | OUTPATIENT
Start: 2023-03-26 | End: 2023-03-26

## 2023-03-26 RX ORDER — DICLOFENAC SODIUM 75 MG/1
75 TABLET, DELAYED RELEASE ORAL 2 TIMES DAILY
Qty: 20 TABLET | Refills: 0 | Status: SHIPPED | OUTPATIENT
Start: 2023-03-26 | End: 2023-04-17

## 2023-03-26 RX ORDER — METHYLPREDNISOLONE 4 MG/1
TABLET ORAL
Qty: 1 EACH | Refills: 0 | Status: SHIPPED | OUTPATIENT
Start: 2023-03-26 | End: 2023-04-17

## 2023-03-26 RX ADMIN — KETOROLAC TROMETHAMINE 60 MG: 30 INJECTION, SOLUTION INTRAMUSCULAR at 12:03

## 2023-03-26 NOTE — ED PROVIDER NOTES
Encounter Date: 3/26/2023       History     Chief Complaint   Patient presents with    Knee Pain     Pt reports nontraumatic left sided knee pain and swelling x 3 days.      49 YO WF in ER with complaints of medial left knee pain  x3 days. States she was getting up from a seated position and she felt and heard a pop and has been having pain and swelling since. Denies fever, chills, chest pain, SOB, abdominal pain, N/V/D, HA or dizziness. No other complaints.     The history is provided by the patient.   Review of patient's allergies indicates:   Allergen Reactions    Codeine Swelling     Past Medical History:   Diagnosis Date    Anxiety     Depression     Hyperlipidemia     Hypertension     UBALDO (obstructive sleep apnea)      Past Surgical History:   Procedure Laterality Date    CHOLECYSTECTOMY      gastric sleeve  2023    TUBAL LIGATION       Family History   Problem Relation Age of Onset    Hypertension Mother     Heart attack Father     Diabetes type II Father     Hypertension Brother     Brain cancer Maternal Aunt     Brain cancer Paternal Uncle      Social History     Tobacco Use    Smoking status: Former     Packs/day: 1.00     Years: 15.00     Pack years: 15.00     Types: Cigarettes     Quit date: 2022     Years since quittin.3    Smokeless tobacco: Never    Tobacco comments:     Pt quit 4 days ago.   Substance Use Topics    Alcohol use: Never    Drug use: Never     Review of Systems   Constitutional:  Negative for chills and fever.   HENT:  Negative for congestion and sore throat.    Respiratory:  Negative for shortness of breath.    Cardiovascular:  Negative for chest pain.   Gastrointestinal:  Negative for abdominal pain, diarrhea, nausea and vomiting.   Genitourinary:  Negative for dysuria.   Musculoskeletal:  Positive for arthralgias and joint swelling. Negative for back pain.   Skin:  Negative for rash.   Neurological:  Negative for dizziness, weakness, light-headedness and headaches.    Hematological:  Does not bruise/bleed easily.   All other systems reviewed and are negative.    Physical Exam     Initial Vitals [03/26/23 1206]   BP Pulse Resp Temp SpO2   125/88 76 16 98.2 °F (36.8 °C) 98 %      MAP       --         Physical Exam    Nursing note and vitals reviewed.  Constitutional: She appears well-developed and well-nourished. She is not diaphoretic. No distress.   HENT:   Head: Normocephalic and atraumatic.   Nose: Nose normal.   Eyes: Conjunctivae are normal.   Neck: Neck supple.   Cardiovascular:  Normal rate, regular rhythm and intact distal pulses.           Pulmonary/Chest: Breath sounds normal. No respiratory distress.   Musculoskeletal:      Cervical back: Neck supple.      Left knee: Swelling, effusion and bony tenderness (at patella) present. No crepitus. Decreased range of motion (with flexion). Tenderness present over the medial joint line. Normal pulse.     Neurological: She is alert and oriented to person, place, and time. She has normal strength.   Skin: Skin is warm and dry.   Psychiatric: She has a normal mood and affect.       ED Course   Procedures  Labs Reviewed - No data to display       Imaging Results              X-Ray Knee 3 View Left (Final result)  Result time 03/26/23 13:23:41      Final result by Amado Adams MD (03/26/23 13:23:41)                   Impression:      No definite evidence of acute displaced fracture or active dislocation is visualized.  There appears to be evidence of suprapatellar recess joint fluid.      Electronically signed by: Amado Adams MD  Date:    03/26/2023  Time:    13:23               Narrative:    EXAMINATION:  XR KNEE 3 VIEW LEFT    CLINICAL HISTORY:  Pain in unspecified knee.  Left knee pain.    COMPARISON:  None    FINDINGS:  Three views of left knee were obtained.  No definite evidence of acute displaced fracture or active dislocation is visualized.  Mild lateral patellofemoral compartment joint space narrowing is seen on the  sunrise view.  Tiny medial and patellofemoral compartment marginal osteophytes are noted.  There appears to be evidence of suprapatellar recess joint fluid.  No radiopaque foreign bodies are seen.                                       Medications   ketorolac injection 60 mg (60 mg Intramuscular Given 3/26/23 1241)                 ED Course as of 03/26/23 1415   Sun Mar 26, 2023   1400 VSS, NAD, pt is non-toxic or ill appearing, imaging reviewed with pt, treatment plan and discharge instructions including follow up discussed, pt verbalized understanding, all questions answered, pt is stable and ready for discharge  [TT]      ED Course User Index  [TT] NANCY Doshi                 Clinical Impression:   Final diagnoses:  [M25.569] Knee pain  [M25.562] Acute pain of left knee (Primary)  [M25.462] Effusion of left knee        ED Disposition Condition    Discharge Stable          ED Prescriptions       Medication Sig Dispense Start Date End Date Auth. Provider    baclofen (LIORESAL) 10 MG tablet Take 1 tablet (10 mg total) by mouth 3 (three) times daily. for 10 days 30 tablet 3/26/2023 4/5/2023 NANCY Doshi    diclofenac (VOLTAREN) 75 MG EC tablet Take 1 tablet (75 mg total) by mouth 2 (two) times daily. 20 tablet 3/26/2023 -- NANCY Doshi    methylPREDNISolone (MEDROL DOSEPACK) 4 mg tablet Take as package instructs 1 each 3/26/2023 -- NANCY Doshi          Follow-up Information       Follow up With Specialties Details Why Contact Info    [facility] Schedule an appointment as soon as possible for a visit in 3 days  [address] [facility] [address]  [phone]      [facility] Emergency Medicine In 3 days As needed, If symptoms worsen [address] [facility] [address] [phone]             NANCY Doshi  03/26/23 3947

## 2023-03-26 NOTE — DISCHARGE INSTRUCTIONS
Take all medications as prescribed.     Drink plenty of fluids and get a lot of rest.     Keep knee elevated, use ACE or knee wrap, ice to knee every 4-6 hours.    Follow up with your PCP in 3-5 days.     Return to ER for any changes or worsening of symptoms.

## 2023-03-27 ENCOUNTER — PATIENT MESSAGE (OUTPATIENT)
Dept: ADMINISTRATIVE | Facility: OTHER | Age: 48
End: 2023-03-27
Payer: MEDICAID

## 2023-03-27 ENCOUNTER — ANESTHESIA EVENT (OUTPATIENT)
Dept: SURGERY | Facility: HOSPITAL | Age: 48
End: 2023-03-27
Payer: MEDICAID

## 2023-03-27 NOTE — ANESTHESIA PREPROCEDURE EVALUATION
03/27/2023  Lina Kat is a 48 y.o., female for right LE/anterior knee soft tissue excision .      History of Gastric sleeve excision 3.1.23, HTN (last approx 1 week ago) , GERD  (well controlled) , HLD , Former Smoker (quit 2022 Dec, previous 50 pack year) , anterior airway noted    LMP approx 5 years ago    Vitals:    03/29/23 0606 03/29/23 0613 03/29/23 0801   BP:  131/87 126/73   Pulse:  77 76   Resp:   20   Temp:  36.7 °C (98.1 °F) 36.3 °C (97.3 °F)   TempSrc:  Oral Temporal   SpO2:  (!) 94% 96%   Weight: 109 kg (240 lb 4.8 oz)         Active Ambulatory Problems     Diagnosis Date Noted    Hypertension 05/25/2022    Hypertriglyceridemia 05/25/2022    Colon cancer screening 05/25/2022    Epigastric abdominal pain 05/25/2022    UBALDO (obstructive sleep apnea) 05/25/2022    Tobacco user 05/25/2022    BMI 40.0-44.9, adult 08/25/2022    Preoperative clearance 09/22/2022    Immunization due 12/16/2022    Impaired fasting glucose 12/16/2022    Wheezing 12/16/2022    Recurrent major depressive disorder, in full remission 12/16/2022    OAB (overactive bladder) 12/16/2022    Lipoma of extremity 12/16/2022    Boil 12/16/2022     Resolved Ambulatory Problems     Diagnosis Date Noted    Vitamin D insufficiency 05/25/2022    Furuncle of left axilla 05/25/2022    Hypervitaminosis 08/25/2022    Acute bronchitis 09/22/2022     Past Medical History:   Diagnosis Date    Anxiety     Depression     Hyperlipidemia      Lab Results   Component Value Date    WBC 7.1 12/12/2022    HGB 15.2 12/12/2022    HCT 46.8 12/12/2022     12/12/2022    CHOL 190 12/12/2022    TRIG 187 (H) 12/12/2022    HDL 42 12/12/2022    ALT 32 12/12/2022    AST 18 12/12/2022     12/12/2022    K 4.8 12/12/2022    CREATININE 0.92 12/12/2022    BUN 18.4 12/12/2022    CO2 24 12/12/2022    TSH 2.3244 08/17/2022    HGBA1C 5.6  05/24/2022     Vent. Rate : 099 BPM     Atrial Rate : 099 BPM      P-R Int : 154 ms          QRS Dur : 072 ms       QT Int : 354 ms       P-R-T Axes : 052 059 043 degrees      QTc Int : 454 ms     Normal sinus rhythm   Abnormal R wave progression   Abnormal ECG   No previous ECGs available   Confirmed by Charley Haley MD ([id number]) on 10/24/2022 5:32:40 PM    Pre-op Assessment    I have reviewed the Patient Summary Reports.     I have reviewed the Nursing Notes. I have reviewed the NPO Status.   I have reviewed the Medications.     Review of Systems  Anesthesia Hx:  No problems with previous Anesthesia  History of prior surgery of interest to airway management or planning: Denies Family Hx of Anesthesia complications.   Denies Personal Hx of Anesthesia complications.   Hematology/Oncology:  Hematology Normal   Oncology Normal     EENT/Dental:EENT/Dental Normal   Cardiovascular:   Exercise tolerance: good Hypertension    Pulmonary:  Pulmonary Normal    Renal/:  Renal/ Normal     Hepatic/GI:  Hepatic/GI Normal    Musculoskeletal:  Musculoskeletal Normal    Neurological:  Neurology Normal    Endocrine:  Endocrine Normal    Dermatological:  Skin Normal    Psych:  Psychiatric Normal           Physical Exam  General: Cooperative, Well nourished, Alert and Oriented    Airway:  Mallampati: III   Mouth Opening: Normal  TM Distance: 4 - 6 cm  Tongue: Normal  Neck ROM: Normal ROM    Dental:  Periodontal disease  Decaying dentition noted at multiple sites -- patient is aware of risk of damage and dislodgment          Anesthesia Plan  Type of Anesthesia, risks & benefits discussed:    Anesthesia Type: Gen Supraglottic Airway  Intra-op Monitoring Plan: Standard ASA Monitors  Post Op Pain Control Plan: multimodal analgesia and IV/PO Opioids PRN  Induction:  IV  Airway Plan: Direct  Informed Consent: Informed consent signed with the Patient and all parties understand the risks and agree with anesthesia plan.  All questions  answered. Patient consented to blood products? No  ASA Score: 3  Day of Surgery Review of History & Physical: H&P Update referred to the surgeon/provider.    Ready For Surgery From Anesthesia Perspective.     .

## 2023-03-28 ENCOUNTER — PATIENT MESSAGE (OUTPATIENT)
Dept: ADMINISTRATIVE | Facility: OTHER | Age: 48
End: 2023-03-28
Payer: MEDICAID

## 2023-03-29 ENCOUNTER — ANESTHESIA (OUTPATIENT)
Dept: SURGERY | Facility: HOSPITAL | Age: 48
End: 2023-03-29
Payer: MEDICAID

## 2023-03-29 ENCOUNTER — HOSPITAL ENCOUNTER (OUTPATIENT)
Facility: HOSPITAL | Age: 48
Discharge: HOME | End: 2023-03-29
Attending: STUDENT IN AN ORGANIZED HEALTH CARE EDUCATION/TRAINING PROGRAM | Admitting: STUDENT IN AN ORGANIZED HEALTH CARE EDUCATION/TRAINING PROGRAM
Payer: MEDICAID

## 2023-03-29 DIAGNOSIS — D17.9 LIPOMA, UNSPECIFIED SITE: ICD-10-CM

## 2023-03-29 DIAGNOSIS — R07.9 CHEST PAIN: ICD-10-CM

## 2023-03-29 DIAGNOSIS — D17.20 LIPOMA OF EXTREMITY: Primary | ICD-10-CM

## 2023-03-29 DIAGNOSIS — D17.9 LIPOMA: ICD-10-CM

## 2023-03-29 LAB
B-HCG UR QL: NEGATIVE
CTP QC/QA: YES
TROPONIN I SERPL-MCNC: <0.01 NG/ML (ref 0–0.04)

## 2023-03-29 PROCEDURE — 88304 TISSUE EXAM BY PATHOLOGIST: CPT | Mod: TC | Performed by: STUDENT IN AN ORGANIZED HEALTH CARE EDUCATION/TRAINING PROGRAM

## 2023-03-29 PROCEDURE — 36000707: Performed by: STUDENT IN AN ORGANIZED HEALTH CARE EDUCATION/TRAINING PROGRAM

## 2023-03-29 PROCEDURE — 71000033 HC RECOVERY, INTIAL HOUR: Performed by: STUDENT IN AN ORGANIZED HEALTH CARE EDUCATION/TRAINING PROGRAM

## 2023-03-29 PROCEDURE — 37000008 HC ANESTHESIA 1ST 15 MINUTES: Performed by: STUDENT IN AN ORGANIZED HEALTH CARE EDUCATION/TRAINING PROGRAM

## 2023-03-29 PROCEDURE — 25000003 PHARM REV CODE 250: Performed by: NURSE ANESTHETIST, CERTIFIED REGISTERED

## 2023-03-29 PROCEDURE — 93005 ELECTROCARDIOGRAM TRACING: CPT | Mod: 59

## 2023-03-29 PROCEDURE — 71000016 HC POSTOP RECOV ADDL HR: Performed by: STUDENT IN AN ORGANIZED HEALTH CARE EDUCATION/TRAINING PROGRAM

## 2023-03-29 PROCEDURE — 25000003 PHARM REV CODE 250: Performed by: SPECIALIST

## 2023-03-29 PROCEDURE — 00400 ANES INTEGUMENTARY SYS NOS: CPT | Performed by: STUDENT IN AN ORGANIZED HEALTH CARE EDUCATION/TRAINING PROGRAM

## 2023-03-29 PROCEDURE — 25000003 PHARM REV CODE 250: Performed by: STUDENT IN AN ORGANIZED HEALTH CARE EDUCATION/TRAINING PROGRAM

## 2023-03-29 PROCEDURE — 63600175 PHARM REV CODE 636 W HCPCS: Performed by: SPECIALIST

## 2023-03-29 PROCEDURE — 63600175 PHARM REV CODE 636 W HCPCS

## 2023-03-29 PROCEDURE — 71000015 HC POSTOP RECOV 1ST HR: Performed by: STUDENT IN AN ORGANIZED HEALTH CARE EDUCATION/TRAINING PROGRAM

## 2023-03-29 PROCEDURE — 71000039 HC RECOVERY, EACH ADD'L HOUR: Performed by: STUDENT IN AN ORGANIZED HEALTH CARE EDUCATION/TRAINING PROGRAM

## 2023-03-29 PROCEDURE — 25000003 PHARM REV CODE 250

## 2023-03-29 PROCEDURE — 37000009 HC ANESTHESIA EA ADD 15 MINS: Performed by: STUDENT IN AN ORGANIZED HEALTH CARE EDUCATION/TRAINING PROGRAM

## 2023-03-29 PROCEDURE — 84484 ASSAY OF TROPONIN QUANT: CPT | Performed by: SPECIALIST

## 2023-03-29 PROCEDURE — 36000706: Performed by: STUDENT IN AN ORGANIZED HEALTH CARE EDUCATION/TRAINING PROGRAM

## 2023-03-29 PROCEDURE — 63600175 PHARM REV CODE 636 W HCPCS: Performed by: NURSE ANESTHETIST, CERTIFIED REGISTERED

## 2023-03-29 RX ORDER — HYDROMORPHONE HYDROCHLORIDE 1 MG/ML
0.2 INJECTION, SOLUTION INTRAMUSCULAR; INTRAVENOUS; SUBCUTANEOUS EVERY 5 MIN PRN
Status: DISCONTINUED | OUTPATIENT
Start: 2023-03-29 | End: 2023-03-29 | Stop reason: HOSPADM

## 2023-03-29 RX ORDER — MORPHINE SULFATE 2 MG/ML
2 INJECTION, SOLUTION INTRAMUSCULAR; INTRAVENOUS EVERY 5 MIN PRN
Status: DISCONTINUED | OUTPATIENT
Start: 2023-03-29 | End: 2023-03-29 | Stop reason: HOSPADM

## 2023-03-29 RX ORDER — PHENYLEPHRINE HYDROCHLORIDE 10 MG/ML
INJECTION INTRAVENOUS
Status: DISCONTINUED | OUTPATIENT
Start: 2023-03-29 | End: 2023-03-29

## 2023-03-29 RX ORDER — HYDROMORPHONE HYDROCHLORIDE 1 MG/ML
0.5 INJECTION, SOLUTION INTRAMUSCULAR; INTRAVENOUS; SUBCUTANEOUS EVERY 5 MIN PRN
Status: DISCONTINUED | OUTPATIENT
Start: 2023-03-29 | End: 2023-03-29 | Stop reason: HOSPADM

## 2023-03-29 RX ORDER — SODIUM CHLORIDE, SODIUM LACTATE, POTASSIUM CHLORIDE, CALCIUM CHLORIDE 600; 310; 30; 20 MG/100ML; MG/100ML; MG/100ML; MG/100ML
INJECTION, SOLUTION INTRAVENOUS CONTINUOUS
Status: CANCELLED | OUTPATIENT
Start: 2023-03-29

## 2023-03-29 RX ORDER — SODIUM CHLORIDE 9 MG/ML
INJECTION, SOLUTION INTRAVENOUS CONTINUOUS
Status: DISCONTINUED | OUTPATIENT
Start: 2023-03-29 | End: 2023-03-29 | Stop reason: HOSPADM

## 2023-03-29 RX ORDER — FAMOTIDINE 10 MG/ML
20 INJECTION INTRAVENOUS ONCE
Status: COMPLETED | OUTPATIENT
Start: 2023-03-29 | End: 2023-03-29

## 2023-03-29 RX ORDER — MIDAZOLAM HYDROCHLORIDE 1 MG/ML
INJECTION INTRAMUSCULAR; INTRAVENOUS
Status: DISCONTINUED
Start: 2023-03-29 | End: 2023-03-29 | Stop reason: HOSPADM

## 2023-03-29 RX ORDER — PROCHLORPERAZINE EDISYLATE 5 MG/ML
5 INJECTION INTRAMUSCULAR; INTRAVENOUS ONCE AS NEEDED
Status: DISCONTINUED | OUTPATIENT
Start: 2023-03-29 | End: 2023-03-29 | Stop reason: HOSPADM

## 2023-03-29 RX ORDER — ONDANSETRON 2 MG/ML
INJECTION INTRAMUSCULAR; INTRAVENOUS
Status: DISCONTINUED | OUTPATIENT
Start: 2023-03-29 | End: 2023-03-29

## 2023-03-29 RX ORDER — MEPERIDINE HYDROCHLORIDE 25 MG/ML
12.5 INJECTION INTRAMUSCULAR; INTRAVENOUS; SUBCUTANEOUS ONCE
Status: DISCONTINUED | OUTPATIENT
Start: 2023-03-29 | End: 2023-03-29 | Stop reason: HOSPADM

## 2023-03-29 RX ORDER — PROPOFOL 10 MG/ML
INJECTION, EMULSION INTRAVENOUS
Status: DISCONTINUED | OUTPATIENT
Start: 2023-03-29 | End: 2023-03-29

## 2023-03-29 RX ORDER — FENTANYL CITRATE 50 UG/ML
INJECTION, SOLUTION INTRAMUSCULAR; INTRAVENOUS
Status: DISCONTINUED | OUTPATIENT
Start: 2023-03-29 | End: 2023-03-29

## 2023-03-29 RX ORDER — MORPHINE SULFATE 2 MG/ML
INJECTION, SOLUTION INTRAMUSCULAR; INTRAVENOUS
Status: DISCONTINUED
Start: 2023-03-29 | End: 2023-03-29 | Stop reason: HOSPADM

## 2023-03-29 RX ORDER — HEPARIN SODIUM 5000 [USP'U]/ML
5000 INJECTION, SOLUTION INTRAVENOUS; SUBCUTANEOUS
Status: COMPLETED | OUTPATIENT
Start: 2023-03-29 | End: 2023-03-29

## 2023-03-29 RX ORDER — DEXAMETHASONE SODIUM PHOSPHATE 4 MG/ML
INJECTION, SOLUTION INTRA-ARTICULAR; INTRALESIONAL; INTRAMUSCULAR; INTRAVENOUS; SOFT TISSUE
Status: DISCONTINUED | OUTPATIENT
Start: 2023-03-29 | End: 2023-03-29

## 2023-03-29 RX ORDER — HYDROMORPHONE HYDROCHLORIDE 1 MG/ML
INJECTION, SOLUTION INTRAMUSCULAR; INTRAVENOUS; SUBCUTANEOUS
Status: DISCONTINUED
Start: 2023-03-29 | End: 2023-03-29 | Stop reason: HOSPADM

## 2023-03-29 RX ORDER — MIDAZOLAM HYDROCHLORIDE 1 MG/ML
2 INJECTION INTRAMUSCULAR; INTRAVENOUS ONCE AS NEEDED
Status: COMPLETED | OUTPATIENT
Start: 2023-03-29 | End: 2023-03-29

## 2023-03-29 RX ORDER — MORPHINE SULFATE 2 MG/ML
INJECTION, SOLUTION INTRAMUSCULAR; INTRAVENOUS
Status: COMPLETED
Start: 2023-03-29 | End: 2023-03-29

## 2023-03-29 RX ORDER — ONDANSETRON 2 MG/ML
4 INJECTION INTRAMUSCULAR; INTRAVENOUS ONCE
Status: DISCONTINUED | OUTPATIENT
Start: 2023-03-29 | End: 2023-03-29

## 2023-03-29 RX ORDER — DIPHENHYDRAMINE HYDROCHLORIDE 50 MG/ML
25 INJECTION INTRAMUSCULAR; INTRAVENOUS ONCE AS NEEDED
Status: DISCONTINUED | OUTPATIENT
Start: 2023-03-29 | End: 2023-03-29 | Stop reason: HOSPADM

## 2023-03-29 RX ORDER — ONDANSETRON 2 MG/ML
4 INJECTION INTRAMUSCULAR; INTRAVENOUS ONCE
Status: DISCONTINUED | OUTPATIENT
Start: 2023-03-29 | End: 2023-03-29 | Stop reason: HOSPADM

## 2023-03-29 RX ORDER — BUPIVACAINE HYDROCHLORIDE 2.5 MG/ML
INJECTION, SOLUTION EPIDURAL; INFILTRATION; INTRACAUDAL
Status: DISCONTINUED
Start: 2023-03-29 | End: 2023-03-29 | Stop reason: HOSPADM

## 2023-03-29 RX ORDER — SIMETHICONE 80 MG
1 TABLET,CHEWABLE ORAL ONCE
Status: COMPLETED | OUTPATIENT
Start: 2023-03-29 | End: 2023-03-29

## 2023-03-29 RX ORDER — DIPHENHYDRAMINE HYDROCHLORIDE 50 MG/ML
25 INJECTION INTRAMUSCULAR; INTRAVENOUS ONCE AS NEEDED
Status: DISCONTINUED | OUTPATIENT
Start: 2023-03-29 | End: 2023-03-29

## 2023-03-29 RX ORDER — CEFAZOLIN SODIUM 1 G/3ML
2 INJECTION, POWDER, FOR SOLUTION INTRAMUSCULAR; INTRAVENOUS
Status: COMPLETED | OUTPATIENT
Start: 2023-03-29 | End: 2023-03-29

## 2023-03-29 RX ORDER — PROCHLORPERAZINE EDISYLATE 5 MG/ML
5 INJECTION INTRAMUSCULAR; INTRAVENOUS ONCE AS NEEDED
Status: DISCONTINUED | OUTPATIENT
Start: 2023-03-29 | End: 2023-03-29

## 2023-03-29 RX ORDER — IPRATROPIUM BROMIDE AND ALBUTEROL SULFATE 2.5; .5 MG/3ML; MG/3ML
3 SOLUTION RESPIRATORY (INHALATION) ONCE AS NEEDED
Status: DISCONTINUED | OUTPATIENT
Start: 2023-03-29 | End: 2023-03-29

## 2023-03-29 RX ORDER — OXYCODONE AND ACETAMINOPHEN 5; 325 MG/1; MG/1
2 TABLET ORAL ONCE
Status: DISCONTINUED | OUTPATIENT
Start: 2023-03-29 | End: 2023-03-29 | Stop reason: HOSPADM

## 2023-03-29 RX ORDER — HYDROCODONE BITARTRATE AND ACETAMINOPHEN 5; 325 MG/1; MG/1
1 TABLET ORAL EVERY 6 HOURS PRN
Qty: 5 TABLET | Refills: 0 | Status: SHIPPED | OUTPATIENT
Start: 2023-03-29 | End: 2023-04-17

## 2023-03-29 RX ORDER — FAMOTIDINE 10 MG/ML
INJECTION INTRAVENOUS
Status: COMPLETED
Start: 2023-03-29 | End: 2023-03-29

## 2023-03-29 RX ORDER — BUPIVACAINE HYDROCHLORIDE 2.5 MG/ML
INJECTION, SOLUTION EPIDURAL; INFILTRATION; INTRACAUDAL
Status: DISCONTINUED | OUTPATIENT
Start: 2023-03-29 | End: 2023-03-29 | Stop reason: HOSPADM

## 2023-03-29 RX ORDER — IPRATROPIUM BROMIDE AND ALBUTEROL SULFATE 2.5; .5 MG/3ML; MG/3ML
3 SOLUTION RESPIRATORY (INHALATION) ONCE AS NEEDED
Status: DISCONTINUED | OUTPATIENT
Start: 2023-03-29 | End: 2023-03-29 | Stop reason: HOSPADM

## 2023-03-29 RX ORDER — LIDOCAINE HYDROCHLORIDE 20 MG/ML
INJECTION INTRAVENOUS
Status: DISCONTINUED | OUTPATIENT
Start: 2023-03-29 | End: 2023-03-29

## 2023-03-29 RX ORDER — GLYCOPYRROLATE 0.2 MG/ML
INJECTION INTRAMUSCULAR; INTRAVENOUS
Status: DISCONTINUED | OUTPATIENT
Start: 2023-03-29 | End: 2023-03-29

## 2023-03-29 RX ADMIN — DEXAMETHASONE SODIUM PHOSPHATE 8 MG: 4 INJECTION, SOLUTION INTRA-ARTICULAR; INTRALESIONAL; INTRAMUSCULAR; INTRAVENOUS; SOFT TISSUE at 08:03

## 2023-03-29 RX ADMIN — FENTANYL CITRATE 50 MCG: 50 INJECTION, SOLUTION INTRAMUSCULAR; INTRAVENOUS at 09:03

## 2023-03-29 RX ADMIN — PHENYLEPHRINE HYDROCHLORIDE 50 MCG: 10 INJECTION INTRAVENOUS at 09:03

## 2023-03-29 RX ADMIN — PROPOFOL 200 MG: 10 INJECTION, EMULSION INTRAVENOUS at 08:03

## 2023-03-29 RX ADMIN — HYDROMORPHONE HYDROCHLORIDE 0.5 MG: 1 INJECTION, SOLUTION INTRAMUSCULAR; INTRAVENOUS; SUBCUTANEOUS at 10:03

## 2023-03-29 RX ADMIN — MIDAZOLAM HYDROCHLORIDE 2 MG: 1 INJECTION, SOLUTION INTRAMUSCULAR; INTRAVENOUS at 08:03

## 2023-03-29 RX ADMIN — MORPHINE SULFATE 2 MG: 2 INJECTION, SOLUTION INTRAMUSCULAR; INTRAVENOUS at 10:03

## 2023-03-29 RX ADMIN — HEPARIN SODIUM 5000 UNITS: 5000 INJECTION, SOLUTION INTRAVENOUS; SUBCUTANEOUS at 06:03

## 2023-03-29 RX ADMIN — Medication 80 MG: at 10:03

## 2023-03-29 RX ADMIN — FAMOTIDINE 20 MG: 10 INJECTION INTRAVENOUS at 10:03

## 2023-03-29 RX ADMIN — LIDOCAINE HYDROCHLORIDE 50 MG: 20 INJECTION, SOLUTION INTRAVENOUS at 08:03

## 2023-03-29 RX ADMIN — GLYCOPYRROLATE 0.2 MG: 0.2 INJECTION INTRAMUSCULAR; INTRAVENOUS at 08:03

## 2023-03-29 RX ADMIN — CEFAZOLIN 2 G: 330 INJECTION, POWDER, FOR SOLUTION INTRAMUSCULAR; INTRAVENOUS at 08:03

## 2023-03-29 RX ADMIN — FAMOTIDINE 20 MG: 10 INJECTION, SOLUTION INTRAVENOUS at 10:03

## 2023-03-29 RX ADMIN — FENTANYL CITRATE 50 MCG: 50 INJECTION, SOLUTION INTRAMUSCULAR; INTRAVENOUS at 08:03

## 2023-03-29 RX ADMIN — ONDANSETRON 4 MG: 2 INJECTION INTRAMUSCULAR; INTRAVENOUS at 08:03

## 2023-03-29 RX ADMIN — SODIUM CHLORIDE, POTASSIUM CHLORIDE, SODIUM LACTATE AND CALCIUM CHLORIDE: 600; 310; 30; 20 INJECTION, SOLUTION INTRAVENOUS at 08:03

## 2023-03-29 RX ADMIN — SODIUM CHLORIDE: 9 INJECTION, SOLUTION INTRAVENOUS at 08:03

## 2023-03-29 NOTE — OP NOTE
[facility]  Operative Note      Date of Procedure: 3/29/2023     Procedure: Procedure(s) (LRB):  EXCISION, SOFT TISSUE (Right)     Surgeon(s) and Role:     * Cody Joseph MD - Primary     * Knedall Nascimento MD - Resident - Assisting        Pre-Operative Diagnosis: Subcutaneous mass overlying right knee     Post-Operative Diagnosis: Post-Op Diagnosis Codes:     * Lipoma, unspecified site [D17.9]    Anesthesia: General/MAC    Operative Findings (including complications, if any): lobulated lipomatous mass with ill defined capsule inferior-lateral to patella     Description of Technical Procedures:   Patient evaluated, consented prior to surgery.      The patient was intubated without complications. Patient was positioned supine, prepped, and draped in the standard fashion exposing Right lower extremity. Timeout was completed. Vertical incision was made w/ a 15 blade overlying soft tissue mass located inferolaterally to right patella.  Using cautery, blunt dissection, and manual dissection, the soft tissue mass was freed from the surrounding tissue. Mass was lipomatous in nature and had areas that were ill defined but attached to encapsulated portion. Mass was excised and cavity thoroughly irrigated. Hemostasis achieved prior to closure. 10cc Bupivacaine was used to locally anesthetize wound.      The incision was closed with 3 interrupted subcutaneous 3-0 vicryl sutures, tacking to base of incision to approximate cavity. 3 2-0 Nylon vertical mattress sutures to close the epidermis. At the completion of skin closure, further local anesthesia administered. Patient tolerated the procedure well without complications. Patient was extubated and taken to the postoperative care area.      Dr. Joseph was present for the entirety of the procedure          Estimated Blood Loss (EBL): 10cc           Implants: * No implants in log *    Specimens:   Specimen (24h ago, onward)       Start     Ordered     03/29/23 0908  Specimen to Pathology  RELEASE UPON ORDERING        References:    Click here for ordering Quick Tip   Question:  Release to patient  Answer:  Immediate    03/29/23 0908                            Condition: Good    Disposition: PACU - hemodynamically stable.

## 2023-03-29 NOTE — INTERVAL H&P NOTE
Lina Kat is a 48 y.o.female with soft, nontender, nonerythematous subcutaneous R knee mass. Consistent with lipoma.     To OR today for excision of right knee soft tissue mass         The patient has been examined and the H&P has been reviewed:    I concur with the findings and no changes have occurred since H&P was written.    Surgery risks, benefits and alternative options discussed and understood by patient/family.          There are no hospital problems to display for this patient.

## 2023-03-29 NOTE — TRANSFER OF CARE
Anesthesia Transfer of Care Note    Patient: Lina Kat    Procedure(s) Performed: Procedure(s) (LRB):  EXCISION, SOFT TISSUE (Right)    Patient location: PACU    Anesthesia Type: general    Transport from OR: Transported from OR on room air with adequate spontaneous ventilation    Post pain: adequate analgesia    Post assessment: no apparent anesthetic complications    Post vital signs: stable    Level of consciousness: sedated    Nausea/Vomiting: no nausea/vomiting    Complications: none    Transfer of care protocol was followed      Last vitals:

## 2023-03-29 NOTE — ANESTHESIA POSTPROCEDURE EVALUATION
Anesthesia Post Evaluation    Patient: Lina Kat    Procedure(s) Performed: Procedure(s) (LRB):  EXCISION, SOFT TISSUE (Right)    Final Anesthesia Type: general      Patient location during evaluation: PACU  Patient participation: Yes- Able to Participate  Level of consciousness: awake and responds to stimulation  Post-procedure vital signs: reviewed and stable  Pain management: adequate  Airway patency: patent    PONV status at discharge: No PONV  Anesthetic complications: no      Cardiovascular status: blood pressure returned to baseline  Respiratory status: unassisted  Hydration status: euvolemic  Follow-up not needed.          Vitals Value Taken Time   /86 03/29/23 1140   Temp 36.4 °C (97.5 °F) 03/29/23 1110   Pulse 69 03/29/23 1140   Resp 18 03/29/23 1140   SpO2 98 % 03/29/23 1140         Event Time   Out of Recovery 11:05:00         Pain/Anastasiya Score: Pain Rating Prior to Med Admin: 6 (3/29/2023 10:43 AM)  Anastasiya Score: 10 (3/29/2023 11:10 AM)

## 2023-03-29 NOTE — ANESTHESIA PROCEDURE NOTES
Intubation    Date/Time: 3/29/2023 8:41 AM  Performed by: Stephanie Jha CRNA  Authorized by: Lori Sanabria MD     Intubation:     Induction:  Intravenous    Intubated:  Postinduction    Mask Ventilation:  Not attempted    Attempts:  1    Attempted By:  CRNA    Difficult Airway Encountered?: No      Complications:  None    Airway Device:  Supraglottic airway/LMA    Airway Device Size:  4.0    Placement Verified By:  Capnometry    Complicating Factors:  None    Findings Post-Intubation:  BS equal bilateral and atraumatic/condition of teeth unchanged

## 2023-03-29 NOTE — DISCHARGE INSTRUCTIONS
· Keep follow up appointment  [facility].  Resume home medications unless otherwise instructed by your doctor.    · You may take a shower starting tomorrow evening. Wash GENTLY with soap and water (do not scrub) over incision, rinse with water, and pat dry.    · Do not soak your wound in water for two weeks. Do not take baths, swim, or use a hot tub until your doctor says it is okay.    · Use pain medication as instructed. Do not take Tylenol (acetaminophen) with your NORCO, since NORCO contains Tylenol as well.    · You may use an ice pack as needed for 20 minutes at a time over your incision site to minimize swelling and help relieve pain.    - Light weight bearing to RLE for one week. No hyperflexion of knee.     · Do not drink alcohol or drive today, or as long as you are on pain medication.    · Notify MD of any moderate to severe pain unrelieved by pain medicine, if your incision opens and/or bleeds, or for any signs of infection including fever above 100.4, excessive redness or swelling, yellow/green foul- smelling drainage, nausea or vomiting. Clinics number is [phone]. If it is after business hours or emergency call [phone] and state Im having post op complications and need to speak to the surgeon on call.    · Thanks for choosing [facility]! Have a smooth recovery!

## 2023-03-30 VITALS
BODY MASS INDEX: 34.4 KG/M2 | HEART RATE: 62 BPM | WEIGHT: 240.31 LBS | OXYGEN SATURATION: 98 % | TEMPERATURE: 98 F | SYSTOLIC BLOOD PRESSURE: 131 MMHG | DIASTOLIC BLOOD PRESSURE: 87 MMHG | RESPIRATION RATE: 18 BRPM

## 2023-03-30 LAB
ESTROGEN SERPL-MCNC: NORMAL PG/ML
INSULIN SERPL-ACNC: NORMAL U[IU]/ML
LAB AP CLINICAL INFORMATION: NORMAL
LAB AP GROSS DESCRIPTION: NORMAL
LAB AP REPORT FOOTNOTES: NORMAL
T3RU NFR SERPL: NORMAL %

## 2023-03-31 NOTE — DISCHARGE SUMMARY
[facility]  Discharge Note  Short Stay    Procedure(s) (LRB):  EXCISION, MASS, LOWER EXTREMITY (Right)      OUTCOME: Condition has improved and patient is now ready for discharge.    Hospital Course:   Lina Kat is a 48 y.o.female with soft, nontender, nonerythematous subcutaneous R knee mass. Consistent with lipoma. Patient went OR today for excision of right knee soft tissue mass. Tolerated procedure well and awaken from anesthesia without complication. Patient discharged home with outpatient follow up.     DISPOSITION: Home or Self Care    FINAL DIAGNOSIS: Lipomatous soft tissue mass     FOLLOWUP: In clinic    DISCHARGE INSTRUCTIONS:    Discharge Procedure Orders   Diet Adult Regular     Notify your health care provider if you experience any of the following:  temperature >100.4     Notify your health care provider if you experience any of the following:  persistent nausea and vomiting or diarrhea     Notify your health care provider if you experience any of the following:  severe uncontrolled pain     Notify your health care provider if you experience any of the following:  redness, tenderness, or signs of infection (pain, swelling, redness, odor or green/yellow discharge around incision site)     Activity as tolerated     Weight bearing restrictions (specify):   Order Comments: Light weight bearing to RLE for one week. No hyperflexion of knee         Clinical Reference Documents Added to Patient Instructions         Document    SKIN [facility]ESION REMOVAL DISCHARGE INSTRUCTIONS (ENGLISH)            TIME SPENT ON DISCHARGE: 10 minutes

## 2023-04-11 ENCOUNTER — OFFICE VISIT (OUTPATIENT)
Dept: SURGERY | Facility: CLINIC | Age: 48
End: 2023-04-11
Payer: MEDICAID

## 2023-04-11 VITALS
RESPIRATION RATE: 18 BRPM | BODY MASS INDEX: 36.88 KG/M2 | HEART RATE: 64 BPM | WEIGHT: 235 LBS | SYSTOLIC BLOOD PRESSURE: 124 MMHG | HEIGHT: 67 IN | OXYGEN SATURATION: 99 % | DIASTOLIC BLOOD PRESSURE: 90 MMHG | TEMPERATURE: 98 F

## 2023-04-11 DIAGNOSIS — D17.20 LIPOMA OF EXTREMITY: Primary | ICD-10-CM

## 2023-04-11 PROCEDURE — 99214 OFFICE O/P EST MOD 30 MIN: CPT | Mod: PBBFAC

## 2023-04-11 NOTE — PROGRESS NOTES
Pt seen by Dr. Hu; Sutures removed; Pt instructed to return to clinic as needed; Discharge paperwork given w/pt verbalizing understanding

## 2023-04-14 ENCOUNTER — LAB VISIT (OUTPATIENT)
Dept: LAB | Facility: HOSPITAL | Age: 48
End: 2023-04-14
Payer: MEDICAID

## 2023-04-14 DIAGNOSIS — I10 PRIMARY HYPERTENSION: ICD-10-CM

## 2023-04-14 DIAGNOSIS — E78.1 HYPERTRIGLYCERIDEMIA: ICD-10-CM

## 2023-04-14 DIAGNOSIS — R73.01 IMPAIRED FASTING GLUCOSE: ICD-10-CM

## 2023-04-14 DIAGNOSIS — N32.81 OAB (OVERACTIVE BLADDER): ICD-10-CM

## 2023-04-14 DIAGNOSIS — Z11.4 SCREENING FOR HIV (HUMAN IMMUNODEFICIENCY VIRUS): ICD-10-CM

## 2023-04-14 DIAGNOSIS — Z11.59 SCREENING FOR VIRAL DISEASE: ICD-10-CM

## 2023-04-14 LAB
ALBUMIN SERPL-MCNC: 4.1 G/DL (ref 3.5–5)
ALBUMIN/GLOB SERPL: 1.3 RATIO (ref 1.1–2)
ALP SERPL-CCNC: 61 UNIT/L (ref 40–150)
ALT SERPL-CCNC: 47 UNIT/L (ref 0–55)
APPEARANCE UR: CLEAR
AST SERPL-CCNC: 26 UNIT/L (ref 5–34)
BACTERIA #/AREA URNS AUTO: ABNORMAL /HPF
BASOPHILS # BLD AUTO: 0.03 X10(3)/MCL (ref 0–0.2)
BASOPHILS NFR BLD AUTO: 0.5 %
BILIRUB UR QL STRIP.AUTO: NEGATIVE MG/DL
BILIRUBIN DIRECT+TOT PNL SERPL-MCNC: 0.5 MG/DL
BUN SERPL-MCNC: 19.3 MG/DL (ref 9.8–20.1)
CALCIUM SERPL-MCNC: 9.9 MG/DL (ref 8.4–10.2)
CHLORIDE SERPL-SCNC: 108 MMOL/L (ref 98–107)
CHOLEST SERPL-MCNC: 160 MG/DL
CHOLEST/HDLC SERPL: 4 {RATIO} (ref 0–5)
CO2 SERPL-SCNC: 27 MMOL/L (ref 22–29)
COLOR UR AUTO: YELLOW
CREAT SERPL-MCNC: 1 MG/DL (ref 0.55–1.02)
CREAT UR-MCNC: 145.6 MG/DL (ref 47–110)
EOSINOPHIL # BLD AUTO: 0.11 X10(3)/MCL (ref 0–0.9)
EOSINOPHIL NFR BLD AUTO: 1.7 %
ERYTHROCYTE [DISTWIDTH] IN BLOOD BY AUTOMATED COUNT: 13.2 % (ref 11.5–17)
EST. AVERAGE GLUCOSE BLD GHB EST-MCNC: 102.5 MG/DL
GFR SERPLBLD CREATININE-BSD FMLA CKD-EPI: >60 MLS/MIN/1.73/M2
GLOBULIN SER-MCNC: 3.2 GM/DL (ref 2.4–3.5)
GLUCOSE SERPL-MCNC: 104 MG/DL (ref 74–100)
GLUCOSE UR QL STRIP.AUTO: NORMAL MG/DL
HAV IGM SERPL QL IA: NONREACTIVE
HBA1C MFR BLD: 5.2 %
HBV CORE IGM SERPL QL IA: NONREACTIVE
HBV SURFACE AG SERPL QL IA: NONREACTIVE
HCT VFR BLD AUTO: 43.8 % (ref 37–47)
HCV AB SERPL QL IA: NONREACTIVE
HDLC SERPL-MCNC: 45 MG/DL (ref 35–60)
HGB BLD-MCNC: 14.3 G/DL (ref 12–16)
HIV 1+2 AB+HIV1 P24 AG SERPL QL IA: NONREACTIVE
HYALINE CASTS #/AREA URNS LPF: ABNORMAL /LPF
IMM GRANULOCYTES # BLD AUTO: 0.01 X10(3)/MCL (ref 0–0.04)
IMM GRANULOCYTES NFR BLD AUTO: 0.2 %
KETONES UR QL STRIP.AUTO: NEGATIVE MG/DL
LDLC SERPL CALC-MCNC: 87 MG/DL (ref 50–140)
LEUKOCYTE ESTERASE UR QL STRIP.AUTO: NEGATIVE UNIT/L
LYMPHOCYTES # BLD AUTO: 2.43 X10(3)/MCL (ref 0.6–4.6)
LYMPHOCYTES NFR BLD AUTO: 37 %
MCH RBC QN AUTO: 30.2 PG (ref 27–31)
MCHC RBC AUTO-ENTMCNC: 32.6 G/DL (ref 33–36)
MCV RBC AUTO: 92.6 FL (ref 80–94)
MICROALBUMIN UR-MCNC: <5 UG/ML
MICROALBUMIN/CREAT RATIO PNL UR: <3.4 MG/GM CR (ref 0–30)
MONOCYTES # BLD AUTO: 0.44 X10(3)/MCL (ref 0.1–1.3)
MONOCYTES NFR BLD AUTO: 6.7 %
MUCOUS THREADS URNS QL MICRO: ABNORMAL /LPF
NEUTROPHILS # BLD AUTO: 3.55 X10(3)/MCL (ref 2.1–9.2)
NEUTROPHILS NFR BLD AUTO: 53.9 %
NITRITE UR QL STRIP.AUTO: NEGATIVE
NRBC BLD AUTO-RTO: 0 %
PH UR STRIP.AUTO: 7 [PH]
PLATELET # BLD AUTO: 182 X10(3)/MCL (ref 130–400)
PMV BLD AUTO: 10.7 FL (ref 7.4–10.4)
POTASSIUM SERPL-SCNC: 4.8 MMOL/L (ref 3.5–5.1)
PROT SERPL-MCNC: 7.3 GM/DL (ref 6.4–8.3)
PROT UR QL STRIP.AUTO: NEGATIVE MG/DL
RBC # BLD AUTO: 4.73 X10(6)/MCL (ref 4.2–5.4)
RBC #/AREA URNS AUTO: ABNORMAL /HPF
RBC UR QL AUTO: NEGATIVE UNIT/L
SODIUM SERPL-SCNC: 142 MMOL/L (ref 136–145)
SP GR UR STRIP.AUTO: 1.02
SQUAMOUS #/AREA URNS LPF: ABNORMAL /HPF
TRIGL SERPL-MCNC: 138 MG/DL (ref 37–140)
UROBILINOGEN UR STRIP-ACNC: NORMAL MG/DL
VLDLC SERPL CALC-MCNC: 28 MG/DL
WBC # SPEC AUTO: 6.6 X10(3)/MCL (ref 4.5–11.5)
WBC #/AREA URNS AUTO: ABNORMAL /HPF

## 2023-04-14 PROCEDURE — 80053 COMPREHEN METABOLIC PANEL: CPT

## 2023-04-14 PROCEDURE — 85025 COMPLETE CBC W/AUTO DIFF WBC: CPT

## 2023-04-14 PROCEDURE — 36415 COLL VENOUS BLD VENIPUNCTURE: CPT

## 2023-04-14 PROCEDURE — 87077 CULTURE AEROBIC IDENTIFY: CPT

## 2023-04-14 PROCEDURE — 80061 LIPID PANEL: CPT

## 2023-04-14 PROCEDURE — 87389 HIV-1 AG W/HIV-1&-2 AB AG IA: CPT

## 2023-04-14 PROCEDURE — 80074 ACUTE HEPATITIS PANEL: CPT

## 2023-04-14 PROCEDURE — 82043 UR ALBUMIN QUANTITATIVE: CPT

## 2023-04-14 PROCEDURE — 83036 HEMOGLOBIN GLYCOSYLATED A1C: CPT

## 2023-04-14 PROCEDURE — 81001 URINALYSIS AUTO W/SCOPE: CPT

## 2023-04-15 LAB — PATH REV: NORMAL

## 2023-04-16 LAB
BACTERIA UR CULT: ABNORMAL
BACTERIA UR CULT: ABNORMAL

## 2023-04-17 ENCOUNTER — OFFICE VISIT (OUTPATIENT)
Dept: INTERNAL MEDICINE | Facility: CLINIC | Age: 48
End: 2023-04-17
Payer: MEDICAID

## 2023-04-17 VITALS
RESPIRATION RATE: 20 BRPM | HEART RATE: 74 BPM | WEIGHT: 237 LBS | BODY MASS INDEX: 37.2 KG/M2 | SYSTOLIC BLOOD PRESSURE: 125 MMHG | OXYGEN SATURATION: 96 % | HEIGHT: 67 IN | DIASTOLIC BLOOD PRESSURE: 83 MMHG | TEMPERATURE: 98 F

## 2023-04-17 DIAGNOSIS — M25.562 ACUTE PAIN OF LEFT KNEE: ICD-10-CM

## 2023-04-17 DIAGNOSIS — Z72.0 TOBACCO USER: ICD-10-CM

## 2023-04-17 DIAGNOSIS — G47.33 OSA (OBSTRUCTIVE SLEEP APNEA): ICD-10-CM

## 2023-04-17 DIAGNOSIS — F33.42 RECURRENT MAJOR DEPRESSIVE DISORDER, IN FULL REMISSION: ICD-10-CM

## 2023-04-17 DIAGNOSIS — E78.1 HYPERTRIGLYCERIDEMIA: ICD-10-CM

## 2023-04-17 DIAGNOSIS — Z23 IMMUNIZATION DUE: ICD-10-CM

## 2023-04-17 DIAGNOSIS — I10 PRIMARY HYPERTENSION: Primary | ICD-10-CM

## 2023-04-17 DIAGNOSIS — N32.81 OAB (OVERACTIVE BLADDER): ICD-10-CM

## 2023-04-17 DIAGNOSIS — Z13.0 SCREENING FOR IRON DEFICIENCY ANEMIA: ICD-10-CM

## 2023-04-17 PROCEDURE — 90471 IMMUNIZATION ADMIN: CPT | Mod: PBBFAC

## 2023-04-17 PROCEDURE — 99214 PR OFFICE/OUTPT VISIT, EST, LEVL IV, 30-39 MIN: ICD-10-PCS | Mod: S$PBB,,,

## 2023-04-17 PROCEDURE — 3079F DIAST BP 80-89 MM HG: CPT | Mod: CPTII,,,

## 2023-04-17 PROCEDURE — 3008F BODY MASS INDEX DOCD: CPT | Mod: CPTII,,,

## 2023-04-17 PROCEDURE — 3074F SYST BP LT 130 MM HG: CPT | Mod: CPTII,,,

## 2023-04-17 PROCEDURE — 99214 OFFICE O/P EST MOD 30 MIN: CPT | Mod: S$PBB,,,

## 2023-04-17 PROCEDURE — 1160F RVW MEDS BY RX/DR IN RCRD: CPT | Mod: CPTII,,,

## 2023-04-17 PROCEDURE — 3061F PR NEG MICROALBUMINURIA RESULT DOCUMENTED/REVIEW: ICD-10-PCS | Mod: CPTII,,,

## 2023-04-17 PROCEDURE — 3079F PR MOST RECENT DIASTOLIC BLOOD PRESSURE 80-89 MM HG: ICD-10-PCS | Mod: CPTII,,,

## 2023-04-17 PROCEDURE — 3074F PR MOST RECENT SYSTOLIC BLOOD PRESSURE < 130 MM HG: ICD-10-PCS | Mod: CPTII,,,

## 2023-04-17 PROCEDURE — 90750 HZV VACC RECOMBINANT IM: CPT | Mod: PBBFAC

## 2023-04-17 PROCEDURE — 1159F PR MEDICATION LIST DOCUMENTED IN MEDICAL RECORD: ICD-10-PCS | Mod: CPTII,,,

## 2023-04-17 PROCEDURE — 1159F MED LIST DOCD IN RCRD: CPT | Mod: CPTII,,,

## 2023-04-17 PROCEDURE — 3008F PR BODY MASS INDEX (BMI) DOCUMENTED: ICD-10-PCS | Mod: CPTII,,,

## 2023-04-17 PROCEDURE — 3066F PR DOCUMENTATION OF TREATMENT FOR NEPHROPATHY: ICD-10-PCS | Mod: CPTII,,,

## 2023-04-17 PROCEDURE — 3061F NEG MICROALBUMINURIA REV: CPT | Mod: CPTII,,,

## 2023-04-17 PROCEDURE — 1160F PR REVIEW ALL MEDS BY PRESCRIBER/CLIN PHARMACIST DOCUMENTED: ICD-10-PCS | Mod: CPTII,,,

## 2023-04-17 PROCEDURE — 99215 OFFICE O/P EST HI 40 MIN: CPT | Mod: PBBFAC

## 2023-04-17 PROCEDURE — 3066F NEPHROPATHY DOC TX: CPT | Mod: CPTII,,,

## 2023-04-17 RX ORDER — FENOFIBRATE 160 MG/1
160 TABLET ORAL DAILY
Qty: 90 TABLET | Refills: 1 | Status: SHIPPED | OUTPATIENT
Start: 2023-04-17 | End: 2023-08-17 | Stop reason: SDUPTHER

## 2023-04-17 RX ORDER — OXYBUTYNIN CHLORIDE 5 MG/1
5 TABLET ORAL 2 TIMES DAILY
Qty: 180 TABLET | Refills: 1 | Status: SHIPPED | OUTPATIENT
Start: 2023-04-17 | End: 2023-08-17 | Stop reason: SDUPTHER

## 2023-04-17 RX ORDER — PRAVASTATIN SODIUM 40 MG/1
40 TABLET ORAL NIGHTLY
Qty: 90 TABLET | Refills: 1 | Status: SHIPPED | OUTPATIENT
Start: 2023-04-17 | End: 2023-08-17 | Stop reason: SDUPTHER

## 2023-04-17 RX ORDER — SERTRALINE HYDROCHLORIDE 100 MG/1
200 TABLET, FILM COATED ORAL DAILY
Qty: 180 TABLET | Refills: 1 | Status: SHIPPED | OUTPATIENT
Start: 2023-04-17 | End: 2023-08-17 | Stop reason: SDUPTHER

## 2023-04-17 NOTE — PROGRESS NOTES
"    PATIENT NAME: Lina Kat  : 1975  DATE: 23  MRN: [id number]          Reason for Visit/Chief Complaint   Follow-up, Labs Only, Referral, and Knee Pain       History of Present Illness (HPI)     Lina Kat is a 48 y.o. White female presenting in clinic today for Follow-up, Labs Only, Referral, and Knee Pain. PMH of anxiety/depression (controlled), HTN, UBALDO with CPAP, tobacco use. She is followed by Dr. Zhou Rubio for bariatric surgery (VSG) which was performed on 3/1/2023. On 3/29/2023, she had an excisional biopsy of lipoma of right knee.      All pertinent labs dated 2023 and diagnotic tests reviewed and discussed with patient. Urinalysis: negative for leukocytes, WBC, and bacteria, but culture: less than 10,000 E Coli and 10,000-25,000 Group B strep. Likely contaminate, patient states she had to sit with cup by meatus for a while to make herself void. She denies dysuria, odor, burning with urination.    States she was instructed to stop lisinopril after VSG. On 3/16/2023 was her last dose of lisinopril. She presented a BP log that shows BP ranges from 99//90. BP-125/83 - at goal. Denies CP, SOB, HA, dizziness, or vision changes.     She is c/o left knee pain /10. She had a visit to [facility] ED on 3/26/2023. She denies injury, states she was getting up from a seated position and heard a "pop" and the pain was excruciating. She was prescribed diclofenac, baclofen, and medrol dose pack. She is able to extend and bend knee, but unable to move outward.    Continues to smoke 1 ppd. She has a desire to quit and was ordered nicotine patches in which she says is not working. Denies alcohol or illicit drug use. Denies chest pain, shortness of breath, cough, headache, dizziness, weakness, abdominal pain, nausea, vomiting, diarrhea, constipation, dysuria, depression, anxiety, SI, and HI.    Breast Cancer Screening - 2022: MMG-Negative. Continue annual screenings.  Cervical Cancer Screening- PAP " "2/25/2021 - ASCUS, repeat 1 yr. She was a "no show" on 9/1/2022. Scheduled: 4/28/2023.  LDCT - Declines.  Osteoporosis Screening - Deferred due to age  Colon Cancer Screening - No prior testing. Cologuard ordered, has not returned. Extensive conversation in regards to returning Cologuard ASAP.   Vaccines: Flu 12/16/2022 / Pneumonia 8/7/2018, 12/16/2022 / Tetanus 7/22/2019 / Shingles #1 - 4/17/2023       Review of Systems     Review of Systems   Constitutional: Negative.    HENT: Negative.     Eyes: Negative.    Respiratory: Negative.     Cardiovascular: Negative.    Gastrointestinal: Negative.    Endocrine: Negative.    Genitourinary: Negative.    Musculoskeletal:  Positive for arthralgias.   Skin: Negative.    Allergic/Immunologic: Negative.    Neurological: Negative.    Hematological: Negative.    Psychiatric/Behavioral: Negative.     All other systems reviewed and are negative.    Medical / Social / Family History     Past Medical History:   Diagnosis Date    Anxiety     Depression     Hyperlipidemia     Hypertension     UBALDO (obstructive sleep apnea)          Past Surgical History:   Procedure Laterality Date    CHOLECYSTECTOMY      gastric sleeve  03/01/2023    SURGICAL REMOVAL OF MASS OF LOWER EXTREMITY Right 3/29/2023    Procedure: EXCISION, MASS, LOWER EXTREMITY;  Surgeon: Cody Joseph MD;  Location: [facility];  Service: General;  Laterality: Right;  Right lower extremity/ anterior knee    TUBAL LIGATION           Social History  Lina Kat's  reports that she quit smoking about 4 months ago. Her smoking use included cigarettes. She has a 15.00 pack-year smoking history. She has never used smokeless tobacco. She reports that she does not drink alcohol and does not use drugs.    Family History  Lina Kat's family history includes Brain cancer in her maternal aunt and paternal uncle; Diabetes type II in her father; Heart attack in her father; Hypertension in her brother and mother.    Medications and " "Allergies     Medications  Current Outpatient Medications   Medication Instructions    albuterol (PROVENTIL/VENTOLIN HFA) 90 mcg/actuation inhaler 2 puffs, Inhalation, Every 6 hours PRN    fenofibrate 160 mg, Oral, Daily    oxybutynin (DITROPAN) 5 mg, Oral, 2 times daily    pantoprazole (PROTONIX) 40 mg, Oral, Daily    pravastatin (PRAVACHOL) 40 mg, Oral, Nightly    sertraline (ZOLOFT) 200 mg, Oral, Daily       Allergies  Review of patient's allergies indicates:   Allergen Reactions    Codeine Swelling       Physical Examination   Visit Vitals  /83 (BP Location: Right arm, Patient Position: Sitting, BP Method: Large (Automatic))   Pulse 74   Temp 97.7 °F (36.5 °C) (Oral)   Resp 20   Ht 5' 7" (1.702 m)   Wt 107.5 kg (236 lb 15.9 oz)   SpO2 96%   BMI 37.12 kg/m²     Physical Exam  Vitals reviewed.   Constitutional:       Appearance: Normal appearance. She is normal weight.   HENT:      Head: Normocephalic and atraumatic.      Right Ear: External ear normal.      Left Ear: External ear normal.      Nose: Nose normal.      Mouth/Throat:      Mouth: Mucous membranes are moist.      Pharynx: Oropharynx is clear.   Eyes:      Extraocular Movements: Extraocular movements intact.      Conjunctiva/sclera: Conjunctivae normal.      Pupils: Pupils are equal, round, and reactive to light.   Cardiovascular:      Rate and Rhythm: Normal rate and regular rhythm.      Pulses: Normal pulses.      Heart sounds: Normal heart sounds.   Pulmonary:      Effort: Pulmonary effort is normal.      Breath sounds: Normal breath sounds.   Abdominal:      General: Bowel sounds are normal.      Palpations: Abdomen is soft.   Musculoskeletal:      Cervical back: Normal range of motion and neck supple.      Left knee: Effusion present. Decreased range of motion. Tenderness present. No medial joint line tenderness.   Skin:     General: Skin is warm and dry.      Capillary Refill: Capillary refill takes less than 2 seconds.   Neurological:      " General: No focal deficit present.      Mental Status: She is alert and oriented to person, place, and time.   Psychiatric:         Mood and Affect: Mood normal.         Behavior: Behavior normal.         Thought Content: Thought content normal.         Judgment: Judgment normal.         Results     Lab Results   Component Value Date    WBC 6.6 04/14/2023    RBC 4.73 04/14/2023    HGB 14.3 04/14/2023    HCT 43.8 04/14/2023    MCV 92.6 04/14/2023    MCH 30.2 04/14/2023    MCHC 32.6 (L) 04/14/2023    RDW 13.2 04/14/2023     04/14/2023    MPV 10.7 (H) 04/14/2023      Lab Results   Component Value Date     04/14/2023    K 4.8 04/14/2023    CHLORIDE 108 (H) 04/14/2023    CO2 27 04/14/2023    GLUCOSE 104 (H) 04/14/2023    BUN 19.3 04/14/2023    CREATININE 1.00 04/14/2023    LABPROT 7.3 04/14/2023    ALBUMIN 4.1 04/14/2023    BILITOT 0.5 04/14/2023    ALKPHOS 61 04/14/2023    AST 26 04/14/2023    ALT 47 04/14/2023    EGFRNORACEVR >60 04/14/2023     Lab Results   Component Value Date    TSH 2.3244 08/17/2022     Lab Results   Component Value Date    CHOL 160 04/14/2023    HDL 45 04/14/2023    LDL 87.00 04/14/2023    TRIG 138 04/14/2023     Lab Results   Component Value Date    COLORUA Yellow 04/14/2023    SGUA 1.022 04/14/2023    PROTEINUA Negative 04/14/2023    GLUCOSEUA Normal 04/14/2023    BILIRUBINUA Negative 04/14/2023    BLOODUA Negative 04/14/2023    WBCUA 0-5 04/14/2023    RBCUA 0-5 04/14/2023    BACTERIA None Seen 04/14/2023    NITRITESUA Negative 04/14/2023    LEUKOCYTESUR Negative 04/14/2023    UROBILINOGEN Normal 04/14/2023     Lab Results   Component Value Date    CREATRANDUR 145.6 (H) 04/14/2023    MICALBCREAT <3.4 04/14/2023     Lab Results   Component Value Date    SHXIKFOI85DL 41.7 12/12/2022     Lab Results   Component Value Date    HIV Nonreactive 04/14/2023    HEPAIGM Nonreactive 04/14/2023    HEPBCOREM Nonreactive 04/14/2023    HEPCAB Nonreactive 04/14/2023       Assessment and Plan  (including Health Maintenance)     1. Primary hypertension  - Microalbumin/Creatinine Ratio, Urine; Future  - Comprehensive Metabolic Panel; Future  - CBC Auto Differential; Future  BP Readings from Last 3 Encounters:   04/17/23 125/83   04/11/23 (!) 124/90   03/29/23 131/87      At goal.  Follow a low sodium (less than 2 grams of sodium per day), DASH diet.   Has not taken Lisinopril since 3/16/2023 - ok to d/c.   Monitor blood pressure and report any consistent values greater than 140/90 and keep a log.  Encouraged continued smoking cessation to aid in BP reduction and co-morbidities.   Maintain healthy weight with a BMI goal of <30.   Aerobic exercise for 150 minutes per week (or 5 days a week for 30 minutes each day).     2. UBALDO (obstructive sleep apnea)  Reports not sleeping with CPAP since VSG.   Reports spouse states she is no longer snoring at night.    3. Acute pain of left knee  - Ambulatory referral/consult to Orthopedics; Future  Perform knee exercises daily.   Avoid activities than increase knee pain or stiffness.   Apply heating pad, ice pack, and BioFreeze/topical capsaicin as needed; alternate every 15-20 minutes.   Continue tylenol arthritis and diclofenac as needed.     4. Recurrent major depressive disorder, in full remission  - sertraline (ZOLOFT) 100 MG tablet; Take 2 tablets (200 mg total) by mouth once daily.  Dispense: 180 tablet; Refill: 1  Continue sertraline - refilled today.  Read positive daily meditations, avoid negative media, set healthy boundaries.   Exercise daily, keep consistent sleep pattern, eat a healthy diet.   Establish good social support, make changes to reduce stress.   Do not drink alcohol or use illicit drugs.   Reports any symptoms of suicidal/homicidal ideations or self harm immediately, go to nearest emergency room.      5. Hypertriglyceridemia  - pravastatin (PRAVACHOL) 40 MG tablet; Take 1 tablet (40 mg total) by mouth every evening.  Dispense: 90 tablet; Refill:  1  - fenofibrate 160 MG Tab; Take 1 tablet (160 mg total) by mouth once daily.  Dispense: 90 tablet; Refill: 1  - Lipid Panel; Future  Lab Results   Component Value Date    LDL 87.00 04/14/2023       Lab Results   Component Value Date    TRIG 138 04/14/2023       Lab Results   Component Value Date    HDL 45 04/14/2023        Lab Results   Component Value Date    CHOL 160 04/14/2023      Continue pravastatin and fenobibrate as prescribed.   Will consider discontinuing if lipid panel is controlled with diet.  Follow a low cholesterol, low saturated fat diet with less than 200 mg of cholesterol a day.   Avoid fried foods and high saturated fats.  Add flax seed or fish oil supplements to diet.   Increase dietary fiber.   Regular exercise improves cholesterol levels.  Physical activity 5 times a week for 30 minutes per day (or 150 minutes per week).   Stressed importance of dietary modifications.     6. OAB (overactive bladder)  - oxybutynin (DITROPAN) 5 MG Tab; Take 1 tablet (5 mg total) by mouth 2 (two) times daily.  Dispense: 180 tablet; Refill: 1  - Urinalysis; Future  - Urine culture; Future    7. Immunization due  - Zoster Recombinant Vaccine  Written and verbal consent obtained.  Shingles #1 vaccine was administered.  Ok to take acetaminophen for soreness of arm.     8. BMI 37.0-37.9, adult  - Hemoglobin A1C; Future  Body mass index is 37.12 kg/m².  Goal BMI <30.  Aerobic exercise 150 minutes per week.  Avoid soda, simple sugars, sweets, excessive rice, pasta, potatoes or bread.   Choose brown options when available and portion control.  Limit fast foods and fried foods.   Choose complex carbs in moderation (ex: green, leafy vegetables, beans, oatmeal).  Eat plenty of fresh fruits and vegetables with lean meats daily.   Consider permanent healthy lifestyle changes.     9. Tobacco user  - Vitamin D; Future  - TSH; Future  - T4, Free; Future  Smoking cessation discussed for 3 minutes.   Quit smoking 4 days  ago.  Discussed benefits of quitting including improved health, decreased cardiac/vascular/pulmonary/stroke risks as well as saving money.     10. Screening for iron deficiency anemia  - Iron and TIBC; Future  - Ferritin; Future      Health Maintenance Due   Topic Date Due    Colorectal Cancer Screening  Never done     Tests to Keep You Healthy    Mammogram: Met on 8/17/2022  Colon Cancer Screening: ORDERED  Cervical Cancer Screening: Met on 2/25/2021  Last Blood Pressure <= 139/89 (4/17/2023): Yes  Tobacco Cessation: Yes      Health Maintenance Topics with due status: Not Due       Topic Last Completion Date    TETANUS VACCINE 07/22/2019    Cervical Cancer Screening 02/25/2021    Mammogram 08/17/2022    Hemoglobin A1c (Prediabetes) 04/14/2023    Lipid Panel 04/14/2023    Shingles Vaccine 04/17/2023       Future Appointments   Date Time Provider Department Center   4/28/2023 11:00 AM JAYCEE Augillon [facility]   8/17/2023  8:15 AM RAMIRO Rodgers [facility]        Follow up in about 4 months (around 8/17/2023) for F2F, Follow up, Med check, Lab review.  RTC PRN.  Labs within a week of visit.        Signature:        RAMIRO Rodgers  [facility]  [address] [facility] [facility] [id number]    Date of encounter: 4/17/23

## 2023-04-22 ENCOUNTER — HOSPITAL ENCOUNTER (EMERGENCY)
Facility: HOSPITAL | Age: 48
Discharge: LEFT BEFORE DISCHARGE | End: 2023-04-23
Payer: MEDICAID

## 2023-04-22 VITALS
TEMPERATURE: 98 F | WEIGHT: 231 LBS | BODY MASS INDEX: 36.18 KG/M2 | HEART RATE: 75 BPM | OXYGEN SATURATION: 95 % | SYSTOLIC BLOOD PRESSURE: 142 MMHG | DIASTOLIC BLOOD PRESSURE: 97 MMHG | RESPIRATION RATE: 18 BRPM

## 2023-04-22 DIAGNOSIS — M25.562 LEFT KNEE PAIN: ICD-10-CM

## 2023-04-22 PROCEDURE — 99283 EMERGENCY DEPT VISIT LOW MDM: CPT | Mod: 25

## 2023-04-22 NOTE — FIRST PROVIDER EVALUATION
Medical screening examination initiated.  I have conducted a focused provider triage encounter, findings are as follows:    Brief history of present illness:  48 year old female presents to ER with c/o left knee pain. Patient states that she injured her knee sometime in march. She was seen in ER and had negative XR. She states that she was told she possible has ligamentous injury. States that her appointment with ortho is not until June    Vitals:    04/22/23 1836   BP: (!) 142/97   Pulse: 75   Resp: 18   Temp: 98.3 °F (36.8 °C)   TempSrc: Oral   SpO2: 95%   Weight: 104.8 kg (231 lb)       Pertinent physical exam:  awake and alert, nad    Brief workup plan:  imaging, meds     Preliminary workup initiated; this workup will be continued and followed by the physician or advanced practice provider that is assigned to the patient when roomed.

## 2023-04-28 ENCOUNTER — OFFICE VISIT (OUTPATIENT)
Dept: GYNECOLOGY | Facility: CLINIC | Age: 48
End: 2023-04-28
Payer: MEDICAID

## 2023-04-28 VITALS
OXYGEN SATURATION: 100 % | SYSTOLIC BLOOD PRESSURE: 114 MMHG | TEMPERATURE: 98 F | WEIGHT: 233.19 LBS | HEIGHT: 67 IN | HEART RATE: 79 BPM | RESPIRATION RATE: 20 BRPM | DIASTOLIC BLOOD PRESSURE: 81 MMHG | BODY MASS INDEX: 36.6 KG/M2

## 2023-04-28 DIAGNOSIS — Z12.4 PAP SMEAR FOR CERVICAL CANCER SCREENING: Primary | ICD-10-CM

## 2023-04-28 DIAGNOSIS — Z12.31 VISIT FOR SCREENING MAMMOGRAM: ICD-10-CM

## 2023-04-28 DIAGNOSIS — E66.9 OBESITY, UNSPECIFIED CLASSIFICATION, UNSPECIFIED OBESITY TYPE, UNSPECIFIED WHETHER SERIOUS COMORBIDITY PRESENT: ICD-10-CM

## 2023-04-28 PROCEDURE — 1160F PR REVIEW ALL MEDS BY PRESCRIBER/CLIN PHARMACIST DOCUMENTED: ICD-10-PCS | Mod: CPTII,,, | Performed by: NURSE PRACTITIONER

## 2023-04-28 PROCEDURE — 3008F PR BODY MASS INDEX (BMI) DOCUMENTED: ICD-10-PCS | Mod: CPTII,,, | Performed by: NURSE PRACTITIONER

## 2023-04-28 PROCEDURE — 3079F DIAST BP 80-89 MM HG: CPT | Mod: CPTII,,, | Performed by: NURSE PRACTITIONER

## 2023-04-28 PROCEDURE — 3066F NEPHROPATHY DOC TX: CPT | Mod: CPTII,,, | Performed by: NURSE PRACTITIONER

## 2023-04-28 PROCEDURE — 3008F BODY MASS INDEX DOCD: CPT | Mod: CPTII,,, | Performed by: NURSE PRACTITIONER

## 2023-04-28 PROCEDURE — 3061F PR NEG MICROALBUMINURIA RESULT DOCUMENTED/REVIEW: ICD-10-PCS | Mod: CPTII,,, | Performed by: NURSE PRACTITIONER

## 2023-04-28 PROCEDURE — 3079F PR MOST RECENT DIASTOLIC BLOOD PRESSURE 80-89 MM HG: ICD-10-PCS | Mod: CPTII,,, | Performed by: NURSE PRACTITIONER

## 2023-04-28 PROCEDURE — 99214 OFFICE O/P EST MOD 30 MIN: CPT | Mod: PBBFAC | Performed by: NURSE PRACTITIONER

## 2023-04-28 PROCEDURE — 1160F RVW MEDS BY RX/DR IN RCRD: CPT | Mod: CPTII,,, | Performed by: NURSE PRACTITIONER

## 2023-04-28 PROCEDURE — 99396 PR PREVENTIVE VISIT,EST,40-64: ICD-10-PCS | Mod: S$PBB,,, | Performed by: NURSE PRACTITIONER

## 2023-04-28 PROCEDURE — 3066F PR DOCUMENTATION OF TREATMENT FOR NEPHROPATHY: ICD-10-PCS | Mod: CPTII,,, | Performed by: NURSE PRACTITIONER

## 2023-04-28 PROCEDURE — 88174 CYTOPATH C/V AUTO IN FLUID: CPT | Performed by: NURSE PRACTITIONER

## 2023-04-28 PROCEDURE — 1159F PR MEDICATION LIST DOCUMENTED IN MEDICAL RECORD: ICD-10-PCS | Mod: CPTII,,, | Performed by: NURSE PRACTITIONER

## 2023-04-28 PROCEDURE — 99396 PREV VISIT EST AGE 40-64: CPT | Mod: S$PBB,,, | Performed by: NURSE PRACTITIONER

## 2023-04-28 PROCEDURE — 3074F PR MOST RECENT SYSTOLIC BLOOD PRESSURE < 130 MM HG: ICD-10-PCS | Mod: CPTII,,, | Performed by: NURSE PRACTITIONER

## 2023-04-28 PROCEDURE — 3061F NEG MICROALBUMINURIA REV: CPT | Mod: CPTII,,, | Performed by: NURSE PRACTITIONER

## 2023-04-28 PROCEDURE — 87624 HPV HI-RISK TYP POOLED RSLT: CPT

## 2023-04-28 PROCEDURE — 3074F SYST BP LT 130 MM HG: CPT | Mod: CPTII,,, | Performed by: NURSE PRACTITIONER

## 2023-04-28 PROCEDURE — 1159F MED LIST DOCD IN RCRD: CPT | Mod: CPTII,,, | Performed by: NURSE PRACTITIONER

## 2023-04-28 NOTE — PROGRESS NOTES
"  Subjective:       Patient ID: Lina Kat is a 48 y.o. female.    Chief Complaint:  Gynecologic Exam      History of Present Illness  The patient is  here for annual exam. Pt is postmenopausal since age 43. Denies history of abnormal paps. Last pap -ASCUS and HPV neg. Last MMG was 22-BIRADS 1.  Denies breast or urinary complaints. Denies pelvic pain, abnormal bleeding or discharge. Pt reports no STIs in the past and no concerns. Admits tobacco use. Dep. screening 0. Denies fly hx of ovarian, uterine or colon cancer. Admits breast cancer in [facility]. Cologuard ordered per PCP. No GYN complaints today.    GYN & OB History  No LMP recorded. Patient is postmenopausal.   Date of Last Pap: 2021    Review of patient's allergies indicates:   Allergen Reactions    Codeine Swelling     Past Medical History:   Diagnosis Date    Anxiety     Depression     Hyperlipidemia     Hypertension     UBALDO (obstructive sleep apnea)      OB History    Para Term  AB Living   1 1           SAB IAB Ectopic Multiple Live Births                  # Outcome Date GA Lbr Serjio/2nd Weight Sex Delivery Anes PTL Lv   1 Para                 Review of Systems  Review of Systems    Negative except for pertinent findings for positives per HPI     Objective:    Physical Exam    /81 (BP Location: Left arm, Patient Position: Sitting, BP Method: Medium (Automatic))   Pulse 79   Temp 97.9 °F (36.6 °C) (Oral)   Resp 20   Ht 5' 7" (1.702 m)   Wt 105.8 kg (233 lb 3.2 oz)   SpO2 100%   BMI 36.52 kg/m²   GENERAL: Well-developed female in no acute distress.  SKIN: Normal to inspection, warm and intact.  BREASTS: No masses, lumps, discharge, tenderness.  VULVA: General appearance normal; external genitalia with no lesions or erythema.  VAGINA: Mucosa atrophic, no abnormal discharge or lesions.  CERVIX: Atrophic, no abnormal discharge.  BIMANUAL EXAM: Limited exam d/t body habitus. The uterus is non tender. Diego adnexa reveal no " tenderness.  PSYCHIATRIC: Patient is oriented to person, place, and time. Mood and affect are normal.    Assessment:       1. Pap smear for cervical cancer screening    2. Visit for screening mammogram    3. Obesity, unspecified classification, unspecified obesity type, unspecified whether serious comorbidity present       Plan:   Lina was seen today for gynecologic exam.    Diagnoses and all orders for this visit:    Pap smear for cervical cancer screening  -     Liquid-Based Pap Smear, Screening Screening    Visit for screening mammogram  -     Mammo Digital Screening Bilat w/ Gareth; Future    Obesity, unspecified classification, unspecified obesity type, unspecified whether serious comorbidity present    Pap today  MG ordered  Healthy lifestyle choices. Importance of maintaining a healthy BMI. Healthy diet including limiting fast foods, processed foods, foods containing high amounts of saturated fats or high sodium content. Recommend low carb, low fat diet rich in lean meat and fish, non starchy vegetables, fruits and good fat while maintaining portion control. Limit sugar intake. Recommended plenty of water, avoiding carbonated beverages and high fructose corn syrup. Regular cardiovascular exercise, at least 150 minutes of cardiovascular exercise (at least 30 mins 5x/week).  Follow up in about 1 year (around 4/28/2024) for annual exam.

## 2023-05-02 LAB — PSYCHE PATHOLOGY RESULT: NORMAL

## 2023-05-17 ENCOUNTER — PATIENT MESSAGE (OUTPATIENT)
Dept: INTERNAL MEDICINE | Facility: CLINIC | Age: 48
End: 2023-05-17
Payer: MEDICAID

## 2023-05-18 ENCOUNTER — PATIENT MESSAGE (OUTPATIENT)
Dept: INTERNAL MEDICINE | Facility: CLINIC | Age: 48
End: 2023-05-18
Payer: MEDICAID

## 2023-05-19 RX ORDER — PANTOPRAZOLE SODIUM 40 MG/1
40 TABLET, DELAYED RELEASE ORAL DAILY
Qty: 90 TABLET | Refills: 0 | Status: SHIPPED | OUTPATIENT
Start: 2023-05-19 | End: 2023-08-17 | Stop reason: SDUPTHER

## 2023-06-13 ENCOUNTER — OFFICE VISIT (OUTPATIENT)
Dept: ORTHOPEDICS | Facility: CLINIC | Age: 48
End: 2023-06-13
Payer: MEDICAID

## 2023-06-13 VITALS — WEIGHT: 221.81 LBS | HEIGHT: 67 IN | BODY MASS INDEX: 34.81 KG/M2

## 2023-06-13 DIAGNOSIS — M17.12 PRIMARY OSTEOARTHRITIS OF LEFT KNEE: Primary | ICD-10-CM

## 2023-06-13 PROCEDURE — 1159F MED LIST DOCD IN RCRD: CPT | Mod: CPTII,,, | Performed by: NURSE PRACTITIONER

## 2023-06-13 PROCEDURE — 3066F NEPHROPATHY DOC TX: CPT | Mod: CPTII,,, | Performed by: NURSE PRACTITIONER

## 2023-06-13 PROCEDURE — 1159F PR MEDICATION LIST DOCUMENTED IN MEDICAL RECORD: ICD-10-PCS | Mod: CPTII,,, | Performed by: NURSE PRACTITIONER

## 2023-06-13 PROCEDURE — 3061F NEG MICROALBUMINURIA REV: CPT | Mod: CPTII,,, | Performed by: NURSE PRACTITIONER

## 2023-06-13 PROCEDURE — 20610 DRAIN/INJ JOINT/BURSA W/O US: CPT | Mod: PBBFAC,LT | Performed by: NURSE PRACTITIONER

## 2023-06-13 PROCEDURE — 1160F RVW MEDS BY RX/DR IN RCRD: CPT | Mod: CPTII,,, | Performed by: NURSE PRACTITIONER

## 2023-06-13 PROCEDURE — 1160F PR REVIEW ALL MEDS BY PRESCRIBER/CLIN PHARMACIST DOCUMENTED: ICD-10-PCS | Mod: CPTII,,, | Performed by: NURSE PRACTITIONER

## 2023-06-13 PROCEDURE — 3008F BODY MASS INDEX DOCD: CPT | Mod: CPTII,,, | Performed by: NURSE PRACTITIONER

## 2023-06-13 PROCEDURE — 99214 OFFICE O/P EST MOD 30 MIN: CPT | Mod: PBBFAC,25 | Performed by: NURSE PRACTITIONER

## 2023-06-13 PROCEDURE — 3061F PR NEG MICROALBUMINURIA RESULT DOCUMENTED/REVIEW: ICD-10-PCS | Mod: CPTII,,, | Performed by: NURSE PRACTITIONER

## 2023-06-13 PROCEDURE — 20610 LARGE JOINT ASPIRATION/INJECTION: L KNEE: ICD-10-PCS | Mod: S$PBB,LT,, | Performed by: NURSE PRACTITIONER

## 2023-06-13 PROCEDURE — 3008F PR BODY MASS INDEX (BMI) DOCUMENTED: ICD-10-PCS | Mod: CPTII,,, | Performed by: NURSE PRACTITIONER

## 2023-06-13 PROCEDURE — 3066F PR DOCUMENTATION OF TREATMENT FOR NEPHROPATHY: ICD-10-PCS | Mod: CPTII,,, | Performed by: NURSE PRACTITIONER

## 2023-06-13 PROCEDURE — 99215 OFFICE O/P EST HI 40 MIN: CPT | Mod: 25,S$PBB,, | Performed by: NURSE PRACTITIONER

## 2023-06-13 PROCEDURE — 99215 PR OFFICE/OUTPT VISIT, EST, LEVL V, 40-54 MIN: ICD-10-PCS | Mod: 25,S$PBB,, | Performed by: NURSE PRACTITIONER

## 2023-06-13 RX ORDER — LIDOCAINE HYDROCHLORIDE 10 MG/ML
5 INJECTION, SOLUTION EPIDURAL; INFILTRATION; INTRACAUDAL; PERINEURAL
Status: COMPLETED | OUTPATIENT
Start: 2023-06-13 | End: 2023-06-13

## 2023-06-13 RX ORDER — DICLOFENAC SODIUM 10 MG/G
4 GEL TOPICAL 3 TIMES DAILY
Qty: 100 G | Refills: 2 | Status: SHIPPED | OUTPATIENT
Start: 2023-06-13 | End: 2023-08-17 | Stop reason: SDUPTHER

## 2023-06-13 RX ORDER — TRIAMCINOLONE ACETONIDE 40 MG/ML
40 INJECTION, SUSPENSION INTRA-ARTICULAR; INTRAMUSCULAR
Status: COMPLETED | OUTPATIENT
Start: 2023-06-13 | End: 2023-06-13

## 2023-06-13 RX ADMIN — TRIAMCINOLONE ACETONIDE 40 MG: 40 INJECTION, SUSPENSION INTRA-ARTICULAR; INTRAMUSCULAR at 07:06

## 2023-06-13 RX ADMIN — LIDOCAINE HYDROCHLORIDE 5 ML: 10 INJECTION, SOLUTION EPIDURAL; INFILTRATION; INTRACAUDAL; PERINEURAL at 07:06

## 2023-06-13 NOTE — PROGRESS NOTES
Subjective:   PATIENT ID: Lina Kat is a 48 y.o. female. Smoker. Employment HX: office work, currently unemployed.    Seen [facility] ortho for same DX since n/a.   CHIEF COMPLAINT: Pain of the Left Knee    HPI:    Left aching medial knee pain.   Injury: no known injury  Onset: several months ago fluctuates   Modifying Factors: worse with activity, improves with rest, stiffness after immobilization, and improves with less than 30 minutes activity  Associated Symptoms: crepitus and decreased ROM   Activity: sedentary with light activity and pain moderately interferes with ADLs   Previous Treatments:  RX NSAIDs without symptom relief  PMH: negative for contraindications for NSAID use  Family History: + OA    NOTE: New patient  referred for left knee pain with limited conservative treatments.  History of lipoma removal in right knee years ago.  Patient also reports bariatric surgery in March 2023 with surgeon encouraging walking to reduce BMI post-op but patient having difficulty accomplishing this with left knee pain.     Current Outpatient Medications:     albuterol (PROVENTIL/VENTOLIN HFA) 90 mcg/actuation inhaler, Inhale 2 puffs into the lungs every 6 (six) hours as needed for Wheezing or Shortness of Breath., Disp: 18 g, Rfl: 1    fenofibrate 160 MG Tab, Take 1 tablet (160 mg total) by mouth once daily., Disp: 90 tablet, Rfl: 1    oxybutynin (DITROPAN) 5 MG Tab, Take 1 tablet (5 mg total) by mouth 2 (two) times daily., Disp: 180 tablet, Rfl: 1    pantoprazole (PROTONIX) 40 MG tablet, Take 1 tablet (40 mg total) by mouth Daily., Disp: 90 tablet, Rfl: 0    pravastatin (PRAVACHOL) 40 MG tablet, Take 1 tablet (40 mg total) by mouth every evening., Disp: 90 tablet, Rfl: 1    sertraline (ZOLOFT) 100 MG tablet, Take 2 tablets (200 mg total) by mouth once daily., Disp: 180 tablet, Rfl: 1  Review of patient's allergies indicates:   Allergen Reactions    Codeine Swelling     Hemoglobin A1c   Date Value Ref Range Status  "  04/14/2023 5.2 <=7.0 % Final   05/24/2022 5.6 <=7.0 % Final   11/30/2020 5.5 <<=7.0 % Final      Body mass index is 34.74 kg/m².   Vitals:    06/13/23 0754   Weight: 100.6 kg (221 lb 12.8 oz)   Height: 5' 7" (1.702 m)   PainSc:   5      REVIEW OF SYSTEMS:  A ten-point review of systems was performed and is negative, except as mentioned above   Objective:   MSK Bilateral Knee  General:  No apparent distress, no pain indicators, morbid obesity   Inspection: limping gait, mild effusion, full weight bearing, no erythema, no contusion, mild quad deconditioning, varus deformity   Palpation: LEFT knee tenderness with palpation at medial joint line, peripatellar soft tissue , non-tender: patellar tendon, tibial plateau  ROM:    Active Flexion / Extension (0-140):  0 / 135 non-painful (R)  0 / 101 painful (L)  Strength:   Flexion     5 / 5 (R)  4 / 5 (L)  Extension     5 / 5 (R)  4 / 5 (L)  Special Testing:  IT Band Testing  Heide's Test:  -- (R)  -- (L)  Effusion Testing  Ballotable Effusion:  -- (R)  -- (L)  Fluid Wave:    -- (R)  -- (L)  Patellar Testing  Patellar Grind:    -- (R)  + (L)  Patellar Facet Pain:  -- (R)  + (L)  Apprehension:    -- (R)  -- (L)  Meniscal Testing  Afshan's test:    -- (R)  + (L)  Thessaly's Test:    Not tested (R)  not tested (L)  Ligament Testing  ACL Anterior Drawer:   -- (R) -- (L)  PCL Posterior Drawer:   -- (R) -- (L)  LCL Varus Test:    -- (R) -- (L)  MCL Valgus Test:    -- (R) -- (L)  Hip Exam normal  Ankle Exam normal  Neurovascular: Intact to light touch  Neuro/ Psych: Awake, alert, oriented, normal mood and affect  Lymphatic: No LAD  Skin/ Soft Tissue: no rash, skin intact  Assessment:   IMAGING: X-ray 4 views of left knee dated 4/22/23 reviewed and independently interpreted by me.  Discussed with patient. Noted no acute, DJD noted.    XR KNEE COMP 4 OR MORE VIEWS LEFT 4/22/23  CLINICAL HISTORY:  Left knee pain.  TECHNIQUE:  Four views of the left " knee.  COMPARISON:  03/26/2023  FINDINGS:  No acute displaced fracture, subluxation, or dislocation is identified.    There is no joint effusion. No radiopaque foreign bodies identified. No significant soft tissue swelling is identified.  Impression:  1. No acute osseous abnormality is identified.    EMR REVIEW: completed with noted Referral documentation reviewed    DIAGNOSIS:  1. Primary osteoarthritis of left knee    2. BMI 34.0-34.9,adult       Plan:      Ongoing education about DX and treatment recommendations including conservative treatments of daily HEP with TheraBand, BMI reduction goal 5-10% of body weight (180# overall goal), muscle strengthening with use of stationary bike (RPM set at 80 or > with slow progression to goal of 40 minutes 3-4 times per week as tolerated), adequate vit D/C, glucosamine 1500 mg/day and daily acetaminophen 1000 mg 3 times/ day if able to tolerate.    Treatment plan: Moderate exacerbation of chronic Left  mild knee arthritis complicated by obesity  Recommend starting initial conservative treatments including: RX topical NSAID , HEP with TheraBand > 6 weeks, formal RX PT, and CSI.  If inadequate at f/u will consider VS injections.  CSI given due to patient recent bariatric surgery requiring increased exercise activity to improve BMI and with knee pain patient unable to accomplish this. Will also RX PT to improve stiffness and function.   Procedure: CSI v. VS injections discussed, s/t failed conservative treatments patient consents to CSI today  RX Medications: continue medications as RX per PCP; RX topical diclofenac as directed, medication precautions given.   RTC: 4 months  NOTE: None    Vanessa Eugene FNP  Procedure Note:   Large Joint Aspiration/Injection: L knee    Date/Time: 6/13/2023 7:40 AM  Performed by: Vanessa Eugene NP  Authorized by: Vanessa Eugene NP     Location:  Knee  Site:  L knee  Medications:  5 mL LIDOCAINE 1% (PF) 50 mg / 5 mL; 40 mg  KENALOG 40 mg / 1 mL     Ortho Procedure Note   Procedure: LEFT Knee CSI lateral suprapatellar approach     Indications: Therapeutic Indication - Decrease pain, Increase range of motion and improve quality of life:   Risks:  Possible complications with the injection include bleeding, infection (.01%), tendon rupture, steroid flare, fat pad or soft tissue atrophy, skin depigmentation, allergic reaction to medications and vasovagal response. (steroid flare treatment is rest, ice, NSAIDs and resolves in 24-36 hours)  Consent:  Procedure, risks, benefits, and alternatives explained the patient, who voiced understanding and agreed to proceed with procedure.  Consent signed and scanned into the medical record.   No absolute contraindications (cellulitis overlying joint, infection, lack of informed consent, allergy to injection medication, AVN protein or egg allergy for sodium hyaluronate, or history of steroid flare) or relative contraindications (uncontrolled DM2 A1c>10, coagulopathy, INR > 3.5, previous joint replacement or history of AVN).        Staff: Vanessa Eugene FNDORETHA  Description:  Time-out performed.  The patient was prepped in normal sterile fashion use of chlorhexidine scrub and the appropriate and anatomic landmarks were identified.  Sterile 21 gauge 1.5 inch  was used. Topical ethyl chloride was applied. Contents of syringe included:     LEFT KNEE: 5 ml of 1% of lidocaine (PF) with 40 mg of Kenalog      Drainage: n/a   Complications: None   EBL: None   Post Procedure: Patient alert, and moving all extremities. ROM improved, pain decreased.  Good peripheral pulses, no signs of vascular compromise and range of motion intact.  Aftercare instructions were given to patient at time of discharge.  Relative rest for 3 days-avoiding excess activity.  Place ice on the area for 15 minutes every 4-6 hours. Patient may take Tylenol a 1000 mg b.i.d. or ibuprofen 600 mg t.i.d. for the next 3-4 days if not on medication  already and safe to take pending co-morbidities.  Protect the area for the next 1-8 hours if anesthetic was used.  Avoid excessive activity for the next 3-4 weeks.  ER precautions given for fever, severe joint pain or allergic reaction or other new symptoms related to the joint injection.         Timed Billing Note  Total Time Spent with Patient: 40 minutes Excludes Time Spent Performing Procedure  Visit Start Time: 0810  10 minutes spent prior to visit reviewing EMR, prior labs and x-rays.  20 minutes spent in visit with patient face-to-face time completing exam, obtaining history, educating on DX and treatment plan.  10 minutes spent after visit completing EMR documentation.   Visit End Time: 0850

## 2023-06-22 DIAGNOSIS — R06.2 WHEEZING: ICD-10-CM

## 2023-06-28 NOTE — TELEPHONE ENCOUNTER
Please see the attached refill request. LOV on 4/17/23, next appt. 8/17/23. Start date on 3/2023 with 1 refill, please review.

## 2023-06-29 RX ORDER — ALBUTEROL SULFATE 90 UG/1
2 AEROSOL, METERED RESPIRATORY (INHALATION) EVERY 6 HOURS PRN
Qty: 18 G | Refills: 1 | Status: SHIPPED | OUTPATIENT
Start: 2023-06-29 | End: 2023-08-17 | Stop reason: SDUPTHER

## 2023-07-06 ENCOUNTER — HOSPITAL ENCOUNTER (EMERGENCY)
Facility: HOSPITAL | Age: 48
Discharge: HOME | End: 2023-07-06
Attending: EMERGENCY MEDICINE
Payer: MEDICAID

## 2023-07-06 VITALS
TEMPERATURE: 98 F | DIASTOLIC BLOOD PRESSURE: 89 MMHG | RESPIRATION RATE: 10 BRPM | BODY MASS INDEX: 34.55 KG/M2 | OXYGEN SATURATION: 96 % | SYSTOLIC BLOOD PRESSURE: 135 MMHG | HEIGHT: 66 IN | HEART RATE: 62 BPM | WEIGHT: 215 LBS

## 2023-07-06 DIAGNOSIS — Z72.0 TOBACCO ABUSE: ICD-10-CM

## 2023-07-06 DIAGNOSIS — I10 BENIGN ESSENTIAL HTN: ICD-10-CM

## 2023-07-06 DIAGNOSIS — R07.89 ATYPICAL CHEST PAIN: Primary | ICD-10-CM

## 2023-07-06 DIAGNOSIS — R07.9 CHEST PAIN: ICD-10-CM

## 2023-07-06 LAB
ALBUMIN SERPL-MCNC: 4.2 G/DL (ref 3.5–5)
ALBUMIN/GLOB SERPL: 1.4 RATIO (ref 1.1–2)
ALP SERPL-CCNC: 58 UNIT/L (ref 40–150)
ALT SERPL-CCNC: 31 UNIT/L (ref 0–55)
AST SERPL-CCNC: 23 UNIT/L (ref 5–34)
BASOPHILS # BLD AUTO: 0.02 X10(3)/MCL
BASOPHILS NFR BLD AUTO: 0.3 %
BILIRUBIN DIRECT+TOT PNL SERPL-MCNC: 0.5 MG/DL
BNP BLD-MCNC: 52.8 PG/ML
BUN SERPL-MCNC: 23.4 MG/DL (ref 9.8–20.1)
CALCIUM SERPL-MCNC: 10.6 MG/DL (ref 8.4–10.2)
CHLORIDE SERPL-SCNC: 107 MMOL/L (ref 98–107)
CO2 SERPL-SCNC: 20 MMOL/L (ref 22–29)
CREAT SERPL-MCNC: 0.91 MG/DL (ref 0.55–1.02)
EOSINOPHIL # BLD AUTO: 0.08 X10(3)/MCL (ref 0–0.9)
EOSINOPHIL NFR BLD AUTO: 1 %
ERYTHROCYTE [DISTWIDTH] IN BLOOD BY AUTOMATED COUNT: 13.6 % (ref 11.5–17)
GFR SERPLBLD CREATININE-BSD FMLA CKD-EPI: >60 MLS/MIN/1.73/M2
GLOBULIN SER-MCNC: 2.9 GM/DL (ref 2.4–3.5)
GLUCOSE SERPL-MCNC: 94 MG/DL (ref 74–100)
HCT VFR BLD AUTO: 43 % (ref 37–47)
HGB BLD-MCNC: 14.1 G/DL (ref 12–16)
IMM GRANULOCYTES # BLD AUTO: 0.03 X10(3)/MCL (ref 0–0.04)
IMM GRANULOCYTES NFR BLD AUTO: 0.4 %
LIPASE SERPL-CCNC: 11 U/L
LYMPHOCYTES # BLD AUTO: 2.72 X10(3)/MCL (ref 0.6–4.6)
LYMPHOCYTES NFR BLD AUTO: 34.1 %
MCH RBC QN AUTO: 30.3 PG (ref 27–31)
MCHC RBC AUTO-ENTMCNC: 32.8 G/DL (ref 33–36)
MCV RBC AUTO: 92.3 FL (ref 80–94)
MONOCYTES # BLD AUTO: 0.52 X10(3)/MCL (ref 0.1–1.3)
MONOCYTES NFR BLD AUTO: 6.5 %
NEUTROPHILS # BLD AUTO: 4.6 X10(3)/MCL (ref 2.1–9.2)
NEUTROPHILS NFR BLD AUTO: 57.7 %
NRBC BLD AUTO-RTO: 0 %
PLATELET # BLD AUTO: 165 X10(3)/MCL (ref 130–400)
PMV BLD AUTO: 11.5 FL (ref 7.4–10.4)
POTASSIUM SERPL-SCNC: 4.2 MMOL/L (ref 3.5–5.1)
PROT SERPL-MCNC: 7.1 GM/DL (ref 6.4–8.3)
RBC # BLD AUTO: 4.66 X10(6)/MCL (ref 4.2–5.4)
SODIUM SERPL-SCNC: 138 MMOL/L (ref 136–145)
TROPONIN I SERPL-MCNC: <0.01 NG/ML (ref 0–0.04)
TROPONIN I SERPL-MCNC: <0.01 NG/ML (ref 0–0.04)
WBC # SPEC AUTO: 7.97 X10(3)/MCL (ref 4.5–11.5)

## 2023-07-06 PROCEDURE — 83690 ASSAY OF LIPASE: CPT | Performed by: NURSE PRACTITIONER

## 2023-07-06 PROCEDURE — 84484 ASSAY OF TROPONIN QUANT: CPT | Performed by: NURSE PRACTITIONER

## 2023-07-06 PROCEDURE — 93010 ELECTROCARDIOGRAM REPORT: CPT | Mod: ,,, | Performed by: INTERNAL MEDICINE

## 2023-07-06 PROCEDURE — 93005 ELECTROCARDIOGRAM TRACING: CPT

## 2023-07-06 PROCEDURE — 83880 ASSAY OF NATRIURETIC PEPTIDE: CPT | Performed by: NURSE PRACTITIONER

## 2023-07-06 PROCEDURE — 99285 EMERGENCY DEPT VISIT HI MDM: CPT | Mod: 25

## 2023-07-06 PROCEDURE — 93010 EKG 12-LEAD: ICD-10-PCS | Mod: ,,, | Performed by: INTERNAL MEDICINE

## 2023-07-06 PROCEDURE — 25000003 PHARM REV CODE 250: Performed by: EMERGENCY MEDICINE

## 2023-07-06 PROCEDURE — 85025 COMPLETE CBC W/AUTO DIFF WBC: CPT | Performed by: NURSE PRACTITIONER

## 2023-07-06 PROCEDURE — 84484 ASSAY OF TROPONIN QUANT: CPT | Performed by: EMERGENCY MEDICINE

## 2023-07-06 PROCEDURE — 80053 COMPREHEN METABOLIC PANEL: CPT | Performed by: NURSE PRACTITIONER

## 2023-07-06 RX ORDER — ASPIRIN 325 MG
325 TABLET, DELAYED RELEASE (ENTERIC COATED) ORAL
Status: COMPLETED | OUTPATIENT
Start: 2023-07-06 | End: 2023-07-06

## 2023-07-06 RX ADMIN — ASPIRIN 325 MG: 325 TABLET, COATED ORAL at 08:07

## 2023-07-06 NOTE — FIRST PROVIDER EVALUATION
"Medical screening examination initiated.  I have conducted a focused provider triage encounter, findings are as follows:    Brief history of present illness:  Patient states chest pain, HTN, and dizziness starting today.     Vitals:    07/06/23 1717   BP: 126/88   Pulse: 74   Resp: (!) 25   Temp: 97.5 °F (36.4 °C)   SpO2: 96%   Weight: 97.5 kg (215 lb)   Height: 5' 6" (1.676 m)       Pertinent physical exam:  Awake, alert, ambulatory      Brief workup plan:  Labs, EKG, Imaging    Preliminary workup initiated; this workup will be continued and followed by the physician or advanced practice provider that is assigned to the patient when roomed.  "

## 2023-07-07 NOTE — ED PROVIDER NOTES
Encounter Date: 7/6/2023    SCRIBE #1 NOTE: I, Sofya Toth, am scribing for, and in the presence of,  Bella Dove MD. I have scribed the following portions of the note - Other sections scribed: HPI, ROS, PE.     History     Chief Complaint   Patient presents with    Chest Pain     Pt. C/o cp/ dizziness, htn started x1 hour.. reports hx of htn medications and stopped taking them in march. Reports hx of gastric sleeve  in march 2023     48 year old female with PMHx of HLD and HTN reports to the ED c/o chest pain. She reports having an episode of stabbing chest pain that lasted for about five minutes while sitting watching TV onset 1530 today. After experiencing the chest pain, she states that she recorded her blood pressure, which was 147 systolic. Associated symptoms include dizziness, lightheadedness, diaphoresis, and presyncope. She denies leg swelling, leg pain, reflux, shortness of breath, or nausea. She states that her chest pain was not worsened with ambulating, movement, or deep breaths. She states that she smokes 1 pack of cigarettes/day. She reports having a gastric sleeve surgery on March 1st from Zhou Rubio MD.     Pt PCP is Sheela Esparza NP.        The history is provided by the patient. No  was used.   Review of patient's allergies indicates:   Allergen Reactions    Codeine Swelling     Past Medical History:   Diagnosis Date    Anxiety     Depression     Hyperlipidemia     Hypertension     UBALDO (obstructive sleep apnea)      Past Surgical History:   Procedure Laterality Date    CHOLECYSTECTOMY      gastric sleeve  03/01/2023    SURGICAL REMOVAL OF MASS OF LOWER EXTREMITY Right 3/29/2023    Procedure: EXCISION, MASS, LOWER EXTREMITY;  Surgeon: Cody Joseph MD;  Location: [facility];  Service: General;  Laterality: Right;  Right lower extremity/ anterior knee    TUBAL LIGATION       Family History   Problem Relation Age of Onset    Hypertension Mother      Heart attack Father     Diabetes type II Father     Hypertension Brother     Brain cancer Maternal Aunt     Brain cancer Paternal Uncle      Social History     Tobacco Use    Smoking status: Former     Packs/day: 1.00     Years: 15.00     Pack years: 15.00     Types: Cigarettes     Quit date: 2022     Years since quittin.5    Smokeless tobacco: Never    Tobacco comments:     Pt quit 4 days ago.   Substance Use Topics    Alcohol use: Never    Drug use: Never     Review of Systems   Constitutional:  Positive for diaphoresis. Negative for chills and fever.   HENT:  Negative for congestion, ear pain, sinus pain and sore throat.    Eyes:  Negative for pain, discharge and visual disturbance.   Respiratory:  Negative for cough, shortness of breath, wheezing and stridor.    Cardiovascular:  Positive for chest pain. Negative for palpitations and leg swelling.   Gastrointestinal:  Negative for abdominal pain, constipation, diarrhea, nausea, rectal pain and vomiting.   Genitourinary:  Negative for dysuria and hematuria.   Musculoskeletal:  Negative for back pain and myalgias.   Skin:  Negative for rash.   Neurological:  Positive for dizziness and light-headedness. Negative for syncope, numbness and headaches.   Hematological: Negative.    Psychiatric/Behavioral: Negative.     All other systems reviewed and are negative.    Physical Exam     Initial Vitals [23 1717]   BP Pulse Resp Temp SpO2   126/88 74 (!) 25 97.5 °F (36.4 °C) 96 %      MAP       --         Physical Exam    Nursing note and vitals reviewed.  Constitutional: She appears well-developed and well-nourished. She is not diaphoretic. No distress.   HENT:   Head: Normocephalic and atraumatic.   Nose: Nose normal.   Mouth/Throat: Oropharynx is clear and moist.   Eyes: Conjunctivae and EOM are normal. Pupils are equal, round, and reactive to light.   Neck: Trachea normal. Neck supple.   Normal range of motion.  Cardiovascular:  Normal rate, regular rhythm,  normal heart sounds and intact distal pulses.           No murmur heard.  Pulmonary/Chest: Breath sounds normal. No respiratory distress. She has no wheezes. She has no rhonchi. She has no rales. She exhibits no tenderness.   Abdominal: Abdomen is soft. Bowel sounds are normal. She exhibits no distension and no mass. There is no abdominal tenderness. There is no rebound and no guarding.   Musculoskeletal:         General: No tenderness or edema. Normal range of motion.      Cervical back: Normal range of motion and neck supple.      Lumbar back: Normal. Normal range of motion.     Neurological: She is alert and oriented to person, place, and time. She has normal strength. No cranial nerve deficit or sensory deficit.   Skin: Skin is warm and dry. Capillary refill takes less than 2 seconds. No abscess noted. No erythema. No pallor.   Psychiatric: She has a normal mood and affect. Her behavior is normal. Judgment and thought content normal.       ED Course   Procedures  Labs Reviewed   COMPREHENSIVE METABOLIC PANEL - Abnormal; Notable for the following components:       Result Value    Carbon Dioxide 20 (*)     Blood Urea Nitrogen 23.4 (*)     Calcium Level Total 10.6 (*)     All other components within normal limits   CBC WITH DIFFERENTIAL - Abnormal; Notable for the following components:    MCHC 32.8 (*)     MPV 11.5 (*)     All other components within normal limits   B-TYPE NATRIURETIC PEPTIDE - Normal   LIPASE - Normal   TROPONIN I - Normal   TROPONIN I - Normal   CBC W/ AUTO DIFFERENTIAL    Narrative:     The following orders were created for panel order CBC Auto Differential.  Procedure                               Abnormality         Status                     ---------                               -----------         ------                     CBC with Differential[[id number]]        Abnormal            Final result                 Please view results for these tests on the individual orders.     EKG Readings:  (Independently Interpreted)   See ed course     Imaging Results              X-Ray Chest PA And Lateral (Final result)  Result time 07/06/23 18:06:00      Final result by Erwin Davis MD (07/06/23 18:06:00)                   Impression:      No abnormality seen      Electronically signed by: Erwin Davsi  Date:    07/06/2023  Time:    18:06               Narrative:    EXAMINATION:  XR CHEST PA AND LATERAL    CLINICAL HISTORY:  chest pain;    TECHNIQUE:  Two view of the chest was performed.    COMPARISON:  None available    FINDINGS:  The lungs are clear.  The heart is normal appearance.  The pulmonary vascularity is unremarkable.  Aorta appears grossly unremarkable.  No pleural effusions are seen.  Bones and joints show no acute abnormality.                                    X-Rays:   Independently Interpreted Readings:   Chest X-Ray: Normal heart size.  No infiltrates.  No acute abnormalities.   Medications   aspirin EC tablet 325 mg (325 mg Oral Given 7/6/23 2009)     Medical Decision Making:   History:   Old Medical Records: I decided to obtain old medical records.  Old Records Summarized: records from clinic visits.  Initial Assessment:   See hpi  Independently Interpreted Test(s):   I have ordered and independently interpreted X-rays - see prior notes.  I have ordered and independently interpreted EKG Reading(s) - see prior notes  Clinical Tests:   Lab Tests: Ordered and Reviewed  The following lab test(s) were unremarkable: CBC, CMP and Troponin  Radiological Study: Ordered and Reviewed  Medical Tests: Ordered and Reviewed        Scribe Attestation:   Scribe #1: I performed the above scribed service and the documentation accurately describes the services I performed. I attest to the accuracy of the note.  Comments: Attending:   Physician Attestation Statement for Scribe #1: I, Bella Dove MD, personally performed the services described in this documentation. All medical record entries  made by the scribe were at my direction and in my presence.  I have reviewed the chart and agree that the record reflects my personal performance and is accurate and complete.        Attending Attestation:           Physician Attestation for Scribe:  Physician Attestation Statement for Scribe #1: I, Bella Dove MD, reviewed documentation, as scribed by Sofya Toth in my presence, and it is both accurate and complete.       Medical Decision Making  Differential diagnosis include but are not limited to: ACS, GERD, and atypical chest pain.  Cbc, cmp, trop x 2 ekg and cxr orderd and reviewed without acute abnormality  Heart score 3  Atypical chest pain, ekg without abnormality, arranged cose f/u  Offered obs but pt declined, trop negative x2. Discussed return precautions and she voiced understanding and agrees and is comfortable with plan    Problems Addressed:  Atypical chest pain: acute illness or injury that poses a threat to life or bodily functions  Benign essential HTN: chronic illness or injury  Chest pain: acute illness or injury that poses a threat to life or bodily functions  Tobacco abuse: chronic illness or injury    Amount and/or Complexity of Data Reviewed  External Data Reviewed: notes.  Labs: ordered. Decision-making details documented in ED Course.  Radiology: ordered and independent interpretation performed. Decision-making details documented in ED Course.  ECG/medicine tests: ordered and independent interpretation performed.    Risk  OTC drugs.  Prescription drug management.           ED Course as of 07/06/23 2320   Thu Jul 06, 2023 1904 EKG performed at 12:23 p.m. rate of 93 normal sinus rhythm without acute ST or T-wave abnormality [BS]   1931 Heart score of 3.  Offered obs versus repeat troponin and cardiology follow up and she opted for cardiology follow up [BS]   2150 Updated on results, calling to arrange f/u [BS]      ED Course User Index  [BS] Bella Dove MD                  Clinical Impression:   Final diagnoses:  [R07.9] Chest pain  [R07.89] Atypical chest pain (Primary)  [Z72.0] Tobacco abuse  [I10] Benign essential HTN        ED Disposition Condition    Discharge Stable          ED Prescriptions    None       Follow-up Information       Follow up With Specialties Details Why Contact Info    [facility] Schedule an appointment as soon as possible for a visit   [address] [facility] [address]  [phone]      [facility] - Emergency Dept Emergency Medicine  As needed, If symptoms worsen [address] [facility] [id number] [phone]    Anselmo Swift MD Cardiology   [address]  [facility] [address]  [phone]               Bella Dove MD  07/06/23 2575

## 2023-07-25 ENCOUNTER — PATIENT MESSAGE (OUTPATIENT)
Dept: ADMINISTRATIVE | Facility: HOSPITAL | Age: 48
End: 2023-07-25
Payer: MEDICAID

## 2023-08-14 ENCOUNTER — LAB VISIT (OUTPATIENT)
Dept: LAB | Facility: HOSPITAL | Age: 48
End: 2023-08-14
Payer: MEDICAID

## 2023-08-14 DIAGNOSIS — N32.81 OAB (OVERACTIVE BLADDER): ICD-10-CM

## 2023-08-14 DIAGNOSIS — Z72.0 TOBACCO USER: ICD-10-CM

## 2023-08-14 DIAGNOSIS — I10 PRIMARY HYPERTENSION: ICD-10-CM

## 2023-08-14 DIAGNOSIS — E78.1 HYPERTRIGLYCERIDEMIA: ICD-10-CM

## 2023-08-14 DIAGNOSIS — Z13.0 SCREENING FOR IRON DEFICIENCY ANEMIA: ICD-10-CM

## 2023-08-14 LAB
ALBUMIN SERPL-MCNC: 4 G/DL (ref 3.5–5)
ALBUMIN/GLOB SERPL: 1.2 RATIO (ref 1.1–2)
ALP SERPL-CCNC: 58 UNIT/L (ref 40–150)
ALT SERPL-CCNC: 38 UNIT/L (ref 0–55)
APPEARANCE UR: ABNORMAL
AST SERPL-CCNC: 20 UNIT/L (ref 5–34)
BACTERIA #/AREA URNS AUTO: ABNORMAL /HPF
BASOPHILS # BLD AUTO: 0.03 X10(3)/MCL
BASOPHILS NFR BLD AUTO: 0.3 %
BILIRUB SERPL-MCNC: 0.5 MG/DL
BILIRUB UR QL STRIP.AUTO: NEGATIVE
BUN SERPL-MCNC: 19.9 MG/DL (ref 9.8–20.1)
CALCIUM SERPL-MCNC: 9.9 MG/DL (ref 8.4–10.2)
CHLORIDE SERPL-SCNC: 108 MMOL/L (ref 98–107)
CHOLEST SERPL-MCNC: 148 MG/DL
CHOLEST/HDLC SERPL: 4 {RATIO} (ref 0–5)
CO2 SERPL-SCNC: 26 MMOL/L (ref 22–29)
COLOR UR: YELLOW
CREAT SERPL-MCNC: 0.9 MG/DL (ref 0.55–1.02)
CREAT UR-MCNC: 111 MG/DL (ref 45–106)
DEPRECATED CALCIDIOL+CALCIFEROL SERPL-MC: 82.6 NG/ML (ref 30–80)
EOSINOPHIL # BLD AUTO: 0.1 X10(3)/MCL (ref 0–0.9)
EOSINOPHIL NFR BLD AUTO: 1.1 %
ERYTHROCYTE [DISTWIDTH] IN BLOOD BY AUTOMATED COUNT: 13.1 % (ref 11.5–17)
EST. AVERAGE GLUCOSE BLD GHB EST-MCNC: 91.1 MG/DL
FERRITIN SERPL-MCNC: 297.56 NG/ML (ref 4.63–204)
GFR SERPLBLD CREATININE-BSD FMLA CKD-EPI: >60 MLS/MIN/1.73/M2
GLOBULIN SER-MCNC: 3.3 GM/DL (ref 2.4–3.5)
GLUCOSE SERPL-MCNC: 94 MG/DL (ref 74–100)
GLUCOSE UR QL STRIP.AUTO: NORMAL
HBA1C MFR BLD: 4.8 %
HCT VFR BLD AUTO: 46.1 % (ref 37–47)
HDLC SERPL-MCNC: 36 MG/DL (ref 35–60)
HGB BLD-MCNC: 15.6 G/DL (ref 12–16)
HYALINE CASTS #/AREA URNS LPF: ABNORMAL /LPF
IMM GRANULOCYTES # BLD AUTO: 0.03 X10(3)/MCL (ref 0–0.04)
IMM GRANULOCYTES NFR BLD AUTO: 0.3 %
IRON SATN MFR SERPL: 35 % (ref 20–50)
IRON SERPL-MCNC: 98 UG/DL (ref 50–170)
KETONES UR QL STRIP.AUTO: NEGATIVE
LDLC SERPL CALC-MCNC: 80 MG/DL (ref 50–140)
LEUKOCYTE ESTERASE UR QL STRIP.AUTO: NEGATIVE
LYMPHOCYTES # BLD AUTO: 3.36 X10(3)/MCL (ref 0.6–4.6)
LYMPHOCYTES NFR BLD AUTO: 37.3 %
MCH RBC QN AUTO: 31.4 PG (ref 27–31)
MCHC RBC AUTO-ENTMCNC: 33.8 G/DL (ref 33–36)
MCV RBC AUTO: 92.8 FL (ref 80–94)
MICROALBUMIN UR-MCNC: <5 UG/ML
MICROALBUMIN/CREAT RATIO PNL UR: ABNORMAL
MONOCYTES # BLD AUTO: 0.52 X10(3)/MCL (ref 0.1–1.3)
MONOCYTES NFR BLD AUTO: 5.8 %
MUCOUS THREADS URNS QL MICRO: ABNORMAL /LPF
NEUTROPHILS # BLD AUTO: 4.96 X10(3)/MCL (ref 2.1–9.2)
NEUTROPHILS NFR BLD AUTO: 55.2 %
NITRITE UR QL STRIP.AUTO: NEGATIVE
NRBC BLD AUTO-RTO: 0 %
PH UR STRIP.AUTO: 7 [PH]
PLATELET # BLD AUTO: 200 X10(3)/MCL (ref 130–400)
PMV BLD AUTO: 11.2 FL (ref 7.4–10.4)
POTASSIUM SERPL-SCNC: 4.7 MMOL/L (ref 3.5–5.1)
PROT SERPL-MCNC: 7.3 GM/DL (ref 6.4–8.3)
PROT UR QL STRIP.AUTO: NEGATIVE
RBC # BLD AUTO: 4.97 X10(6)/MCL (ref 4.2–5.4)
RBC #/AREA URNS AUTO: ABNORMAL /HPF
RBC UR QL AUTO: NEGATIVE
SODIUM SERPL-SCNC: 143 MMOL/L (ref 136–145)
SP GR UR STRIP.AUTO: 1.02
SQUAMOUS #/AREA URNS LPF: ABNORMAL /HPF
T4 FREE SERPL-MCNC: 1.01 NG/DL (ref 0.7–1.48)
TIBC SERPL-MCNC: 182 UG/DL (ref 70–310)
TIBC SERPL-MCNC: 280 UG/DL (ref 250–450)
TRANSFERRIN SERPL-MCNC: 237 MG/DL (ref 180–382)
TRIGL SERPL-MCNC: 159 MG/DL (ref 37–140)
TSH SERPL-ACNC: 2.39 UIU/ML (ref 0.35–4.94)
UROBILINOGEN UR STRIP-ACNC: NORMAL
VLDLC SERPL CALC-MCNC: 32 MG/DL
WBC # SPEC AUTO: 9 X10(3)/MCL (ref 4.5–11.5)
WBC #/AREA URNS AUTO: ABNORMAL /HPF

## 2023-08-14 PROCEDURE — 82306 VITAMIN D 25 HYDROXY: CPT

## 2023-08-14 PROCEDURE — 36415 COLL VENOUS BLD VENIPUNCTURE: CPT

## 2023-08-14 PROCEDURE — 83036 HEMOGLOBIN GLYCOSYLATED A1C: CPT

## 2023-08-14 PROCEDURE — 87088 URINE BACTERIA CULTURE: CPT

## 2023-08-14 PROCEDURE — 84439 ASSAY OF FREE THYROXINE: CPT

## 2023-08-14 PROCEDURE — 80053 COMPREHEN METABOLIC PANEL: CPT

## 2023-08-14 PROCEDURE — 85025 COMPLETE CBC W/AUTO DIFF WBC: CPT

## 2023-08-14 PROCEDURE — 84443 ASSAY THYROID STIM HORMONE: CPT

## 2023-08-14 PROCEDURE — 82728 ASSAY OF FERRITIN: CPT

## 2023-08-14 PROCEDURE — 82043 UR ALBUMIN QUANTITATIVE: CPT

## 2023-08-14 PROCEDURE — 80061 LIPID PANEL: CPT

## 2023-08-14 PROCEDURE — 81001 URINALYSIS AUTO W/SCOPE: CPT

## 2023-08-14 PROCEDURE — 83550 IRON BINDING TEST: CPT

## 2023-08-16 LAB — BACTERIA UR CULT: NORMAL

## 2023-08-17 ENCOUNTER — OFFICE VISIT (OUTPATIENT)
Dept: INTERNAL MEDICINE | Facility: CLINIC | Age: 48
End: 2023-08-17
Payer: MEDICAID

## 2023-08-17 VITALS
RESPIRATION RATE: 20 BRPM | OXYGEN SATURATION: 97 % | WEIGHT: 209.44 LBS | BODY MASS INDEX: 33.66 KG/M2 | TEMPERATURE: 98 F | DIASTOLIC BLOOD PRESSURE: 85 MMHG | SYSTOLIC BLOOD PRESSURE: 120 MMHG | HEIGHT: 66 IN | HEART RATE: 73 BPM

## 2023-08-17 DIAGNOSIS — R06.2 WHEEZING: ICD-10-CM

## 2023-08-17 DIAGNOSIS — N32.81 OAB (OVERACTIVE BLADDER): ICD-10-CM

## 2023-08-17 DIAGNOSIS — E78.1 HYPERTRIGLYCERIDEMIA: ICD-10-CM

## 2023-08-17 DIAGNOSIS — L02.92 BOIL: ICD-10-CM

## 2023-08-17 DIAGNOSIS — Z00.00 WELL ADULT EXAM: Primary | ICD-10-CM

## 2023-08-17 DIAGNOSIS — K21.9 GASTROESOPHAGEAL REFLUX DISEASE WITHOUT ESOPHAGITIS: ICD-10-CM

## 2023-08-17 DIAGNOSIS — Z72.0 TOBACCO USER: ICD-10-CM

## 2023-08-17 DIAGNOSIS — Z12.11 SCREENING FOR MALIGNANT NEOPLASM OF COLON: ICD-10-CM

## 2023-08-17 DIAGNOSIS — F33.42 RECURRENT MAJOR DEPRESSIVE DISORDER, IN FULL REMISSION: ICD-10-CM

## 2023-08-17 DIAGNOSIS — G47.33 OSA (OBSTRUCTIVE SLEEP APNEA): ICD-10-CM

## 2023-08-17 DIAGNOSIS — M17.12 PRIMARY OSTEOARTHRITIS OF LEFT KNEE: ICD-10-CM

## 2023-08-17 DIAGNOSIS — Z23 IMMUNIZATION DUE: ICD-10-CM

## 2023-08-17 DIAGNOSIS — I10 PRIMARY HYPERTENSION: ICD-10-CM

## 2023-08-17 PROCEDURE — 3044F PR MOST RECENT HEMOGLOBIN A1C LEVEL <7.0%: ICD-10-PCS | Mod: CPTII,,,

## 2023-08-17 PROCEDURE — 99214 PR OFFICE/OUTPT VISIT, EST, LEVL IV, 30-39 MIN: ICD-10-PCS | Mod: S$PBB,25,,

## 2023-08-17 PROCEDURE — 1159F MED LIST DOCD IN RCRD: CPT | Mod: CPTII,,,

## 2023-08-17 PROCEDURE — 1160F PR REVIEW ALL MEDS BY PRESCRIBER/CLIN PHARMACIST DOCUMENTED: ICD-10-PCS | Mod: CPTII,,,

## 2023-08-17 PROCEDURE — 3074F PR MOST RECENT SYSTOLIC BLOOD PRESSURE < 130 MM HG: ICD-10-PCS | Mod: CPTII,,,

## 2023-08-17 PROCEDURE — 3079F DIAST BP 80-89 MM HG: CPT | Mod: CPTII,,,

## 2023-08-17 PROCEDURE — 99396 PREV VISIT EST AGE 40-64: CPT | Mod: S$PBB,,,

## 2023-08-17 PROCEDURE — 3008F PR BODY MASS INDEX (BMI) DOCUMENTED: ICD-10-PCS | Mod: CPTII,,,

## 2023-08-17 PROCEDURE — 99214 OFFICE O/P EST MOD 30 MIN: CPT | Mod: PBBFAC

## 2023-08-17 PROCEDURE — 99214 OFFICE O/P EST MOD 30 MIN: CPT | Mod: S$PBB,25,,

## 2023-08-17 PROCEDURE — 3066F NEPHROPATHY DOC TX: CPT | Mod: CPTII,,,

## 2023-08-17 PROCEDURE — 3066F PR DOCUMENTATION OF TREATMENT FOR NEPHROPATHY: ICD-10-PCS | Mod: CPTII,,,

## 2023-08-17 PROCEDURE — 1160F RVW MEDS BY RX/DR IN RCRD: CPT | Mod: CPTII,,,

## 2023-08-17 PROCEDURE — 3061F NEG MICROALBUMINURIA REV: CPT | Mod: CPTII,,,

## 2023-08-17 PROCEDURE — 3008F BODY MASS INDEX DOCD: CPT | Mod: CPTII,,,

## 2023-08-17 PROCEDURE — 90750 HZV VACC RECOMBINANT IM: CPT | Mod: PBBFAC

## 2023-08-17 PROCEDURE — 1159F PR MEDICATION LIST DOCUMENTED IN MEDICAL RECORD: ICD-10-PCS | Mod: CPTII,,,

## 2023-08-17 PROCEDURE — 3074F SYST BP LT 130 MM HG: CPT | Mod: CPTII,,,

## 2023-08-17 PROCEDURE — 3044F HG A1C LEVEL LT 7.0%: CPT | Mod: CPTII,,,

## 2023-08-17 PROCEDURE — 99396 PR PREVENTIVE VISIT,EST,40-64: ICD-10-PCS | Mod: S$PBB,,,

## 2023-08-17 PROCEDURE — 90471 IMMUNIZATION ADMIN: CPT | Mod: PBBFAC

## 2023-08-17 PROCEDURE — 3079F PR MOST RECENT DIASTOLIC BLOOD PRESSURE 80-89 MM HG: ICD-10-PCS | Mod: CPTII,,,

## 2023-08-17 PROCEDURE — 3061F PR NEG MICROALBUMINURIA RESULT DOCUMENTED/REVIEW: ICD-10-PCS | Mod: CPTII,,,

## 2023-08-17 RX ORDER — PANTOPRAZOLE SODIUM 40 MG/1
40 TABLET, DELAYED RELEASE ORAL DAILY
Qty: 90 TABLET | Refills: 1 | Status: SHIPPED | OUTPATIENT
Start: 2023-08-17 | End: 2024-02-06 | Stop reason: SDUPTHER

## 2023-08-17 RX ORDER — DOXYCYCLINE 100 MG/1
100 CAPSULE ORAL EVERY 12 HOURS
Qty: 10 CAPSULE | Refills: 0 | Status: SHIPPED | OUTPATIENT
Start: 2023-08-17 | End: 2023-08-22

## 2023-08-17 RX ORDER — DICLOFENAC SODIUM 10 MG/G
4 GEL TOPICAL 3 TIMES DAILY PRN
Qty: 100 G | Refills: 2 | Status: SHIPPED | OUTPATIENT
Start: 2023-08-17 | End: 2024-08-16

## 2023-08-17 RX ORDER — OXYBUTYNIN CHLORIDE 5 MG/1
5 TABLET ORAL 2 TIMES DAILY
Qty: 180 TABLET | Refills: 1 | Status: SHIPPED | OUTPATIENT
Start: 2023-08-17 | End: 2024-02-16 | Stop reason: SDUPTHER

## 2023-08-17 RX ORDER — MUPIROCIN 20 MG/G
OINTMENT TOPICAL 3 TIMES DAILY
Qty: 30 G | Refills: 1 | Status: SHIPPED | OUTPATIENT
Start: 2023-08-17 | End: 2024-02-16

## 2023-08-17 RX ORDER — PRAVASTATIN SODIUM 40 MG/1
40 TABLET ORAL NIGHTLY
Qty: 90 TABLET | Refills: 1 | Status: SHIPPED | OUTPATIENT
Start: 2023-08-17 | End: 2024-02-16 | Stop reason: SDUPTHER

## 2023-08-17 RX ORDER — SERTRALINE HYDROCHLORIDE 100 MG/1
200 TABLET, FILM COATED ORAL DAILY
Qty: 180 TABLET | Refills: 1 | Status: SHIPPED | OUTPATIENT
Start: 2023-08-17 | End: 2024-02-16 | Stop reason: SDUPTHER

## 2023-08-17 RX ORDER — FENOFIBRATE 160 MG/1
160 TABLET ORAL DAILY
Qty: 90 TABLET | Refills: 1 | Status: SHIPPED | OUTPATIENT
Start: 2023-08-17 | End: 2024-02-16 | Stop reason: SDUPTHER

## 2023-08-17 RX ORDER — ALBUTEROL SULFATE 90 UG/1
2 AEROSOL, METERED RESPIRATORY (INHALATION) EVERY 6 HOURS PRN
Qty: 18 G | Refills: 1 | Status: SHIPPED | OUTPATIENT
Start: 2023-08-17 | End: 2023-10-23 | Stop reason: SDUPTHER

## 2023-08-17 NOTE — PROGRESS NOTES
PATIENT NAME: Lina Kat  : 1975  DATE: 23  MRN: [id number]          Reason for Visit/Chief Complaint   Follow-up, Labs Only, Annual Exam, and Hypertension       History of Present Illness (HPI)     Lina Kat is a 48 y.o. White female presenting in clinic today for Follow-up, Labs Only, Annual Exam, and Hypertension. PMH of anxiety/depression (controlled), HTN, UBALDO with CPAP, tobacco use, VSG (3/1/2023 with Dr. Zhou Rubio). On 3/29/2023, she had an excisional biopsy of lipoma of right knee. She is followed by [facility] orthopedic and GYN clinics.     All pertinent labs dated 2023 and diagnotic tests reviewed and discussed with patient. BP-120/85 - at goal. Denies CP, SOB, HA, dizziness, or vision changes.     Reports having a recurrent boil on left lower abdomen. Endorses boil as painful, red, and draining pus, but no drainage today. Has not tried any prior treatment.     Continues to smoke 1 ppd - not ready to quit. Denies alcohol or illicit drug use. Denies chest pain, shortness of breath, cough, headache, dizziness, weakness, abdominal pain, nausea, vomiting, diarrhea, constipation, dysuria, depression, anxiety, SI, and HI.    Breast Cancer Screening - 2022: MMG-Negative. Continue annual screenings. (Scheduled 2023).  Cervical Cancer Screening- PAP 2023 - NIL. Follow up with GYN annually for Pelvic/Pap.  LDCT - Declines.  Osteoporosis Screening - Declines.  Colon Cancer Screening - No prior testing. Cologuard ordered, has not returned. Extensive conversation in regards to returning Cologuard ASAP. Reordered on 2023.  Vaccines: Flu 2022 / Pneumonia 2018, 2022 / Tetanus 2019 / Shingles #1 - 2023, #2 - 2023       Review of Systems     Review of Systems   Constitutional: Negative.    HENT: Negative.     Eyes: Negative.    Respiratory: Negative.     Cardiovascular: Negative.    Gastrointestinal: Negative.    Endocrine: Negative.    Genitourinary:  Negative.    Musculoskeletal:  Positive for arthralgias.   Skin:  Positive for wound.   Allergic/Immunologic: Negative.    Neurological: Negative.    Hematological: Negative.    Psychiatric/Behavioral: Negative.     All other systems reviewed and are negative.      Medical / Social / Family History     Past Medical History:   Diagnosis Date    Anxiety     Depression     Hyperlipidemia     Hypertension     UBALDO (obstructive sleep apnea)          Past Surgical History:   Procedure Laterality Date    CHOLECYSTECTOMY      gastric sleeve  03/01/2023    SURGICAL REMOVAL OF MASS OF LOWER EXTREMITY Right 3/29/2023    Procedure: EXCISION, MASS, LOWER EXTREMITY;  Surgeon: Cody Joseph MD;  Location: [facility];  Service: General;  Laterality: Right;  Right lower extremity/ anterior knee    TUBAL LIGATION           Social History  Lina Kat's  reports that she quit smoking about 8 months ago. Her smoking use included cigarettes. She started smoking about 15 years ago. She has a 15.0 pack-year smoking history. She has never used smokeless tobacco. She reports that she does not drink alcohol and does not use drugs.    Family History  Lina Kat's family history includes Brain cancer in her maternal aunt and paternal uncle; Diabetes type II in her father; Heart attack in her father; Hypertension in her brother and mother.    Medications and Allergies     Medications  Current Outpatient Medications   Medication Instructions    albuterol (PROVENTIL/VENTOLIN HFA) 90 mcg/actuation inhaler 2 puffs, Inhalation, Every 6 hours PRN    diclofenac sodium (VOLTAREN) 4 g, Topical (Top), 3 times daily PRN    doxycycline (MONODOX) 100 mg, Oral, Every 12 hours    fenofibrate 160 mg, Oral, Daily    mupirocin (BACTROBAN) 2 % ointment Topical (Top), 3 times daily    oxybutynin (DITROPAN) 5 mg, Oral, 2 times daily    pantoprazole (PROTONIX) 40 mg, Oral, Daily    pravastatin (PRAVACHOL) 40 mg, Oral, Nightly    sertraline (ZOLOFT) 200 mg, Oral,  "Daily       Allergies  Review of patient's allergies indicates:   Allergen Reactions    Codeine Swelling       Physical Examination   Visit Vitals  /85 (BP Location: Right arm, Patient Position: Sitting, BP Method: Large (Automatic))   Pulse 73   Temp 98.4 °F (36.9 °C) (Oral)   Resp 20   Ht 5' 6" (1.676 m)   Wt 95 kg (209 lb 7 oz)   SpO2 97%   BMI 33.80 kg/m²       Physical Exam  Vitals reviewed.   Constitutional:       Appearance: Normal appearance. She is normal weight.   HENT:      Head: Normocephalic and atraumatic.      Right Ear: External ear normal.      Left Ear: External ear normal.      Nose: Nose normal.      Mouth/Throat:      Mouth: Mucous membranes are moist.      Pharynx: Oropharynx is clear.   Eyes:      Extraocular Movements: Extraocular movements intact.      Conjunctiva/sclera: Conjunctivae normal.      Pupils: Pupils are equal, round, and reactive to light.   Cardiovascular:      Rate and Rhythm: Normal rate and regular rhythm.      Pulses: Normal pulses.      Heart sounds: Normal heart sounds.   Pulmonary:      Effort: Pulmonary effort is normal.      Breath sounds: Normal breath sounds.   Abdominal:      General: Bowel sounds are normal.      Palpations: Abdomen is soft.   Musculoskeletal:      Cervical back: Normal range of motion and neck supple.      Left knee: Effusion present. Decreased range of motion. Tenderness present. No medial joint line tenderness.   Skin:     General: Skin is warm and dry.      Capillary Refill: Capillary refill takes less than 2 seconds.      Findings: Erythema present.          Neurological:      General: No focal deficit present.      Mental Status: She is alert and oriented to person, place, and time.   Psychiatric:         Mood and Affect: Mood normal.         Behavior: Behavior normal.         Thought Content: Thought content normal.         Judgment: Judgment normal.           Results     Lab Results   Component Value Date    WBC 9.00 08/14/2023    RBC " 4.97 08/14/2023    HGB 15.6 08/14/2023    HCT 46.1 08/14/2023    MCV 92.8 08/14/2023    MCH 31.4 (H) 08/14/2023    MCHC 33.8 08/14/2023    RDW 13.1 08/14/2023     08/14/2023    MPV 11.2 (H) 08/14/2023      Lab Results   Component Value Date     08/14/2023    K 4.7 08/14/2023    CHLORIDE 108 (H) 08/14/2023    CO2 26 08/14/2023    GLUCOSE 94 08/14/2023    BUN 19.9 08/14/2023    CREATININE 0.90 08/14/2023    LABPROT 7.3 08/14/2023    ALBUMIN 4.0 08/14/2023    BILITOT 0.5 08/14/2023    ALKPHOS 58 08/14/2023    AST 20 08/14/2023    ALT 38 08/14/2023    EGFRNORACEVR >60 08/14/2023     Lab Results   Component Value Date    TSH 2.389 08/14/2023     Lab Results   Component Value Date    CHOL 148 08/14/2023    HDL 36 08/14/2023    LDL 80.00 08/14/2023    TRIG 159 (H) 08/14/2023     Lab Results   Component Value Date    COLORUA Yellow 08/14/2023    SGUA 1.020 08/14/2023    PROTEINUA Negative 08/14/2023    GLUCOSEUA Normal 08/14/2023    BILIRUBINUA Negative 08/14/2023    BLOODUA Negative 08/14/2023    WBCUA 0-5 08/14/2023    RBCUA 0-5 08/14/2023    BACTERIA Trace (A) 08/14/2023    NITRITESUA Negative 08/14/2023    LEUKOCYTESUR Negative 08/14/2023    UROBILINOGEN Normal 08/14/2023     Lab Results   Component Value Date    CREATRANDUR 111.0 (H) 08/14/2023    MICALBCREAT  08/14/2023      Comment:      Unable to calculate     Lab Results   Component Value Date    LFSHXERT29KN 82.6 (H) 08/14/2023     Lab Results   Component Value Date    HIV Nonreactive 04/14/2023    HEPAIGM Nonreactive 04/14/2023    HEPBCOREM Nonreactive 04/14/2023    HEPCAB Nonreactive 04/14/2023       Assessment and Plan (including Health Maintenance)     Problem List Items Addressed This Visit          Psychiatric    Recurrent major depressive disorder, in full remission    Current Assessment & Plan     Controlled.  Continue sertraline - refilled today.  Read positive daily meditations, avoid negative media, set healthy boundaries.   Exercise daily,  "keep consistent sleep pattern, eat a healthy diet.   Establish good social support, make changes to reduce stress.   Do not drink alcohol or use illicit drugs.   Reports any symptoms of suicidal/homicidal ideations or self harm immediately, go to nearest emergency room.           Relevant Medications    sertraline (ZOLOFT) 100 MG tablet       Pulmonary    Wheezing    Current Assessment & Plan     Symptoms controlled with albuterol - refilled today.           Relevant Medications    albuterol (PROVENTIL/VENTOLIN HFA) 90 mcg/actuation inhaler       Cardiac/Vascular    Hypertension    Current Assessment & Plan     Vitals:    08/17/23 0911   BP: 120/85   Pulse: 73   Resp: 20   Temp: 98.4 °F (36.9 °C)   TempSrc: Oral   SpO2: 97%   Weight: 95 kg (209 lb 7 oz)   Height: 5' 6" (1.676 m)   At goal.  Diet controlled.  Follow a low sodium (less than 2 grams of sodium per day), DASH diet.   Monitor blood pressure and report any consistent values greater than 140/90 and keep a log.  Encouraged smoking cessation to aid in BP reduction and co-morbidities.   Maintain healthy weight with a BMI goal of <30.   Aerobic exercise for 150 minutes per week (or 5 days a week for 30 minutes each day).          Relevant Orders    CBC Auto Differential    Comprehensive Metabolic Panel    Microalbumin/Creatinine Ratio, Urine    Urinalysis, Reflex to Urine Culture    Hypertriglyceridemia    Current Assessment & Plan     Lab Results   Component Value Date    LDL 80.00 08/14/2023       Lab Results   Component Value Date    TRIG 159 (H) 08/14/2023       Lab Results   Component Value Date    HDL 36 08/14/2023      Lab Results   Component Value Date    CHOL 148 08/14/2023   Continue pravastatin and fenofibrate - refilled today.  Follow a low cholesterol, low saturated fat diet with less than 200 mg of cholesterol a day.   Avoid fried foods and high saturated fats.  Add flax seed or fish oil supplements to diet.   Increase dietary fiber.   Regular " exercise improves cholesterol levels.  Physical activity 5 times a week for 30 minutes per day (or 150 minutes per week).   Stressed importance of dietary modifications.           Relevant Medications    pravastatin (PRAVACHOL) 40 MG tablet    fenofibrate 160 MG Tab    Other Relevant Orders    Lipid Panel       Renal/    OAB (overactive bladder)    Current Assessment & Plan     Symptoms controlled with oxybutynin 5 mg twice daily - refilled today..  Timed voiding every 2-3 hours.   Avoidance of bladder irritants such as alcohol citrus foods & drinks, chocolates, caffeinated drinks, energy drinks, spicy foods, sodas.  Increase water intake.           Relevant Medications    oxybutynin (DITROPAN) 5 MG Tab       ID    Immunization due    Current Assessment & Plan     Written and verbal consent obtained.  Shingles #2 vaccine was administered.  Ok to take acetaminophen for soreness of arm.          Relevant Orders    Zoster Recombinant Vaccine (Completed)    Boil    Current Assessment & Plan     Rx doxycycline.  Rx mupirocen.  Apply warm compress 3 times daily.         Relevant Medications    doxycycline (MONODOX) 100 MG capsule    mupirocin (BACTROBAN) 2 % ointment       Endocrine    BMI 33.0-33.9,adult    Current Assessment & Plan     Body mass index is 33.8 kg/m².  Goal BMI <30.  Aerobic exercise 150 minutes per week.  Avoid soda, simple sugars, sweets, excessive rice, pasta, potatoes or bread.   Choose brown options when available and portion control.  Limit fast foods and fried foods.   Choose complex carbs in moderation (ex: green, leafy vegetables, beans, oatmeal).  Eat plenty of fresh fruits and vegetables with lean meats daily.   Consider permanent healthy lifestyle changes.             GI    Screening for malignant neoplasm of colon    Current Assessment & Plan     Cologuard ordered, will refer to GI if indicated.          Relevant Orders    Cologuard Screening (Multitarget Stool DNA)    Gastroesophageal  reflux disease without esophagitis    Current Assessment & Plan     Avoid spicy, acidic, fried food and alcohol.    Eat 2-3 hours before bed.   Avoid tight fitting clothes and chew food thoroughly.    Reduce caffeine intake, avoid soda.    Take pantoprazole as prescribed - refilled today.    Encouraged smoking cessation.           Relevant Medications    pantoprazole (PROTONIX) 40 MG tablet       Orthopedic    Primary osteoarthritis of left knee    Current Assessment & Plan     Physical activity 5 times a week for 30 minutes per day (or 150 minutes per week).   Avoid activities than increase pain or stiffness.   Apply heating pad, ice pack, and BioFreeze/topical capsaicin as needed; alternate every 15-20 minutes.   Continue NSAID/muscle relaxer as needed.   Refill diclofenac gel - take as prescribed.         Relevant Medications    diclofenac sodium (VOLTAREN) 1 % Gel       Other    UBALDO (obstructive sleep apnea)    Current Assessment & Plan     Reports compliance with wearing CPAP nightly. Reports decreased EDS, snoring and fatigue. Pt is benefiting from its use. Expect lifetime use and decreased daytime sleepiness/fatigue. Avoid excessive alcohol, smoking and overuse of sedatives at bedtime.          Tobacco user    Current Assessment & Plan     Smoking cessation discussed for 3 minutes.   Pt not ready to quit.  Declines referral to Smoking Cessation program.  Discussed benefits of quitting including improved health, decreased cardiac/vascular/pulmonary/stroke risks as well as saving money.          Well adult exam - Primary    Current Assessment & Plan     Wellness labs - 8/14/2023  Breast Cancer Screening - 8/17/2022: MMG-Negative. Continue annual screenings. (Scheduled 8/18/2023).  Cervical Cancer Screening- PAP 4/28/2023 - NIL. Follow up with GYN annually for Pelvic/Pap.  LDCT - Declines.  Osteoporosis Screening - Declines.  Colon Cancer Screening - No prior testing. Cologuard ordered, has not returned.  Extensive conversation in regards to returning Cologuard ASAP. Reordered on 8/17/2023.  Vaccines: Flu 12/16/2022 / Pneumonia 8/7/2018, 12/16/2022 / Tetanus 7/22/2019 / Shingles #1 - 4/17/2023, #2 - 8/17/2023           Health Maintenance Due   Topic Date Due    Colorectal Cancer Screening  Never done    Mammogram  08/17/2023     Tests to Keep You Healthy    Mammogram: Met on 8/17/2022  Colon Cancer Screening: ORDERED  Cervical Cancer Screening: Met on 4/28/2023  Last Blood Pressure <= 139/89 (8/17/2023): Yes  Tobacco Cessation: Yes      Health Maintenance Topics with due status: Not Due       Topic Last Completion Date    TETANUS VACCINE 07/22/2019    Influenza Vaccine 12/16/2022    Cervical Cancer Screening 04/28/2023    Hemoglobin A1c (Prediabetes) 08/14/2023    Lipid Panel 08/14/2023       Future Appointments   Date Time Provider Department Center   8/18/2023 10:30 AM [facility] BREAST CENTER MAMMO2 SCR2 [facility]STEN [place]   10/12/2023  9:00 AM Vanessa Eugene, NP [facility] ORTHO [place]   2/16/2024  8:15 AM Sheela Esparza FNP [facility] INTMED [place]   5/3/2024  9:30 AM Faina Weinstein, ANP [facility] GYN [place]        Follow up in about 6 months (around 2/17/2024) for Follow up, Med check, Lab review, RTC PRN, Virtual Visit.      Signature:        RAMIRO Rodgers  [facility]  [address] [facility] [id number]    Date of encounter: 8/17/23

## 2023-08-17 NOTE — ASSESSMENT & PLAN NOTE
Written and verbal consent obtained.  Shingles #2 vaccine was administered.  Ok to take acetaminophen for soreness of arm.

## 2023-08-18 ENCOUNTER — HOSPITAL ENCOUNTER (OUTPATIENT)
Dept: RADIOLOGY | Facility: HOSPITAL | Age: 48
Discharge: HOME | End: 2023-08-18
Attending: NURSE PRACTITIONER
Payer: MEDICAID

## 2023-08-18 DIAGNOSIS — Z12.31 VISIT FOR SCREENING MAMMOGRAM: ICD-10-CM

## 2023-08-18 PROBLEM — Z01.818 PREOPERATIVE CLEARANCE: Status: RESOLVED | Noted: 2022-09-22 | Resolved: 2023-08-18

## 2023-08-18 PROBLEM — M25.562 ACUTE PAIN OF LEFT KNEE: Status: RESOLVED | Noted: 2023-04-17 | Resolved: 2023-08-18

## 2023-08-18 PROBLEM — D17.20 LIPOMA OF EXTREMITY: Status: RESOLVED | Noted: 2022-12-16 | Resolved: 2023-08-18

## 2023-08-18 PROBLEM — K21.9 GASTROESOPHAGEAL REFLUX DISEASE WITHOUT ESOPHAGITIS: Status: ACTIVE | Noted: 2023-08-18

## 2023-08-18 PROCEDURE — 77067 MAMMO DIGITAL SCREENING BILAT WITH TOMO: ICD-10-PCS | Mod: 26,,, | Performed by: RADIOLOGY

## 2023-08-18 PROCEDURE — 77067 SCR MAMMO BI INCL CAD: CPT | Mod: 26,,, | Performed by: RADIOLOGY

## 2023-08-18 PROCEDURE — 77063 BREAST TOMOSYNTHESIS BI: CPT | Mod: 26,,, | Performed by: RADIOLOGY

## 2023-08-18 PROCEDURE — 77063 MAMMO DIGITAL SCREENING BILAT WITH TOMO: ICD-10-PCS | Mod: 26,,, | Performed by: RADIOLOGY

## 2023-08-18 PROCEDURE — 77067 SCR MAMMO BI INCL CAD: CPT | Mod: TC

## 2023-08-18 NOTE — ASSESSMENT & PLAN NOTE
Symptoms controlled with oxybutynin 5 mg twice daily - refilled today..  Timed voiding every 2-3 hours.   Avoidance of bladder irritants such as alcohol citrus foods & drinks, chocolates, caffeinated drinks, energy drinks, spicy foods, sodas.  Increase water intake.

## 2023-08-18 NOTE — ASSESSMENT & PLAN NOTE
Avoid spicy, acidic, fried food and alcohol.    Eat 2-3 hours before bed.   Avoid tight fitting clothes and chew food thoroughly.    Reduce caffeine intake, avoid soda.    Take pantoprazole as prescribed - refilled today.    Encouraged smoking cessation.

## 2023-08-18 NOTE — ASSESSMENT & PLAN NOTE
"Vitals:    08/17/23 0911   BP: 120/85   Pulse: 73   Resp: 20   Temp: 98.4 °F (36.9 °C)   TempSrc: Oral   SpO2: 97%   Weight: 95 kg (209 lb 7 oz)   Height: 5' 6" (1.676 m)     At goal.  Diet controlled.  Follow a low sodium (less than 2 grams of sodium per day), DASH diet.   Monitor blood pressure and report any consistent values greater than 140/90 and keep a log.  Encouraged smoking cessation to aid in BP reduction and co-morbidities.   Maintain healthy weight with a BMI goal of <30.   Aerobic exercise for 150 minutes per week (or 5 days a week for 30 minutes each day).   "

## 2023-08-18 NOTE — ASSESSMENT & PLAN NOTE
>>ASSESSMENT AND PLAN FOR RECURRENT MAJOR DEPRESSIVE DISORDER, IN FULL REMISSION WRITTEN ON 8/18/2023  6:56 AM BY MARGARITA SOSA FNP    Controlled.  Continue sertraline - refilled today.  Read positive daily meditations, avoid negative media, set healthy boundaries.   Exercise daily, keep consistent sleep pattern, eat a healthy diet.   Establish good social support, make changes to reduce stress.   Do not drink alcohol or use illicit drugs.   Reports any symptoms of suicidal/homicidal ideations or self harm immediately, go to nearest emergency room.

## 2023-08-18 NOTE — ASSESSMENT & PLAN NOTE
Physical activity 5 times a week for 30 minutes per day (or 150 minutes per week).   Avoid activities than increase pain or stiffness.   Apply heating pad, ice pack, and BioFreeze/topical capsaicin as needed; alternate every 15-20 minutes.   Continue NSAID/muscle relaxer as needed.   Refill diclofenac gel - take as prescribed.

## 2023-08-18 NOTE — ASSESSMENT & PLAN NOTE
Wellness labs - 8/14/2023  Breast Cancer Screening - 8/17/2022: MMG-Negative. Continue annual screenings. (Scheduled 8/18/2023).  Cervical Cancer Screening- PAP 4/28/2023 - NIL. Follow up with GYN annually for Pelvic/Pap.  LDCT - Declines.  Osteoporosis Screening - Declines.  Colon Cancer Screening - No prior testing. Cologuard ordered, has not returned. Extensive conversation in regards to returning Cologuard ASAP. Reordered on 8/17/2023.  Vaccines: Flu 12/16/2022 / Pneumonia 8/7/2018, 12/16/2022 / Tetanus 7/22/2019 / Shingles #1 - 4/17/2023, #2 - 8/17/2023

## 2023-08-18 NOTE — ASSESSMENT & PLAN NOTE
Body mass index is 33.8 kg/m².  Goal BMI <30.  Aerobic exercise 150 minutes per week.  Avoid soda, simple sugars, sweets, excessive rice, pasta, potatoes or bread.   Choose brown options when available and portion control.  Limit fast foods and fried foods.   Choose complex carbs in moderation (ex: green, leafy vegetables, beans, oatmeal).  Eat plenty of fresh fruits and vegetables with lean meats daily.   Consider permanent healthy lifestyle changes.

## 2023-10-23 DIAGNOSIS — R06.2 WHEEZING: ICD-10-CM

## 2023-11-03 RX ORDER — ALBUTEROL SULFATE 90 UG/1
2 AEROSOL, METERED RESPIRATORY (INHALATION) EVERY 6 HOURS PRN
Qty: 18 G | Refills: 1 | Status: SHIPPED | OUTPATIENT
Start: 2023-11-03 | End: 2024-02-06 | Stop reason: SDUPTHER

## 2023-11-20 PROBLEM — Z00.00 WELL ADULT EXAM: Status: RESOLVED | Noted: 2023-08-17 | Resolved: 2023-11-20

## 2023-12-14 DIAGNOSIS — R06.2 WHEEZING: ICD-10-CM

## 2023-12-14 RX ORDER — ALBUTEROL SULFATE 90 UG/1
2 AEROSOL, METERED RESPIRATORY (INHALATION) EVERY 6 HOURS PRN
Qty: 18 G | Refills: 1 | Status: CANCELLED | OUTPATIENT
Start: 2023-12-14 | End: 2024-12-13

## 2024-02-05 ENCOUNTER — PATIENT MESSAGE (OUTPATIENT)
Dept: INTERNAL MEDICINE | Facility: CLINIC | Age: 49
End: 2024-02-05
Payer: MEDICAID

## 2024-02-05 DIAGNOSIS — R06.2 WHEEZING: ICD-10-CM

## 2024-02-05 DIAGNOSIS — K21.9 GASTROESOPHAGEAL REFLUX DISEASE WITHOUT ESOPHAGITIS: ICD-10-CM

## 2024-02-06 RX ORDER — PANTOPRAZOLE SODIUM 40 MG/1
40 TABLET, DELAYED RELEASE ORAL DAILY
Qty: 90 TABLET | Refills: 1 | Status: SHIPPED | OUTPATIENT
Start: 2024-02-06 | End: 2024-02-16 | Stop reason: SDUPTHER

## 2024-02-06 RX ORDER — ALBUTEROL SULFATE 90 UG/1
2 AEROSOL, METERED RESPIRATORY (INHALATION) EVERY 6 HOURS PRN
Qty: 18 G | Refills: 1 | Status: SHIPPED | OUTPATIENT
Start: 2024-02-06 | End: 2024-02-16 | Stop reason: SDUPTHER

## 2024-02-09 ENCOUNTER — LAB VISIT (OUTPATIENT)
Dept: LAB | Facility: HOSPITAL | Age: 49
End: 2024-02-09
Payer: MEDICAID

## 2024-02-09 DIAGNOSIS — E78.1 HYPERTRIGLYCERIDEMIA: ICD-10-CM

## 2024-02-09 DIAGNOSIS — I10 PRIMARY HYPERTENSION: ICD-10-CM

## 2024-02-09 LAB
ALBUMIN SERPL-MCNC: 4 G/DL (ref 3.5–5)
ALBUMIN/GLOB SERPL: 1.3 RATIO (ref 1.1–2)
ALP SERPL-CCNC: 61 UNIT/L (ref 40–150)
ALT SERPL-CCNC: 20 UNIT/L (ref 0–55)
APPEARANCE UR: ABNORMAL
AST SERPL-CCNC: 16 UNIT/L (ref 5–34)
BACTERIA #/AREA URNS AUTO: ABNORMAL /HPF
BASOPHILS # BLD AUTO: 0.04 X10(3)/MCL
BASOPHILS NFR BLD AUTO: 0.4 %
BILIRUB SERPL-MCNC: 0.5 MG/DL
BILIRUB UR QL STRIP.AUTO: NEGATIVE
BUN SERPL-MCNC: 19.2 MG/DL (ref 9.8–20.1)
CALCIUM SERPL-MCNC: 9.6 MG/DL (ref 8.4–10.2)
CAOX CRY URNS QL MICRO: ABNORMAL /HPF
CHLORIDE SERPL-SCNC: 107 MMOL/L (ref 98–107)
CHOLEST SERPL-MCNC: 170 MG/DL
CHOLEST/HDLC SERPL: 4 {RATIO} (ref 0–5)
CO2 SERPL-SCNC: 27 MMOL/L (ref 22–29)
COLOR UR AUTO: YELLOW
CREAT SERPL-MCNC: 0.84 MG/DL (ref 0.55–1.02)
CREAT UR-MCNC: 156.3 MG/DL (ref 45–106)
EOSINOPHIL # BLD AUTO: 0.11 X10(3)/MCL (ref 0–0.9)
EOSINOPHIL NFR BLD AUTO: 1.1 %
ERYTHROCYTE [DISTWIDTH] IN BLOOD BY AUTOMATED COUNT: 12.4 % (ref 11.5–17)
GFR SERPLBLD CREATININE-BSD FMLA CKD-EPI: >60 MLS/MIN/1.73/M2
GLOBULIN SER-MCNC: 3.1 GM/DL (ref 2.4–3.5)
GLUCOSE SERPL-MCNC: 84 MG/DL (ref 74–100)
GLUCOSE UR QL STRIP.AUTO: NORMAL
HCT VFR BLD AUTO: 43.8 % (ref 37–47)
HDLC SERPL-MCNC: 43 MG/DL (ref 35–60)
HGB BLD-MCNC: 14.7 G/DL (ref 12–16)
HYALINE CASTS #/AREA URNS LPF: ABNORMAL /LPF
IMM GRANULOCYTES # BLD AUTO: 0.02 X10(3)/MCL (ref 0–0.04)
IMM GRANULOCYTES NFR BLD AUTO: 0.2 %
KETONES UR QL STRIP.AUTO: NEGATIVE
LDLC SERPL CALC-MCNC: 95 MG/DL (ref 50–140)
LEUKOCYTE ESTERASE UR QL STRIP.AUTO: 25
LYMPHOCYTES # BLD AUTO: 3.92 X10(3)/MCL (ref 0.6–4.6)
LYMPHOCYTES NFR BLD AUTO: 39.7 %
MCH RBC QN AUTO: 31.3 PG (ref 27–31)
MCHC RBC AUTO-ENTMCNC: 33.6 G/DL (ref 33–36)
MCV RBC AUTO: 93.2 FL (ref 80–94)
MICROALBUMIN UR-MCNC: 10.8 UG/ML
MICROALBUMIN/CREAT RATIO PNL UR: 6.9 MG/GM CR (ref 0–30)
MONOCYTES # BLD AUTO: 0.68 X10(3)/MCL (ref 0.1–1.3)
MONOCYTES NFR BLD AUTO: 6.9 %
MUCOUS THREADS URNS QL MICRO: ABNORMAL /LPF
NEUTROPHILS # BLD AUTO: 5.1 X10(3)/MCL (ref 2.1–9.2)
NEUTROPHILS NFR BLD AUTO: 51.7 %
NITRITE UR QL STRIP.AUTO: NEGATIVE
NRBC BLD AUTO-RTO: 0 %
PH UR STRIP.AUTO: 6 [PH]
PLATELET # BLD AUTO: 223 X10(3)/MCL (ref 130–400)
PMV BLD AUTO: 10.4 FL (ref 7.4–10.4)
POTASSIUM SERPL-SCNC: 3.8 MMOL/L (ref 3.5–5.1)
PROT SERPL-MCNC: 7.1 GM/DL (ref 6.4–8.3)
PROT UR QL STRIP.AUTO: ABNORMAL
RBC # BLD AUTO: 4.7 X10(6)/MCL (ref 4.2–5.4)
RBC #/AREA URNS AUTO: ABNORMAL /HPF
RBC UR QL AUTO: NEGATIVE
SODIUM SERPL-SCNC: 140 MMOL/L (ref 136–145)
SP GR UR STRIP.AUTO: 1.02 (ref 1–1.03)
SQUAMOUS #/AREA URNS LPF: ABNORMAL /HPF
TRIGL SERPL-MCNC: 161 MG/DL (ref 37–140)
UROBILINOGEN UR STRIP-ACNC: NORMAL
VLDLC SERPL CALC-MCNC: 32 MG/DL
WBC # SPEC AUTO: 9.87 X10(3)/MCL (ref 4.5–11.5)
WBC #/AREA URNS AUTO: ABNORMAL /HPF

## 2024-02-09 PROCEDURE — 80053 COMPREHEN METABOLIC PANEL: CPT

## 2024-02-09 PROCEDURE — 82043 UR ALBUMIN QUANTITATIVE: CPT

## 2024-02-09 PROCEDURE — 36415 COLL VENOUS BLD VENIPUNCTURE: CPT

## 2024-02-09 PROCEDURE — 80061 LIPID PANEL: CPT

## 2024-02-09 PROCEDURE — 81001 URINALYSIS AUTO W/SCOPE: CPT

## 2024-02-09 PROCEDURE — 85025 COMPLETE CBC W/AUTO DIFF WBC: CPT

## 2024-02-16 ENCOUNTER — OFFICE VISIT (OUTPATIENT)
Dept: INTERNAL MEDICINE | Facility: CLINIC | Age: 49
End: 2024-02-16
Payer: MEDICAID

## 2024-02-16 DIAGNOSIS — R06.2 WHEEZING: ICD-10-CM

## 2024-02-16 DIAGNOSIS — Z00.00 WELL ADULT EXAM: ICD-10-CM

## 2024-02-16 DIAGNOSIS — R73.01 IMPAIRED FASTING GLUCOSE: ICD-10-CM

## 2024-02-16 DIAGNOSIS — Z13.21 ENCOUNTER FOR VITAMIN DEFICIENCY SCREENING: ICD-10-CM

## 2024-02-16 DIAGNOSIS — E78.1 HYPERTRIGLYCERIDEMIA: ICD-10-CM

## 2024-02-16 DIAGNOSIS — F41.9 ANXIETY AND DEPRESSION: Primary | ICD-10-CM

## 2024-02-16 DIAGNOSIS — G47.33 OSA (OBSTRUCTIVE SLEEP APNEA): ICD-10-CM

## 2024-02-16 DIAGNOSIS — K21.9 GASTROESOPHAGEAL REFLUX DISEASE WITHOUT ESOPHAGITIS: ICD-10-CM

## 2024-02-16 DIAGNOSIS — Z13.0 SCREENING FOR IRON DEFICIENCY ANEMIA: ICD-10-CM

## 2024-02-16 DIAGNOSIS — F33.42 RECURRENT MAJOR DEPRESSIVE DISORDER, IN FULL REMISSION: ICD-10-CM

## 2024-02-16 DIAGNOSIS — N32.81 OAB (OVERACTIVE BLADDER): ICD-10-CM

## 2024-02-16 DIAGNOSIS — F32.A ANXIETY AND DEPRESSION: Primary | ICD-10-CM

## 2024-02-16 DIAGNOSIS — Z13.29 SCREENING FOR THYROID DISORDER: ICD-10-CM

## 2024-02-16 PROCEDURE — 1159F MED LIST DOCD IN RCRD: CPT | Mod: CPTII,95,,

## 2024-02-16 PROCEDURE — 1160F RVW MEDS BY RX/DR IN RCRD: CPT | Mod: CPTII,95,,

## 2024-02-16 PROCEDURE — 3066F NEPHROPATHY DOC TX: CPT | Mod: CPTII,95,,

## 2024-02-16 PROCEDURE — 3061F NEG MICROALBUMINURIA REV: CPT | Mod: CPTII,95,,

## 2024-02-16 PROCEDURE — 99214 OFFICE O/P EST MOD 30 MIN: CPT | Mod: 95,,,

## 2024-02-16 RX ORDER — BUSPIRONE HYDROCHLORIDE 7.5 MG/1
7.5 TABLET ORAL 2 TIMES DAILY
Qty: 75 TABLET | Refills: 0 | Status: SHIPPED | OUTPATIENT
Start: 2024-02-16 | End: 2024-03-15 | Stop reason: SDUPTHER

## 2024-02-16 RX ORDER — PANTOPRAZOLE SODIUM 40 MG/1
40 TABLET, DELAYED RELEASE ORAL DAILY
Qty: 90 TABLET | Refills: 1 | Status: SHIPPED | OUTPATIENT
Start: 2024-02-16 | End: 2025-02-15

## 2024-02-16 RX ORDER — SERTRALINE HYDROCHLORIDE 100 MG/1
200 TABLET, FILM COATED ORAL DAILY
Qty: 180 TABLET | Refills: 1 | Status: SHIPPED | OUTPATIENT
Start: 2024-02-16 | End: 2025-02-15

## 2024-02-16 RX ORDER — OXYBUTYNIN CHLORIDE 5 MG/1
5 TABLET ORAL 2 TIMES DAILY
Qty: 180 TABLET | Refills: 1 | Status: SHIPPED | OUTPATIENT
Start: 2024-02-16 | End: 2025-02-15

## 2024-02-16 RX ORDER — FENOFIBRATE 160 MG/1
160 TABLET ORAL DAILY
Qty: 90 TABLET | Refills: 1 | Status: SHIPPED | OUTPATIENT
Start: 2024-02-16 | End: 2025-02-15

## 2024-02-16 RX ORDER — ALBUTEROL SULFATE 90 UG/1
2 AEROSOL, METERED RESPIRATORY (INHALATION) EVERY 6 HOURS PRN
Qty: 18 G | Refills: 1 | Status: SHIPPED | OUTPATIENT
Start: 2024-02-16 | End: 2024-04-09 | Stop reason: SDUPTHER

## 2024-02-16 RX ORDER — PRAVASTATIN SODIUM 40 MG/1
40 TABLET ORAL NIGHTLY
Qty: 90 TABLET | Refills: 3 | Status: SHIPPED | OUTPATIENT
Start: 2024-02-16 | End: 2025-02-15

## 2024-02-16 NOTE — ASSESSMENT & PLAN NOTE
Avoid spicy, acidic, fried food and alcohol.    Eat 2-3 hours before bed.   Avoid tight fitting clothes and chew food thoroughly.    Reduce caffeine intake, avoid soda.    Take pantoprazole as prescribed - refilled today.    Encouraged continued smoking cessation.

## 2024-02-16 NOTE — ASSESSMENT & PLAN NOTE
Lab Results   Component Value Date    LDL 95.00 02/09/2024       Lab Results   Component Value Date    TRIG 161 (H) 02/09/2024       Lab Results   Component Value Date    HDL 43 02/09/2024        Lab Results   Component Value Date    CHOL 170 02/09/2024   Continue pravastatin and fenofibrate - refilled today.  Follow a low cholesterol, low saturated fat diet with less than 200 mg of cholesterol a day.   Avoid fried foods and high saturated fats.  Add flax seed or fish oil supplements to diet.   Increase dietary fiber.   Regular exercise improves cholesterol levels.  Physical activity 5 times a week for 30 minutes per day (or 150 minutes per week).   Stressed importance of dietary modifications.

## 2024-02-16 NOTE — PATIENT INSTRUCTIONS
REMINDER: Please complete labs within 1 week of appointment.   Please complete satisfaction survey when received. Thank you.    Bernardo Mills,     If you are due for any health screening(s) below please notify me so we can arrange them to be ordered and scheduled. Most healthy patients at your age complete them, but you are free to accept or refuse.     If you can't do it, I'll definitely understand. If you can, I'd certainly appreciate it!    Tests to Keep You Healthy    Mammogram: Met on 8/18/2023  Colon Cancer Screening: ORDERED  Cervical Cancer Screening: Met on 4/28/2023  Last Blood Pressure <= 139/89 (8/17/2023): Yes      Its time for your colon cancer screening     Colorectal cancer is one of the leading causes of cancer death for men and women but it doesnt have to be. Screenings can prevent colorectal cancer or find it early enough to treat and cure the disease.     Our records indicate that you may be overdue for colon cancer screening. A colonoscopy or stool screening test can help identify patients at risk for developing colon cancer. Cancer screenings save lives, so schedule yours today to stay healthy.     A colonoscopy is the preferred test for detecting colon cancer. It is needed only once every 10 years if results are negative. While you are sedated, a flexible, lighted tube with a tiny camera is inserted into the rectum and advanced through the colon to look for cancers.     An alternative screening test that is used at home and returned to the lab may also be used. It detects hidden blood in bowel movements which could indicate cancer in the colon. If results are positive, you will need a colonoscopy to determine if the blood is a sign of cancer. This type of follow up (diagnostic) colonoscopy usually requires additional copays as required by your insurance provider.     If you recently had your colon cancer screening performed outside of [facility], please let your Health care team know  so that they can update your health record. Please contact your PCP if you have any questions.

## 2024-02-16 NOTE — PROGRESS NOTES
VISIT DATE: 24    PATIENT NAME: Lina Kat  : 1975  MRN: [id number]     The patient location is: [place], [place]  The chief complaint leading to consultation is: Lab review, anxiety.    Visit type: audiovisual    Face to Face time with patient: 15 minutes  36 minutes of total time spent on the encounter, which includes face to face time and non-face to face time preparing to see the patient (eg, review of tests), Obtaining and/or reviewing separately obtained history, Documenting clinical information in the electronic or other health record, Independently interpreting results (not separately reported) and communicating results to the patient/family/caregiver, or Care coordination (not separately reported).     Each patient to whom he or she provides medical services by telemedicine is:  (1) informed of the relationship between the physician and patient and the respective role of any other health care provider with respect to management of the patient; and (2) notified that he or she may decline to receive medical services by telemedicine and may withdraw from such care at any time.    Reason for visit / Chief Complaint:  Labs Only and Anxiety       History of Present Illness (HPI):  Lina Kat is a 49 y.o. White female presenting virtually by audio/visual for Labs Only and Anxiety.  PMH of anxiety/depression (controlled), HTN, UBALDO with CPAP, tobacco use, VSG (3/1/2023 with Dr. Zhou Rubio). On 3/29/2023, she had an excisional biopsy of lipoma of right knee. She is followed by [facility] orthopedic and GYN clinics.     All pertinent labs dated 2024 reviewed and discussed with patient.     PHQ2-2 and GAD7-16. Denies SI/HI. Patient reports 2 anxiety attacks in the last 3 months. She thinks the last attack was brought on by large crowds with [employer].      Quit smoking in 2022.. Denies alcohol or illicit drug use. Denies chest pain, shortness of breath, cough, headache, dizziness, weakness, abdominal  pain, nausea, vomiting, diarrhea, constipation, dysuria, depression, anxiety, SI, and HI.    Breast Cancer Screening - 8/17/2022: MMG-Negative. Continue annual screenings. (Scheduled 8/18/2023).  Cervical Cancer Screening- PAP 4/28/2023 - NIL. Follow up with GYN annually for Pelvic/Pap.  LDCT - Declines.  Osteoporosis Screening - Declines.  Colon Cancer Screening - No prior testing. Cologuard ordered, has not returned. Another extensive conversation in regards to returning Cologuard ASAP. On the virtual visit, pt removed cologuard from closet and states she will place in the bathroom to complete.   Vaccines: Flu 12/16/2022 / Pneumonia 8/7/2018, 12/16/2022 / Tetanus 7/22/2019 / Shingles #1 - 4/17/2023, #2 - 8/17/2023      Review of Systems     Review of Systems   Constitutional:  Positive for unexpected weight change. Negative for activity change.   HENT:  Negative for hearing loss, rhinorrhea and trouble swallowing.    Eyes:  Negative for discharge and visual disturbance.   Respiratory:  Positive for chest tightness. Negative for wheezing.    Cardiovascular:  Positive for chest pain and palpitations.   Gastrointestinal:  Negative for blood in stool, constipation, diarrhea and vomiting.   Endocrine: Negative for polydipsia and polyuria.   Genitourinary:  Negative for difficulty urinating, dysuria, hematuria and menstrual problem.   Musculoskeletal:  Negative for arthralgias, joint swelling and neck pain.   Neurological:  Negative for weakness and headaches.   Psychiatric/Behavioral:  Negative for confusion and dysphoric mood.        Medical / Social / Family History     Past Medical History:   Diagnosis Date    Anxiety     Depression     Hyperlipidemia     Hypertension     UBALDO (obstructive sleep apnea)          Past Surgical History:   Procedure Laterality Date    CHOLECYSTECTOMY      gastric sleeve  03/01/2023    SURGICAL REMOVAL OF MASS OF LOWER EXTREMITY Right 3/29/2023    Procedure: EXCISION, MASS, LOWER  EXTREMITY;  Surgeon: Cody Joseph MD;  Location: [facility] ER;  Service: General;  Laterality: Right;  Right lower extremity/ anterior knee    TUBAL LIGATION           Social History  Lina Kat's  reports that she quit smoking about 14 months ago. Her smoking use included cigarettes. She started smoking about 16 years ago. She has a 15.0 pack-year smoking history. She has never used smokeless tobacco. She reports that she does not drink alcohol and does not use drugs.    Family History  Lina Kat's family history includes Brain cancer in her maternal aunt and paternal uncle; Diabetes type II in her father; Heart attack in her father; Hypertension in her brother and mother.    Medications and Allergies     Medications  Current Outpatient Medications   Medication Instructions    albuterol (PROVENTIL/VENTOLIN HFA) 90 mcg/actuation inhaler 2 puffs, Inhalation, Every 6 hours PRN    busPIRone (BUSPAR) 7.5 mg, Oral, 2 times daily    diclofenac sodium (VOLTAREN) 4 g, Topical (Top), 3 times daily PRN    fenofibrate 160 mg, Oral, Daily    oxybutynin (DITROPAN) 5 mg, Oral, 2 times daily    pantoprazole (PROTONIX) 40 mg, Oral, Daily    pravastatin (PRAVACHOL) 40 mg, Oral, Nightly    sertraline (ZOLOFT) 200 mg, Oral, Daily        Allergies  Review of patient's allergies indicates:   Allergen Reactions    Codeine Swelling       Physical Examination   No vitals due to virtual visit.  Physical Exam      Results     Lab Results   Component Value Date    WBC 9.87 02/09/2024    RBC 4.70 02/09/2024    HGB 14.7 02/09/2024    HCT 43.8 02/09/2024    MCV 93.2 02/09/2024    MCH 31.3 (H) 02/09/2024    MCHC 33.6 02/09/2024    RDW 12.4 02/09/2024     02/09/2024    MPV 10.4 02/09/2024     Lab Results   Component Value Date     02/09/2024    K 3.8 02/09/2024    CO2 27 02/09/2024    BUN 19.2 02/09/2024    CREATININE 0.84 02/09/2024    CALCIUM 9.6 02/09/2024    ALBUMIN 4.0 02/09/2024    BILITOT 0.5 02/09/2024    ALKPHOS 61  "02/09/2024    AST 16 02/09/2024    ALT 20 02/09/2024    ANIONGAP 5 02/10/2023    EGFRIFAFRICA 85 (L) 08/01/2021    EGFRNONAA >60 05/24/2022     Lab Results   Component Value Date    TSH 2.389 08/14/2023     Lab Results   Component Value Date    CHOL 170 02/09/2024    HDL 43 02/09/2024    LDL 95.00 02/09/2024    TRIG 161 (H) 02/09/2024     Lab Results   Component Value Date    COLORUA Yellow 02/09/2024    SGUA 1.024 02/09/2024    PROTEINUA Trace (A) 02/09/2024    GLUCOSEUA Normal 02/09/2024    BILIRUBINUA Negative 02/09/2024    BLOODUA Negative 02/09/2024    RBCUA 0-5 02/09/2024    BACTERIA Trace (A) 02/09/2024    NITRITE Negative 08/01/2021    LEUKOCYTESUR 25 (A) 02/09/2024    UROBILINOGEN Normal 02/09/2024      Lab Results   Component Value Date    CREATRANDUR 156.3 (H) 02/09/2024    MICALBCREAT 6.9 02/09/2024     Lab Results   Component Value Date    AXLWWFFV75CE 82.6 (H) 08/14/2023     Lab Results   Component Value Date    HIV Nonreactive 04/14/2023    HEPAIGM Nonreactive 04/14/2023    HEPBCOREM Nonreactive 04/14/2023    HEPCAB Nonreactive 04/14/2023     No results found for: "FITDIAG", "COLOGUARD"      Assessment and Plan (including Health Maintenance)     Problem List Items Addressed This Visit          Psychiatric    Anxiety and depression - Primary    Current Assessment & Plan         2/16/2024     9:14 AM 4/28/2023    10:41 AM 4/11/2023    12:36 PM 3/23/2023    10:42 AM 5/25/2022    12:30 PM   Depression Patient Health Questionnaire   Over the last two weeks how often have you been bothered by little interest or pleasure in doing things Several days Not at all Not at all Not at all Not at all   Over the last two weeks how often have you been bothered by feeling down, depressed or hopeless Several days Not at all Not at all Not at all Not at all   PHQ-2 Total Score 2 0 0 0 0   SPRING-7 Score: 6  Interpretation: Mild Anxiety   Denies SI/HI  Rx buspirone.  Continue sertraline as prescribed.  Virtual visit in 4 " weeks for re-evaluation.  Read positive daily meditations, avoid negative media, set healthy boundaries.   Exercise daily, keep consistent sleep pattern, eat a healthy diet.   Establish good social support, make changes to reduce stress.   Do not drink alcohol or use illicit drugs.   Reports any symptoms of suicidal/homicidal ideations or self harm immediately, go to nearest emergency room.           Relevant Medications    busPIRone (BUSPAR) 7.5 MG tablet       Pulmonary    Wheezing    Current Assessment & Plan     Symptoms controlled with albuterol - refilled today.           Relevant Medications    albuterol (PROVENTIL/VENTOLIN HFA) 90 mcg/actuation inhaler       Cardiac/Vascular    Hypertriglyceridemia    Current Assessment & Plan     Lab Results   Component Value Date    LDL 95.00 02/09/2024       Lab Results   Component Value Date    TRIG 161 (H) 02/09/2024       Lab Results   Component Value Date    HDL 43 02/09/2024      Lab Results   Component Value Date    CHOL 170 02/09/2024   Continue pravastatin and fenofibrate - refilled today.  Follow a low cholesterol, low saturated fat diet with less than 200 mg of cholesterol a day.   Avoid fried foods and high saturated fats.  Add flax seed or fish oil supplements to diet.   Increase dietary fiber.   Regular exercise improves cholesterol levels.  Physical activity 5 times a week for 30 minutes per day (or 150 minutes per week).   Stressed importance of dietary modifications.           Relevant Medications    fenofibrate 160 MG Tab    pravastatin (PRAVACHOL) 40 MG tablet    Other Relevant Orders    Lipid Panel       Renal/    OAB (overactive bladder)    Current Assessment & Plan     Symptoms controlled with oxybutynin 5 mg twice daily - refilled today..  Timed voiding every 2-3 hours.   Avoidance of bladder irritants such as alcohol citrus foods & drinks, chocolates, caffeinated drinks, energy drinks, spicy foods, sodas.  Increase water intake.           Relevant  Medications    oxybutynin (DITROPAN) 5 MG Tab       Endocrine    Impaired fasting glucose    Relevant Orders    Hemoglobin A1C       GI    Gastroesophageal reflux disease without esophagitis    Current Assessment & Plan     Avoid spicy, acidic, fried food and alcohol.    Eat 2-3 hours before bed.   Avoid tight fitting clothes and chew food thoroughly.    Reduce caffeine intake, avoid soda.    Take pantoprazole as prescribed - refilled today.    Encouraged continued smoking cessation.           Relevant Medications    pantoprazole (PROTONIX) 40 MG tablet    Other Relevant Orders    Urinalysis, Reflex to Urine Culture       Other    UBALDO (obstructive sleep apnea)    Current Assessment & Plan     Reports compliance with wearing CPAP nightly. Reports decreased EDS, snoring and fatigue. Pt is benefiting from its use. Expect lifetime use and decreased daytime sleepiness/fatigue. Avoid excessive alcohol, smoking and overuse of sedatives at bedtime.           Other Visit Diagnoses       Recurrent major depressive disorder, in full remission        Relevant Medications    sertraline (ZOLOFT) 100 MG tablet    Well adult exam        Relevant Orders    Urinalysis, Reflex to Urine Culture    Microalbumin/Creatinine Ratio, Urine    Comprehensive Metabolic Panel    CBC Auto Differential    Encounter for vitamin deficiency screening        Relevant Orders    Vitamin D    Screening for thyroid disorder        Relevant Orders    TSH    T4, Free    Screening for iron deficiency anemia        Relevant Orders    Iron and TIBC    Ferritin               Health Maintenance Due   Topic Date Due    Colorectal Cancer Screening  Never done    Influenza Vaccine (1) 09/01/2023     Tests to Keep You Healthy    Mammogram: Met on 8/18/2023  Colon Cancer Screening: ORDERED  Cervical Cancer Screening: Met on 4/28/2023  Last Blood Pressure <= 139/89 (8/17/2023): Yes        Health Maintenance Topics with due status: Not Due       Topic Last Completion  Date    TETANUS VACCINE 07/22/2019    Cervical Cancer Screening 04/28/2023    Hemoglobin A1c (Prediabetes) 08/14/2023    Mammogram 08/18/2023    Lipid Panel 02/09/2024       Future Appointments   Date Time Provider Department Center   5/3/2024  9:30 AM Faina Weinstein, ANP [facility] G[facility] [place]        Follow up in about 4 weeks (around 3/15/2024) for Virtual Visit, No labs due, Anxiety, Depression, RTC PRN.           Signature:  RAMIRO Rodgers  [facility]  [address] [facility] [id number]    Date of encounter: 2/16/24

## 2024-03-14 LAB — NONINV COLON CA DNA+OCC BLD SCRN STL QL: NORMAL

## 2024-03-15 ENCOUNTER — OFFICE VISIT (OUTPATIENT)
Dept: INTERNAL MEDICINE | Facility: CLINIC | Age: 49
End: 2024-03-15
Payer: MEDICAID

## 2024-03-15 DIAGNOSIS — F41.9 ANXIETY AND DEPRESSION: Primary | ICD-10-CM

## 2024-03-15 DIAGNOSIS — Z12.31 BREAST CANCER SCREENING BY MAMMOGRAM: ICD-10-CM

## 2024-03-15 DIAGNOSIS — F32.A ANXIETY AND DEPRESSION: Primary | ICD-10-CM

## 2024-03-15 PROCEDURE — 3061F NEG MICROALBUMINURIA REV: CPT | Mod: CPTII,95,,

## 2024-03-15 PROCEDURE — 1159F MED LIST DOCD IN RCRD: CPT | Mod: CPTII,95,,

## 2024-03-15 PROCEDURE — 99213 OFFICE O/P EST LOW 20 MIN: CPT | Mod: 95,,,

## 2024-03-15 PROCEDURE — 1160F RVW MEDS BY RX/DR IN RCRD: CPT | Mod: CPTII,95,,

## 2024-03-15 PROCEDURE — 3066F NEPHROPATHY DOC TX: CPT | Mod: CPTII,95,,

## 2024-03-15 RX ORDER — BUSPIRONE HYDROCHLORIDE 7.5 MG/1
7.5 TABLET ORAL 2 TIMES DAILY
Qty: 180 TABLET | Refills: 2 | Status: SHIPPED | OUTPATIENT
Start: 2024-03-15 | End: 2025-03-15

## 2024-03-15 NOTE — ASSESSMENT & PLAN NOTE
3/15/2024     7:55 AM 2/16/2024     9:14 AM 4/28/2023    10:41 AM 4/11/2023    12:36 PM 3/23/2023    10:42 AM 5/25/2022    12:30 PM   Depression Patient Health Questionnaire   Over the last two weeks how often have you been bothered by little interest or pleasure in doing things Not at all Several days Not at all Not at all Not at all Not at all   Over the last two weeks how often have you been bothered by feeling down, depressed or hopeless Not at all Several days Not at all Not at all Not at all Not at all   PHQ-2 Total Score 0 2 0 0 0 0   Over the last two weeks how often have you been bothered by trouble falling or staying asleep, or sleeping too much Not at all        Over the last two weeks how often have you been bothered by feeling tired or having little energy Not at all        Over the last two weeks how often have you been bothered by a poor appetite or overeating Not at all        Over the last two weeks how often have you been bothered by feeling bad about yourself - or that you are a failure or have let yourself or your family down Not at all        Over the last two weeks how often have you been bothered by trouble concentrating on things, such as reading the newspaper or watching television Not at all        Over the last two weeks how often have you been bothered by moving or speaking so slowly that other people could have noticed. Or the opposite - being so fidgety or restless that you have been moving around a lot more than usual. Not at all        Over the last two weeks how often have you been bothered by thoughts that you would be better off dead, or of hurting yourself Not at all        If you checked off any problems, how difficult have these problems made it for you to do your work, take care of things at home or get along with other people? Not difficult at all        PHQ-9 Score 0        PHQ-9 Interpretation Minimal or None          SPRING-7 Score: 0  Interpretation: Normal   Denies  SI/HI  Continue sertraline and buspirone as prescribed.  Read positive daily meditations, avoid negative media, set healthy boundaries.   Exercise daily, keep consistent sleep pattern, eat a healthy diet.   Establish good social support, make changes to reduce stress.   Do not drink alcohol or use illicit drugs.   Reports any symptoms of suicidal/homicidal ideations or self harm immediately, go to nearest emergency room.

## 2024-03-15 NOTE — PROGRESS NOTES
VISIT DATE: 3/15/24    PATIENT NAME: Lina Kat  : 1975  MRN: [id number]     The patient location is: [place], [place]  The chief complaint leading to consultation is: Anxiety and Depression     Visit type: audiovisual    Visual time with patient: 10 minutes  20 minutes of total time spent on the encounter, which includes face to face time and non-face to face time preparing to see the patient (eg, review of tests), Obtaining and/or reviewing separately obtained history, Documenting clinical information in the electronic or other health record, Independently interpreting results (not separately reported) and communicating results to the patient/family/caregiver, or Care coordination (not separately reported).     Each patient to whom he or she provides medical services by telemedicine is:  (1) informed of the relationship between the physician and patient and the respective role of any other health care provider with respect to management of the patient; and (2) notified that he or she may decline to receive medical services by telemedicine and may withdraw from such care at any time.    Reason for visit / Chief Complaint:  Follow-up, Anxiety, and Depression       History of Present Illness (HPI):  Lina Kat is a 49 y.o. White female presenting virtually by audio/visual for Follow-up, Anxiety, and Depression. PMH of anxiety/depression (controlled), HTN, UBALDO with CPAP, tobacco use, VSG (3/1/2023 with Dr. Zhou Rubio). On 3/29/2023, she had an excisional biopsy of lipoma of right knee. She is followed by [facility] orthopedic and GYN clinics.     PHQ2-2 and GAD7-16. Denies SI/HI. Patient reports 2 anxiety attacks in the last 3 months. She thinks the last attack was brought on by large crowds with [employer]. She was to continue sertraline as prescribed and buspirone was ordered. Today, PHQ9-0 and GAD7-0.      Quit smoking in 2022.. Denies alcohol or illicit drug use. Denies chest pain, shortness of breath, cough,  headache, dizziness, weakness, abdominal pain, nausea, vomiting, diarrhea, constipation, dysuria, depression, anxiety, SI, and HI.    Breast Cancer Screening - 8/18/2023: MMG-Negative. Continue annual screenings.   Cervical Cancer Screening- PAP 4/28/2023 - NIL. Follow up with GYN annually for Pelvic/Pap.  LDCT - Declines.  Osteoporosis Screening - Declines.  Colon Cancer Screening - No prior testing. Cologuard ordered, has not returned. Vaccines: Flu 12/16/2022 / Pneumonia 8/7/2018, 12/16/2022 / Tetanus 7/22/2019 / Shingles #1 - 4/17/2023, #2 - 8/17/2023      Review of Systems     Review of Systems   Constitutional:  Negative for activity change and unexpected weight change.   HENT:  Negative for hearing loss, rhinorrhea and trouble swallowing.    Eyes:  Negative for discharge and visual disturbance.   Respiratory:  Negative for chest tightness and wheezing.    Cardiovascular:  Negative for chest pain and palpitations.   Gastrointestinal:  Negative for blood in stool, constipation, diarrhea and vomiting.   Endocrine: Negative for polydipsia and polyuria.   Genitourinary:  Negative for difficulty urinating, dysuria, hematuria and menstrual problem.   Musculoskeletal:  Negative for arthralgias, joint swelling and neck pain.   Neurological:  Negative for weakness and headaches.   Psychiatric/Behavioral:  Negative for confusion and dysphoric mood.        Medical / Social / Family History     Past Medical History:   Diagnosis Date    Anxiety     Depression     Hyperlipidemia     Hypertension     UBALDO (obstructive sleep apnea)          Past Surgical History:   Procedure Laterality Date    CHOLECYSTECTOMY      gastric sleeve  03/01/2023    SURGICAL REMOVAL OF MASS OF LOWER EXTREMITY Right 3/29/2023    Procedure: EXCISION, MASS, LOWER EXTREMITY;  Surgeon: Cody Joseph MD;  Location: [facility];  Service: General;  Laterality: Right;  Right lower extremity/ anterior knee    TUBAL LIGATION           Social  History  Lina Kat's  reports that she quit smoking about 15 months ago. Her smoking use included cigarettes. She started smoking about 16 years ago. She has a 15.0 pack-year smoking history. She has never used smokeless tobacco. She reports that she does not drink alcohol and does not use drugs.    Family History  Lina Kat's family history includes Brain cancer in her maternal aunt and paternal uncle; Diabetes type II in her father; Heart attack in her father; Hypertension in her brother and mother.    Medications and Allergies     Medications  Current Outpatient Medications   Medication Instructions    albuterol (PROVENTIL/VENTOLIN HFA) 90 mcg/actuation inhaler 2 puffs, Inhalation, Every 6 hours PRN    busPIRone (BUSPAR) 7.5 mg, Oral, 2 times daily    diclofenac sodium (VOLTAREN) 4 g, Topical (Top), 3 times daily PRN    fenofibrate 160 mg, Oral, Daily    oxybutynin (DITROPAN) 5 mg, Oral, 2 times daily    pantoprazole (PROTONIX) 40 mg, Oral, Daily    pravastatin (PRAVACHOL) 40 mg, Oral, Nightly    sertraline (ZOLOFT) 200 mg, Oral, Daily        Allergies  Review of patient's allergies indicates:   Allergen Reactions    Codeine Swelling       Physical Examination   No vitals due to virtual visit.  Physical Exam  HENT:      Head: Normocephalic.      Nose: Nose normal.   Eyes:      Extraocular Movements: Extraocular movements intact.      Conjunctiva/sclera: Conjunctivae normal.      Pupils: Pupils are equal, round, and reactive to light.   Pulmonary:      Effort: Pulmonary effort is normal.   Musculoskeletal:      Cervical back: Normal range of motion.   Neurological:      General: No focal deficit present.      Mental Status: She is alert and oriented to person, place, and time.   Psychiatric:         Mood and Affect: Mood normal.         Behavior: Behavior normal.         Thought Content: Thought content normal.         Judgment: Judgment normal.           Results     Lab Results   Component Value Date    WBC  9.87 02/09/2024    RBC 4.70 02/09/2024    HGB 14.7 02/09/2024    HCT 43.8 02/09/2024    MCV 93.2 02/09/2024    MCH 31.3 (H) 02/09/2024    MCHC 33.6 02/09/2024    RDW 12.4 02/09/2024     02/09/2024    MPV 10.4 02/09/2024     Lab Results   Component Value Date     02/09/2024    K 3.8 02/09/2024    CO2 27 02/09/2024    BUN 19.2 02/09/2024    CREATININE 0.84 02/09/2024    CALCIUM 9.6 02/09/2024    ALBUMIN 4.0 02/09/2024    BILITOT 0.5 02/09/2024    ALKPHOS 61 02/09/2024    AST 16 02/09/2024    ALT 20 02/09/2024    ANIONGAP 5 02/10/2023    EGFRIFAFRICA 85 (L) 08/01/2021    EGFRNONAA >60 05/24/2022     Lab Results   Component Value Date    TSH 2.389 08/14/2023     Lab Results   Component Value Date    CHOL 170 02/09/2024    HDL 43 02/09/2024    LDL 95.00 02/09/2024    TRIG 161 (H) 02/09/2024     Lab Results   Component Value Date    COLORUA Yellow 02/09/2024    SGUA 1.024 02/09/2024    PROTEINUA Trace (A) 02/09/2024    GLUCOSEUA Normal 02/09/2024    BILIRUBINUA Negative 02/09/2024    BLOODUA Negative 02/09/2024    RBCUA 0-5 02/09/2024    BACTERIA Trace (A) 02/09/2024    NITRITE Negative 08/01/2021    LEUKOCYTESUR 25 (A) 02/09/2024    UROBILINOGEN Normal 02/09/2024      Lab Results   Component Value Date    CREATRANDUR 156.3 (H) 02/09/2024    MICALBCREAT 6.9 02/09/2024     Lab Results   Component Value Date    JJRHEJCG57YF 82.6 (H) 08/14/2023     Lab Results   Component Value Date    HIV Nonreactive 04/14/2023    HEPAIGM Nonreactive 04/14/2023    HEPBCOREM Nonreactive 04/14/2023    HEPCAB Nonreactive 04/14/2023     Lab Results   Component Value Date    COLOGUARD Sample Could Not Be Processed 3 03/13/2024         Assessment and Plan (including Health Maintenance)     Problem List Items Addressed This Visit          Psychiatric    Anxiety and depression - Primary    Current Assessment & Plan         3/15/2024     7:55 AM 2/16/2024     9:14 AM 4/28/2023    10:41 AM 4/11/2023    12:36 PM 3/23/2023    10:42 AM  5/25/2022    12:30 PM   Depression Patient Health Questionnaire   Over the last two weeks how often have you been bothered by little interest or pleasure in doing things Not at all Several days Not at all Not at all Not at all Not at all   Over the last two weeks how often have you been bothered by feeling down, depressed or hopeless Not at all Several days Not at all Not at all Not at all Not at all   PHQ-2 Total Score 0 2 0 0 0 0   Over the last two weeks how often have you been bothered by trouble falling or staying asleep, or sleeping too much Not at all        Over the last two weeks how often have you been bothered by feeling tired or having little energy Not at all        Over the last two weeks how often have you been bothered by a poor appetite or overeating Not at all        Over the last two weeks how often have you been bothered by feeling bad about yourself - or that you are a failure or have let yourself or your family down Not at all        Over the last two weeks how often have you been bothered by trouble concentrating on things, such as reading the newspaper or watching television Not at all        Over the last two weeks how often have you been bothered by moving or speaking so slowly that other people could have noticed. Or the opposite - being so fidgety or restless that you have been moving around a lot more than usual. Not at all        Over the last two weeks how often have you been bothered by thoughts that you would be better off dead, or of hurting yourself Not at all        If you checked off any problems, how difficult have these problems made it for you to do your work, take care of things at home or get along with other people? Not difficult at all        PHQ-9 Score 0        PHQ-9 Interpretation Minimal or None        SPRING-7 Score: 0  Interpretation: Normal   Denies SI/HI  Continue sertraline and buspirone as prescribed.  Read positive daily meditations, avoid negative media, set  healthy boundaries.   Exercise daily, keep consistent sleep pattern, eat a healthy diet.   Establish good social support, make changes to reduce stress.   Do not drink alcohol or use illicit drugs.   Reports any symptoms of suicidal/homicidal ideations or self harm immediately, go to nearest emergency room.           Relevant Medications    busPIRone (BUSPAR) 7.5 MG tablet       Renal/    Breast cancer screening by mammogram    Current Assessment & Plan     MMG ordered - due 8/19/2024.         Relevant Orders    Mammo Digital Screening Bilat w/ Gareth          Health Maintenance Due   Topic Date Due    Colorectal Cancer Screening  Never done    Influenza Vaccine (1) 09/01/2023     Tests to Keep You Healthy    Mammogram: Met on 8/18/2023  Colon Cancer Screening: DUE  Cervical Cancer Screening: Met on 4/28/2023  Last Blood Pressure <= 139/89 (8/17/2023): Yes        Health Maintenance Topics with due status: Not Due       Topic Last Completion Date    TETANUS VACCINE 07/22/2019    Cervical Cancer Screening 04/28/2023    Hemoglobin A1c (Prediabetes) 08/14/2023    Mammogram 08/18/2023    Lipid Panel 02/09/2024       Future Appointments   Date Time Provider Department Center   5/3/2024  9:30 AM Faina Weinstein, ANP [facility]        Follow up in about 5 months (around 8/15/2024).           Signature:  RAMIRO Rodgers  [facility]  [address] [facility] [id number]    Date of encounter: 3/15/24

## 2024-03-15 NOTE — PATIENT INSTRUCTIONS
REMINDER: Please complete labs within 1 week of appointment.   Please complete satisfaction survey when received. Thank you.

## 2024-04-09 DIAGNOSIS — R06.2 WHEEZING: ICD-10-CM

## 2024-04-09 RX ORDER — ALBUTEROL SULFATE 90 UG/1
2 AEROSOL, METERED RESPIRATORY (INHALATION) EVERY 6 HOURS PRN
Qty: 18 G | Refills: 4 | Status: SHIPPED | OUTPATIENT
Start: 2024-04-09 | End: 2025-04-09

## 2024-05-05 ENCOUNTER — PATIENT MESSAGE (OUTPATIENT)
Dept: INTERNAL MEDICINE | Facility: CLINIC | Age: 49
End: 2024-05-05
Payer: MEDICAID

## 2024-05-29 ENCOUNTER — TELEPHONE (OUTPATIENT)
Dept: INTERNAL MEDICINE | Facility: CLINIC | Age: 49
End: 2024-05-29
Payer: MEDICAID

## 2024-05-29 DIAGNOSIS — Z12.11 SCREENING FOR MALIGNANT NEOPLASM OF COLON: Primary | ICD-10-CM

## 2024-05-29 NOTE — TELEPHONE ENCOUNTER
Please inform Lina Kat:      Your health is very important to us. You were previously ordered a Cologuard stool kit that has not been returned. Colon cancer is the third most common type of cancer and is the second most common cause of death from cancer in the [place]. A large portion of colon cancers are preventable with appropriate screening. Also, when colon cancer is found in earlier stages it has a much higher likelihood of being cured. This is letter serves as a reminder for you to mail in your cologuard ASAP. If you need a new kit, please call the [facility] lab at [phone].     For any questions, please let me know.  Thank you,     TJ Hartmann

## 2024-05-30 NOTE — TELEPHONE ENCOUNTER
Jyoti and informed pt of PCP's message below. Pt verbalized understanding . States she still has the kit and will complete and mail it .

## 2024-06-03 DIAGNOSIS — Z12.11 SCREENING FOR MALIGNANT NEOPLASM OF COLON: Primary | ICD-10-CM

## 2024-07-03 ENCOUNTER — TELEPHONE (OUTPATIENT)
Dept: INTERNAL MEDICINE | Facility: CLINIC | Age: 49
End: 2024-07-03

## 2024-08-09 ENCOUNTER — LAB VISIT (OUTPATIENT)
Dept: LAB | Facility: HOSPITAL | Age: 49
End: 2024-08-09
Payer: MEDICAID

## 2024-08-09 DIAGNOSIS — K21.9 GASTROESOPHAGEAL REFLUX DISEASE WITHOUT ESOPHAGITIS: ICD-10-CM

## 2024-08-09 DIAGNOSIS — Z13.0 SCREENING FOR IRON DEFICIENCY ANEMIA: ICD-10-CM

## 2024-08-09 DIAGNOSIS — E78.1 HYPERTRIGLYCERIDEMIA: ICD-10-CM

## 2024-08-09 DIAGNOSIS — R73.01 IMPAIRED FASTING GLUCOSE: ICD-10-CM

## 2024-08-09 DIAGNOSIS — Z00.00 WELL ADULT EXAM: ICD-10-CM

## 2024-08-09 DIAGNOSIS — Z13.29 SCREENING FOR THYROID DISORDER: ICD-10-CM

## 2024-08-09 DIAGNOSIS — Z13.21 ENCOUNTER FOR VITAMIN DEFICIENCY SCREENING: ICD-10-CM

## 2024-08-09 LAB
25(OH)D3+25(OH)D2 SERPL-MCNC: 61 NG/ML (ref 30–80)
ALBUMIN SERPL-MCNC: 4 G/DL (ref 3.5–5)
ALBUMIN/GLOB SERPL: 1.3 RATIO (ref 1.1–2)
ALP SERPL-CCNC: 61 UNIT/L (ref 40–150)
ALT SERPL-CCNC: 20 UNIT/L (ref 0–55)
ANION GAP SERPL CALC-SCNC: 9 MEQ/L
AST SERPL-CCNC: 14 UNIT/L (ref 5–34)
BACTERIA #/AREA URNS AUTO: ABNORMAL /HPF
BASOPHILS # BLD AUTO: 0.03 X10(3)/MCL
BASOPHILS NFR BLD AUTO: 0.3 %
BILIRUB SERPL-MCNC: 0.5 MG/DL
BILIRUB UR QL STRIP.AUTO: NEGATIVE
BUN SERPL-MCNC: 20.6 MG/DL (ref 9.8–20.1)
CALCIUM SERPL-MCNC: 10.9 MG/DL (ref 8.4–10.2)
CHLORIDE SERPL-SCNC: 110 MMOL/L (ref 98–107)
CHOLEST SERPL-MCNC: 181 MG/DL
CHOLEST/HDLC SERPL: 4 {RATIO} (ref 0–5)
CLARITY UR: CLEAR
CO2 SERPL-SCNC: 25 MMOL/L (ref 22–29)
COLOR UR AUTO: YELLOW
CREAT SERPL-MCNC: 1.05 MG/DL (ref 0.55–1.02)
CREAT UR-MCNC: 136.6 MG/DL (ref 45–106)
CREAT/UREA NIT SERPL: 20
EOSINOPHIL # BLD AUTO: 0.12 X10(3)/MCL (ref 0–0.9)
EOSINOPHIL NFR BLD AUTO: 1.4 %
ERYTHROCYTE [DISTWIDTH] IN BLOOD BY AUTOMATED COUNT: 12.7 % (ref 11.5–17)
EST. AVERAGE GLUCOSE BLD GHB EST-MCNC: 93.9 MG/DL
FERRITIN SERPL-MCNC: 462.52 NG/ML (ref 4.63–204)
GFR SERPLBLD CREATININE-BSD FMLA CKD-EPI: >60 ML/MIN/1.73/M2
GLOBULIN SER-MCNC: 3.2 GM/DL (ref 2.4–3.5)
GLUCOSE SERPL-MCNC: 93 MG/DL (ref 74–100)
GLUCOSE UR QL STRIP: NORMAL
HBA1C MFR BLD: 4.9 %
HCT VFR BLD AUTO: 44.3 % (ref 37–47)
HDLC SERPL-MCNC: 42 MG/DL (ref 35–60)
HGB BLD-MCNC: 14.9 G/DL (ref 12–16)
HGB UR QL STRIP: NEGATIVE
HYALINE CASTS #/AREA URNS LPF: ABNORMAL /LPF
IMM GRANULOCYTES # BLD AUTO: 0.02 X10(3)/MCL (ref 0–0.04)
IMM GRANULOCYTES NFR BLD AUTO: 0.2 %
IRON SATN MFR SERPL: 31 % (ref 20–50)
IRON SERPL-MCNC: 83 UG/DL (ref 50–170)
KETONES UR QL STRIP: NEGATIVE
LDLC SERPL CALC-MCNC: 88 MG/DL (ref 50–140)
LEUKOCYTE ESTERASE UR QL STRIP: NEGATIVE
LYMPHOCYTES # BLD AUTO: 3.39 X10(3)/MCL (ref 0.6–4.6)
LYMPHOCYTES NFR BLD AUTO: 38.3 %
MCH RBC QN AUTO: 32.2 PG (ref 27–31)
MCHC RBC AUTO-ENTMCNC: 33.6 G/DL (ref 33–36)
MCV RBC AUTO: 95.7 FL (ref 80–94)
MICROALBUMIN UR-MCNC: <5 UG/ML
MICROALBUMIN/CREAT RATIO PNL UR: ABNORMAL
MONOCYTES # BLD AUTO: 0.55 X10(3)/MCL (ref 0.1–1.3)
MONOCYTES NFR BLD AUTO: 6.2 %
MUCOUS THREADS URNS QL MICRO: ABNORMAL /LPF
NEUTROPHILS # BLD AUTO: 4.75 X10(3)/MCL (ref 2.1–9.2)
NEUTROPHILS NFR BLD AUTO: 53.6 %
NITRITE UR QL STRIP: NEGATIVE
NRBC BLD AUTO-RTO: 0 %
PH UR STRIP: 7 [PH]
PLATELET # BLD AUTO: 197 X10(3)/MCL (ref 130–400)
PMV BLD AUTO: 10.5 FL (ref 7.4–10.4)
POTASSIUM SERPL-SCNC: 5.1 MMOL/L (ref 3.5–5.1)
PROT SERPL-MCNC: 7.2 GM/DL (ref 6.4–8.3)
PROT UR QL STRIP: NEGATIVE
RBC # BLD AUTO: 4.63 X10(6)/MCL (ref 4.2–5.4)
RBC #/AREA URNS AUTO: ABNORMAL /HPF
SODIUM SERPL-SCNC: 144 MMOL/L (ref 136–145)
SP GR UR STRIP.AUTO: 1.02 (ref 1–1.03)
SQUAMOUS #/AREA URNS LPF: ABNORMAL /HPF
T4 FREE SERPL-MCNC: 0.89 NG/DL (ref 0.7–1.48)
TIBC SERPL-MCNC: 186 UG/DL (ref 70–310)
TIBC SERPL-MCNC: 269 UG/DL (ref 250–450)
TRANSFERRIN SERPL-MCNC: 249 MG/DL (ref 180–382)
TRIGL SERPL-MCNC: 256 MG/DL (ref 37–140)
TSH SERPL-ACNC: 2.02 UIU/ML (ref 0.35–4.94)
UROBILINOGEN UR STRIP-ACNC: NORMAL
VLDLC SERPL CALC-MCNC: 51 MG/DL
WBC # BLD AUTO: 8.86 X10(3)/MCL (ref 4.5–11.5)
WBC #/AREA URNS AUTO: ABNORMAL /HPF

## 2024-08-09 PROCEDURE — 36415 COLL VENOUS BLD VENIPUNCTURE: CPT

## 2024-08-09 PROCEDURE — 83540 ASSAY OF IRON: CPT

## 2024-08-09 PROCEDURE — 82306 VITAMIN D 25 HYDROXY: CPT

## 2024-08-09 PROCEDURE — 83550 IRON BINDING TEST: CPT

## 2024-08-09 PROCEDURE — 80053 COMPREHEN METABOLIC PANEL: CPT

## 2024-08-09 PROCEDURE — 82728 ASSAY OF FERRITIN: CPT

## 2024-08-09 PROCEDURE — 83036 HEMOGLOBIN GLYCOSYLATED A1C: CPT

## 2024-08-09 PROCEDURE — 80061 LIPID PANEL: CPT

## 2024-08-09 PROCEDURE — 84443 ASSAY THYROID STIM HORMONE: CPT

## 2024-08-09 PROCEDURE — 82570 ASSAY OF URINE CREATININE: CPT

## 2024-08-09 PROCEDURE — 82043 UR ALBUMIN QUANTITATIVE: CPT

## 2024-08-09 PROCEDURE — 85025 COMPLETE CBC W/AUTO DIFF WBC: CPT

## 2024-08-09 PROCEDURE — 84439 ASSAY OF FREE THYROXINE: CPT

## 2024-08-09 PROCEDURE — 81001 URINALYSIS AUTO W/SCOPE: CPT

## 2024-08-14 ENCOUNTER — OFFICE VISIT (OUTPATIENT)
Dept: INTERNAL MEDICINE | Facility: CLINIC | Age: 49
End: 2024-08-14
Payer: MEDICAID

## 2024-08-14 VITALS
TEMPERATURE: 98 F | DIASTOLIC BLOOD PRESSURE: 76 MMHG | SYSTOLIC BLOOD PRESSURE: 109 MMHG | HEART RATE: 84 BPM | BODY MASS INDEX: 31.08 KG/M2 | RESPIRATION RATE: 16 BRPM | WEIGHT: 193.38 LBS | HEIGHT: 66 IN | OXYGEN SATURATION: 96 %

## 2024-08-14 DIAGNOSIS — E78.1 HYPERTRIGLYCERIDEMIA: ICD-10-CM

## 2024-08-14 DIAGNOSIS — E66.09 CLASS 1 OBESITY DUE TO EXCESS CALORIES WITH SERIOUS COMORBIDITY AND BODY MASS INDEX (BMI) OF 31.0 TO 31.9 IN ADULT: ICD-10-CM

## 2024-08-14 DIAGNOSIS — G47.33 OSA (OBSTRUCTIVE SLEEP APNEA): ICD-10-CM

## 2024-08-14 DIAGNOSIS — D75.89 MACROCYTOSIS: ICD-10-CM

## 2024-08-14 DIAGNOSIS — Z00.00 WELL ADULT EXAM: Primary | ICD-10-CM

## 2024-08-14 DIAGNOSIS — I10 PRIMARY HYPERTENSION: ICD-10-CM

## 2024-08-14 DIAGNOSIS — Z12.11 SCREEN FOR COLON CANCER: Primary | ICD-10-CM

## 2024-08-14 DIAGNOSIS — Z12.11 SCREENING FOR MALIGNANT NEOPLASM OF COLON: ICD-10-CM

## 2024-08-14 PROBLEM — E66.811 CLASS 1 OBESITY DUE TO EXCESS CALORIES WITH SERIOUS COMORBIDITY AND BODY MASS INDEX (BMI) OF 31.0 TO 31.9 IN ADULT: Status: ACTIVE | Noted: 2024-08-14

## 2024-08-14 PROCEDURE — 3061F NEG MICROALBUMINURIA REV: CPT | Mod: CPTII,,,

## 2024-08-14 PROCEDURE — 3066F NEPHROPATHY DOC TX: CPT | Mod: CPTII,,,

## 2024-08-14 PROCEDURE — 99215 OFFICE O/P EST HI 40 MIN: CPT | Mod: PBBFAC

## 2024-08-14 PROCEDURE — 3044F HG A1C LEVEL LT 7.0%: CPT | Mod: CPTII,,,

## 2024-08-14 PROCEDURE — 1160F RVW MEDS BY RX/DR IN RCRD: CPT | Mod: CPTII,,,

## 2024-08-14 PROCEDURE — 99396 PREV VISIT EST AGE 40-64: CPT | Mod: S$PBB,,,

## 2024-08-14 PROCEDURE — 1159F MED LIST DOCD IN RCRD: CPT | Mod: CPTII,,,

## 2024-08-14 PROCEDURE — 3008F BODY MASS INDEX DOCD: CPT | Mod: CPTII,,,

## 2024-08-14 PROCEDURE — 3078F DIAST BP <80 MM HG: CPT | Mod: CPTII,,,

## 2024-08-14 PROCEDURE — 99214 OFFICE O/P EST MOD 30 MIN: CPT | Mod: S$PBB,25,,

## 2024-08-14 PROCEDURE — 3074F SYST BP LT 130 MM HG: CPT | Mod: CPTII,,,

## 2024-08-14 RX ORDER — POLYETHYLENE GLYCOL 3350, SODIUM SULFATE, SODIUM CHLORIDE, POTASSIUM CHLORIDE, SODIUM ASCORBATE, AND ASCORBIC ACID 7.5-2.691G
KIT ORAL
Qty: 1 KIT | Refills: 0 | Status: SHIPPED | OUTPATIENT
Start: 2024-08-14

## 2024-08-14 NOTE — ASSESSMENT & PLAN NOTE
Latest Reference Range & Units 08/09/24 06:21   Hemoglobin 12.0 - 16.0 g/dL 14.9   Hematocrit 37.0 - 47.0 % 44.3   MCV 80.0 - 94.0 fL 95.7 (H)   MCH 27.0 - 31.0 pg 32.2 (H)   (H): Data is abnormally high  Vitamin B12 and folate ordered.

## 2024-08-14 NOTE — PATIENT INSTRUCTIONS
REMINDER: Please complete labs within 1 week of appointment.   Please complete satisfaction survey when received. Thank you.    Bernardo Mills,     If you are due for any health screening(s) below please notify me so we can arrange them to be ordered and scheduled. Most healthy patients at your age complete them, but you are free to accept or refuse.     If you can't do it, I'll definitely understand. If you can, I'd certainly appreciate it!    Tests to Keep You Healthy    Mammogram: Met on 8/18/2023  Colon Cancer Screening: DUE  Cervical Cancer Screening: Met on 4/28/2023  Last Blood Pressure <= 139/89 (8/14/2024): Yes      Its time for your colon cancer screening     Colorectal cancer is one of the leading causes of cancer death for men and women but it doesnt have to be. Screenings can prevent colorectal cancer or find it early enough to treat and cure the disease.     Our records indicate that you may be overdue for colon cancer screening. A colonoscopy or stool screening test can help identify patients at risk for developing colon cancer. Cancer screenings save lives, so schedule yours today to stay healthy.     A colonoscopy is the preferred test for detecting colon cancer. It is needed only once every 10 years if results are negative. While you are sedated, a flexible, lighted tube with a tiny camera is inserted into the rectum and advanced through the colon to look for cancers.     An alternative screening test that is used at home and returned to the lab may also be used. It detects hidden blood in bowel movements which could indicate cancer in the colon. If results are positive, you will need a colonoscopy to determine if the blood is a sign of cancer. This type of follow up (diagnostic) colonoscopy usually requires additional copays as required by your insurance provider.     If you recently had your colon cancer screening performed outside of [facility], please let your Health care team know so  that they can update your health record. Please contact your PCP if you have any questions.

## 2024-08-14 NOTE — ASSESSMENT & PLAN NOTE
"Cologuard sample could not be processed on 3/13/2024. She reports having a repeat cologuard, but states "I can't do it, I would rather a colonoscopy." Referral placed to Endo  for screening colonoscopy.    "

## 2024-08-14 NOTE — ASSESSMENT & PLAN NOTE
"Wellness labs - 8/9/2024.  Breast Cancer Screening - 8/18/2023: MMG-Birads 1. Repeat annually.   Cervical Cancer Screening- PAP 4/28/2023 - NIL. Follow up with GYN annually for Pelvic/Pap.  LDCT - Declines.  Osteoporosis Screening - Declines.  Colon Cancer Screening - Cologuard sample could not be processed on 3/13/2024. She reports having a repeat cologuard, but states "I can't do it, I would rather a colonoscopy." Referral placed to Endo .  Vaccines: Flu Not currently being offered, recommended annually. / Pneumonia 8/7/2018, 12/16/2022 / Tetanus 7/22/2019 / Shingles #1 - 4/17/2023, #2 - 8/17/2023  "

## 2024-08-14 NOTE — ASSESSMENT & PLAN NOTE
Lab Results   Component Value Date    LDL 88.00 08/09/2024       Lab Results   Component Value Date    TRIG 256 (H) 08/09/2024       Lab Results   Component Value Date    HDL 42 08/09/2024        Lab Results   Component Value Date    CHOL 181 08/09/2024   Continue pravastatin and fenofibrate.  Follow a low cholesterol, low saturated fat diet with less than 200 mg of cholesterol a day.   Avoid fried foods and high saturated fats.  Add flax seed or fish oil supplements to diet.   Increase dietary fiber.   Regular exercise improves cholesterol levels.  Physical activity 5 times a week for 30 minutes per day (or 150 minutes per week).   Stressed importance of dietary modifications.

## 2024-08-14 NOTE — PROGRESS NOTES
"    PATIENT NAME: Lina Kat  : 1975  DATE: 24  MRN: [id number]          Reason for Visit/Chief Complaint   Hypertension, Annual Exam, and Colon Cancer Screening       History of Present Illness (HPI)     Lina Kat is a 49 y.o. White female presenting in clinic today for Hypertension, Annual Exam, and Colon Cancer Screening. PMH of anxiety/depression (controlled), HTN, UBALDO with CPAP, tobacco use, VSG (3/1/2023 with Dr. Zhou Rubio). On 3/29/2023, she had an excisional biopsy of lipoma of right knee. She is followed by [facility] orthopedic and GYN clinics.     All pertinent labs dated 2024 reviewed and discussed with patient. BP-109/76 - at goal. Denies CP, SOB, HA, dizziness, or vision changes.      Continues to smoke 1 ppd - not ready to quit. Denies alcohol or illicit drug use. Denies chest pain, shortness of breath, cough, headache, dizziness, weakness, abdominal pain, nausea, vomiting, diarrhea, constipation, dysuria, depression, anxiety, SI, and HI.    Breast Cancer Screening - 2023: MMG-Birads 1. Repeat annually.   Cervical Cancer Screening- PAP 2023 - NIL. Follow up with GYN annually for Pelvic/Pap.  LDCT - Declines.  Osteoporosis Screening - Declines.  Colon Cancer Screening - Cologuard sample could not be processed on 3/13/2024. She reports having a repeat cologuard, but states "I can't do it, I would rather a colonoscopy." Referral placed to Endo .  Vaccines: Flu Not currently being offered, recommended annually. / Pneumonia 2018, 2022 / Tetanus 2019 / Shingles #1 - 2023, #2 - 2023       Review of Systems     Review of Systems   Constitutional: Negative.    HENT: Negative.     Eyes: Negative.    Respiratory: Negative.     Cardiovascular: Negative.    Gastrointestinal: Negative.    Endocrine: Negative.    Genitourinary: Negative.    Musculoskeletal: Negative.    Skin: Negative.    Allergic/Immunologic: Negative.    Neurological: Negative.  "   Hematological: Negative.    Psychiatric/Behavioral: Negative.     All other systems reviewed and are negative.      Medical / Social / Family History     Past Medical History:   Diagnosis Date    Anxiety     Depression     Hyperlipidemia     Hypertension     UBALDO (obstructive sleep apnea)          Past Surgical History:   Procedure Laterality Date    CHOLECYSTECTOMY      gastric sleeve  03/01/2023    SURGICAL REMOVAL OF MASS OF LOWER EXTREMITY Right 3/29/2023    Procedure: EXCISION, MASS, LOWER EXTREMITY;  Surgeon: Cody Joseph MD;  Location: [facility];  Service: General;  Laterality: Right;  Right lower extremity/ anterior knee    TUBAL LIGATION           Social History  Lina Quans  reports that she quit smoking about 20 months ago. Her smoking use included cigarettes. She started smoking about 16 years ago. She has a 15 pack-year smoking history. She has never used smokeless tobacco. She reports that she does not drink alcohol and does not use drugs.    Family History  Lina Quans family history includes Brain cancer in her maternal aunt and paternal uncle; Diabetes type II in her father; Heart attack in her father; Hypertension in her brother and mother.    Medications and Allergies     Medications  Current Outpatient Medications   Medication Instructions    albuterol (PROVENTIL/VENTOLIN HFA) 90 mcg/actuation inhaler 2 puffs, Inhalation, Every 6 hours PRN    busPIRone (BUSPAR) 7.5 mg, Oral, 2 times daily    fenofibrate 160 mg, Oral, Daily    oxybutynin (DITROPAN) 5 mg, Oral, 2 times daily    pantoprazole (PROTONIX) 40 mg, Oral, Daily    pravastatin (PRAVACHOL) 40 mg, Oral, Nightly    sertraline (ZOLOFT) 200 mg, Oral, Daily       Allergies  Review of patient's allergies indicates:   Allergen Reactions    Codeine Swelling       Physical Examination   Visit Vitals  /76 (BP Location: Right arm, Patient Position: Sitting, BP Method: Large (Automatic))   Pulse 84   Temp 98 °F (36.7 °C) (Oral)   Resp 16  "  Ht 5' 6" (1.676 m)   Wt 87.7 kg (193 lb 6.4 oz)   SpO2 96%   BMI 31.22 kg/m²     Physical Exam  Vitals reviewed.   Constitutional:       Appearance: Normal appearance. She is normal weight.   HENT:      Head: Normocephalic and atraumatic.      Right Ear: External ear normal.      Left Ear: External ear normal.      Nose: Nose normal.      Mouth/Throat:      Mouth: Mucous membranes are moist.      Pharynx: Oropharynx is clear.   Eyes:      Extraocular Movements: Extraocular movements intact.      Conjunctiva/sclera: Conjunctivae normal.      Pupils: Pupils are equal, round, and reactive to light.   Cardiovascular:      Rate and Rhythm: Normal rate and regular rhythm.      Pulses: Normal pulses.      Heart sounds: Normal heart sounds.   Pulmonary:      Effort: Pulmonary effort is normal.      Breath sounds: Normal breath sounds.   Abdominal:      General: Bowel sounds are normal.      Palpations: Abdomen is soft.   Musculoskeletal:         General: Normal range of motion.      Cervical back: Normal range of motion and neck supple.   Skin:     General: Skin is warm and dry.      Capillary Refill: Capillary refill takes less than 2 seconds.   Neurological:      General: No focal deficit present.      Mental Status: She is alert and oriented to person, place, and time.   Psychiatric:         Mood and Affect: Mood normal.         Behavior: Behavior normal.         Thought Content: Thought content normal.         Judgment: Judgment normal.           Results     Lab Results   Component Value Date    WBC 8.86 08/09/2024    RBC 4.63 08/09/2024    HGB 14.9 08/09/2024    HCT 44.3 08/09/2024    MCV 95.7 (H) 08/09/2024    MCH 32.2 (H) 08/09/2024    MCHC 33.6 08/09/2024    RDW 12.7 08/09/2024     08/09/2024    MPV 10.5 (H) 08/09/2024      Lab Results   Component Value Date     08/09/2024    K 5.1 08/09/2024    CHLORIDE 109 (H) 02/10/2023    CO2 25 08/09/2024    GLUCOSE 93 08/09/2024    BUN 20.6 (H) 08/09/2024    " "CREATININE 1.05 (H) 08/09/2024    LABPROT 7.2 08/09/2024    ALBUMIN 4.0 08/09/2024    BILITOT 0.5 08/09/2024    ALKPHOS 61 08/09/2024    AST 14 08/09/2024    ALT 20 08/09/2024    AGAP 9.0 08/09/2024    EGFRNORACEVR >60 08/09/2024     Lab Results   Component Value Date    TSH 2.023 08/09/2024     Lab Results   Component Value Date    CHOL 181 08/09/2024    HDL 42 08/09/2024    LDL 88.00 08/09/2024    TRIG 256 (H) 08/09/2024     Lab Results   Component Value Date    SGUA 1.025 08/09/2024    PROTEINUA Negative 08/09/2024    BILIRUBINUA Negative 08/09/2024    WBCUA 0-5 08/09/2024    RBCUA 0-5 08/09/2024    BACTERIA Trace (A) 08/09/2024    LEUKOCYTESUR Negative 08/09/2024    UROBILINOGEN Normal 08/09/2024     Lab Results   Component Value Date    CREATRANDUR 136.6 (H) 08/09/2024    MICALBCREAT  08/09/2024      Comment:      Unable to calculate     Lab Results   Component Value Date    EJQEYVRX88XD 61 08/09/2024     Lab Results   Component Value Date    HIV Nonreactive 04/14/2023    HEPAIGM Nonreactive 04/14/2023    HEPBSAG Nonreactive 04/14/2023    HEPCAB Nonreactive 04/14/2023     Lab Results   Component Value Date    COLOGUARD Sample Could Not Be Processed 3 03/13/2024     No results found for: "OCCBLDIA"    Assessment and Plan (including Health Maintenance)     Problem List Items Addressed This Visit          Cardiac/Vascular    Hypertension    Hypertriglyceridemia       Oncology    Macrocytosis       Endocrine    Class 1 obesity due to excess calories with serious comorbidity and body mass index (BMI) of 31.0 to 31.9 in adult       GI    Screening for malignant neoplasm of colon    Relevant Orders    Ambulatory referral/consult to Endo Procedure        Other    Well adult exam - Primary        Health Maintenance Due   Topic Date Due    Colorectal Cancer Screening  Never done    Mammogram  08/18/2024     Tests to Keep You Healthy    Mammogram: Met on 8/18/2023  Colon Cancer Screening: DUE  Cervical Cancer " Screening: Met on 4/28/2023  Last Blood Pressure <= 139/89 (8/14/2024): Yes      Health Maintenance Topics with due status: Not Due       Topic Last Completion Date    TETANUS VACCINE 07/22/2019    Influenza Vaccine 12/16/2022    Cervical Cancer Screening 04/28/2023    Hemoglobin A1c (Prediabetes) 08/09/2024    Lipid Panel 08/09/2024       Future Appointments   Date Time Provider Department Center   8/19/2024  7:00 AM [facility]AMMO1 [facility]   1/23/2025  8:50 AM Faina Weinstein, ANP [facility] [place]   2/14/2025 10:40 AM Sheela Esparza FNP [facility]   8/20/2025  9:00 AM [facility] MAMMO2 SCR2 [facility] [place]        Follow up in about 6 months (around 2/14/2025) for Virtual Visit, Follow up, Med check, Lab review, RTC PRN.          Signature:        RAMIRO Rodgers  [facility]  [address] [id number]    Date of encounter: 8/14/24

## 2024-08-14 NOTE — ASSESSMENT & PLAN NOTE
Reports not currently using CPAP, but states she has been difficulty sleeping over the last month. Encouraged patient to use CPAP nightly for maximum effect.

## 2024-08-19 ENCOUNTER — HOSPITAL ENCOUNTER (OUTPATIENT)
Dept: RADIOLOGY | Facility: HOSPITAL | Age: 49
Discharge: HOME | End: 2024-08-19
Payer: MEDICAID

## 2024-08-19 DIAGNOSIS — Z12.31 BREAST CANCER SCREENING BY MAMMOGRAM: ICD-10-CM

## 2024-08-19 PROCEDURE — 77067 SCR MAMMO BI INCL CAD: CPT | Mod: 26,,, | Performed by: RADIOLOGY

## 2024-08-19 PROCEDURE — 77067 SCR MAMMO BI INCL CAD: CPT | Mod: TC

## 2024-08-19 PROCEDURE — 77063 BREAST TOMOSYNTHESIS BI: CPT | Mod: 26,,, | Performed by: RADIOLOGY

## 2024-10-08 ENCOUNTER — PATIENT MESSAGE (OUTPATIENT)
Dept: ADMINISTRATIVE | Facility: OTHER | Age: 49
End: 2024-10-08
Payer: MEDICAID

## 2024-11-14 DIAGNOSIS — N32.81 OAB (OVERACTIVE BLADDER): ICD-10-CM

## 2024-11-15 DIAGNOSIS — F33.42 RECURRENT MAJOR DEPRESSIVE DISORDER, IN FULL REMISSION: ICD-10-CM

## 2024-11-15 DIAGNOSIS — E78.1 HYPERTRIGLYCERIDEMIA: ICD-10-CM

## 2024-11-15 RX ORDER — OXYBUTYNIN CHLORIDE 5 MG/1
5 TABLET ORAL 2 TIMES DAILY
Qty: 180 TABLET | Refills: 2 | Status: SHIPPED | OUTPATIENT
Start: 2024-11-15 | End: 2025-11-15

## 2024-11-18 RX ORDER — FENOFIBRATE 160 MG/1
160 TABLET ORAL DAILY
Qty: 30 TABLET | Refills: 8 | Status: SHIPPED | OUTPATIENT
Start: 2024-11-18 | End: 2025-11-18

## 2024-11-18 RX ORDER — SERTRALINE HYDROCHLORIDE 100 MG/1
200 TABLET, FILM COATED ORAL DAILY
Qty: 60 TABLET | Refills: 8 | Status: SHIPPED | OUTPATIENT
Start: 2024-11-18 | End: 2025-11-18

## 2024-12-06 DIAGNOSIS — F41.9 ANXIETY AND DEPRESSION: ICD-10-CM

## 2024-12-06 DIAGNOSIS — F32.A ANXIETY AND DEPRESSION: ICD-10-CM

## 2024-12-09 RX ORDER — BUSPIRONE HYDROCHLORIDE 7.5 MG/1
7.5 TABLET ORAL 2 TIMES DAILY
Qty: 180 TABLET | Refills: 2 | Status: SHIPPED | OUTPATIENT
Start: 2024-12-09 | End: 2025-12-09

## 2025-01-30 ENCOUNTER — OFFICE VISIT (OUTPATIENT)
Dept: GYNECOLOGY | Facility: CLINIC | Age: 50
End: 2025-01-30
Payer: MEDICAID

## 2025-01-30 VITALS
SYSTOLIC BLOOD PRESSURE: 116 MMHG | HEART RATE: 72 BPM | RESPIRATION RATE: 18 BRPM | WEIGHT: 203.19 LBS | BODY MASS INDEX: 32.66 KG/M2 | DIASTOLIC BLOOD PRESSURE: 83 MMHG | HEIGHT: 66 IN | TEMPERATURE: 99 F | OXYGEN SATURATION: 96 %

## 2025-01-30 DIAGNOSIS — R92.321 SCATTERED FIBROGLANDULAR TISSUE DENSITY OF RIGHT BREAST ON MAMMOGRAPHY: ICD-10-CM

## 2025-01-30 DIAGNOSIS — Z01.419 ENCOUNTER FOR ANNUAL ROUTINE GYNECOLOGICAL EXAMINATION: Primary | ICD-10-CM

## 2025-01-30 PROCEDURE — 99396 PREV VISIT EST AGE 40-64: CPT | Mod: S$PBB,,, | Performed by: NURSE PRACTITIONER

## 2025-01-30 PROCEDURE — 3079F DIAST BP 80-89 MM HG: CPT | Mod: CPTII,,, | Performed by: NURSE PRACTITIONER

## 2025-01-30 PROCEDURE — 3008F BODY MASS INDEX DOCD: CPT | Mod: CPTII,,, | Performed by: NURSE PRACTITIONER

## 2025-01-30 PROCEDURE — 99214 OFFICE O/P EST MOD 30 MIN: CPT | Mod: PBBFAC | Performed by: NURSE PRACTITIONER

## 2025-01-30 PROCEDURE — 3074F SYST BP LT 130 MM HG: CPT | Mod: CPTII,,, | Performed by: NURSE PRACTITIONER

## 2025-01-30 PROCEDURE — 1159F MED LIST DOCD IN RCRD: CPT | Mod: CPTII,,, | Performed by: NURSE PRACTITIONER

## 2025-01-30 NOTE — PROGRESS NOTES
"  [facility] -  Gynecology / Women's Health Clinic      Subjective:       Patient ID: Lina Kat is a 50 y.o. female.    Chief Complaint:  Well Woman    History of Present Illness  The patient is  here for annual exam. Pt is postmenopausal since age 43. Denies history of abnormal paps. Last pap -NIL and HPV neg. Last MMG was 24-BIRADS 1. Hx of fibroglandular tissue. Pt feels that this area in Rt breast is getting bigger, would like further evaluation. Denies breast tenderness/pain or lump. Denies urinary complaints. Denies pelvic pain, abnormal bleeding or discharge. Pt reports no STIs in the past and no concerns. Admits tobacco use. Dep. screening 0. Denies fly hx of ovarian, uterine or colon cancer. Admits breast cancer in [facility]. Colonoscopy pending. No GYN complaints today.     GYN & OB History  No LMP recorded. Patient is postmenopausal.   Date of Last Pap: 2023    Review of patient's allergies indicates:   Allergen Reactions    Codeine Swelling     Past Medical History:   Diagnosis Date    Anxiety     Depression     Hyperlipidemia     Hypertension     UBALDO (obstructive sleep apnea)      OB History    Para Term  AB Living   1 1           SAB IAB Ectopic Multiple Live Births                  # Outcome Date GA Lbr Serjio/2nd Weight Sex Type Anes PTL Lv   1 Para                 Review of Systems  Review of Systems    Negative except for pertinent findings for positives per HPI     Objective:    Physical Exam    /83 (BP Location: Right arm, Patient Position: Sitting)   Pulse 72   Temp 98.6 °F (37 °C) (Oral)   Resp 18   Ht 5' 6" (1.676 m)   Wt 92.2 kg (203 lb 3.2 oz)   SpO2 96%   BMI 32.80 kg/m²   GENERAL: Well-developed female. No acute distress.    SKIN: Normal to inspection, warm and intact.  BREASTS: No rashes or erythema. No masses, lumps, discharge, tenderness.  VULVA: General appearance normal; external genitalia with no lesions or erythema.  VAGINA: " Mucosa/vaginal vault pink, no abnormal discharge or lesions.  CERVIX: Pink, nulliparous appearing os, no erythema or abnormal discharge.  BIMANUAL EXAM: reveals a 12 week-sized uterus. The uterus is non tender. [place] adnexa reveal no tenderness.  PSYCHIATRIC: Patient is oriented to person, place, and time. Mood and affect are normal.    Assessment:         ICD-10-CM ICD-9-CM   1. Encounter for annual routine gynecological examination  Z01.419 V72.31   2. Scattered fibroglandular tissue density of right breast on mammography  R92.321 793.82     Plan:   Lina was seen today for well woman.    Diagnoses and all orders for this visit:    Encounter for annual routine gynecological examination    Scattered fibroglandular tissue density of right breast on mammography  -     Mammo Digital Diagnostic Bilat with Gareth; Future  -     US Breast Right Limited; Future    Pelvic today, pap utd per ACOG  Call clinic with any vaginal bleeding which would be abnormal.  Diagnostic MG per pt request  Follow up in about 1 year (around 1/30/2026) for annual exam.

## 2025-02-12 ENCOUNTER — LAB VISIT (OUTPATIENT)
Dept: LAB | Facility: HOSPITAL | Age: 50
End: 2025-02-12
Payer: MEDICAID

## 2025-02-12 DIAGNOSIS — D75.89 MACROCYTOSIS: ICD-10-CM

## 2025-02-12 DIAGNOSIS — E78.1 HYPERTRIGLYCERIDEMIA: ICD-10-CM

## 2025-02-12 DIAGNOSIS — I10 PRIMARY HYPERTENSION: ICD-10-CM

## 2025-02-12 LAB
ALBUMIN SERPL-MCNC: 4.1 G/DL (ref 3.5–5)
ALBUMIN/GLOB SERPL: 1.1 RATIO (ref 1.1–2)
ALP SERPL-CCNC: 62 UNIT/L (ref 40–150)
ALT SERPL-CCNC: 22 UNIT/L (ref 0–55)
ANION GAP SERPL CALC-SCNC: 7 MEQ/L
AST SERPL-CCNC: 17 UNIT/L (ref 5–34)
BACTERIA #/AREA URNS AUTO: ABNORMAL /HPF
BASOPHILS # BLD AUTO: 0.03 X10(3)/MCL
BASOPHILS NFR BLD AUTO: 0.4 %
BILIRUB SERPL-MCNC: 0.4 MG/DL
BILIRUB UR QL STRIP.AUTO: NEGATIVE
BUN SERPL-MCNC: 19 MG/DL (ref 9.8–20.1)
CALCIUM SERPL-MCNC: 9.7 MG/DL (ref 8.4–10.2)
CHLORIDE SERPL-SCNC: 106 MMOL/L (ref 98–107)
CHOLEST SERPL-MCNC: 238 MG/DL
CHOLEST/HDLC SERPL: 6 {RATIO} (ref 0–5)
CLARITY UR: ABNORMAL
CO2 SERPL-SCNC: 28 MMOL/L (ref 22–29)
COLOR UR AUTO: ABNORMAL
CREAT SERPL-MCNC: 0.89 MG/DL (ref 0.55–1.02)
CREAT/UREA NIT SERPL: 21
EOSINOPHIL # BLD AUTO: 0.14 X10(3)/MCL (ref 0–0.9)
EOSINOPHIL NFR BLD AUTO: 1.7 %
ERYTHROCYTE [DISTWIDTH] IN BLOOD BY AUTOMATED COUNT: 12.7 % (ref 11.5–17)
FOLATE SERPL-MCNC: 18.6 NG/ML (ref 7–31.4)
GFR SERPLBLD CREATININE-BSD FMLA CKD-EPI: >60 ML/MIN/1.73/M2
GLOBULIN SER-MCNC: 3.6 GM/DL (ref 2.4–3.5)
GLUCOSE SERPL-MCNC: 94 MG/DL (ref 74–100)
GLUCOSE UR QL STRIP: NORMAL
HCT VFR BLD AUTO: 43.5 % (ref 37–47)
HDLC SERPL-MCNC: 38 MG/DL (ref 35–60)
HGB BLD-MCNC: 14.8 G/DL (ref 12–16)
HGB UR QL STRIP: NEGATIVE
HYALINE CASTS #/AREA URNS LPF: ABNORMAL /LPF
IMM GRANULOCYTES # BLD AUTO: 0.01 X10(3)/MCL (ref 0–0.04)
IMM GRANULOCYTES NFR BLD AUTO: 0.1 %
KETONES UR QL STRIP: NEGATIVE
LDLC SERPL CALC-MCNC: 145 MG/DL (ref 50–140)
LEUKOCYTE ESTERASE UR QL STRIP: NEGATIVE
LYMPHOCYTES # BLD AUTO: 3.11 X10(3)/MCL (ref 0.6–4.6)
LYMPHOCYTES NFR BLD AUTO: 38.4 %
MCH RBC QN AUTO: 31.6 PG (ref 27–31)
MCHC RBC AUTO-ENTMCNC: 34 G/DL (ref 33–36)
MCV RBC AUTO: 92.9 FL (ref 80–94)
MONOCYTES # BLD AUTO: 0.51 X10(3)/MCL (ref 0.1–1.3)
MONOCYTES NFR BLD AUTO: 6.3 %
MUCOUS THREADS URNS QL MICRO: ABNORMAL /LPF
NEUTROPHILS # BLD AUTO: 4.29 X10(3)/MCL (ref 2.1–9.2)
NEUTROPHILS NFR BLD AUTO: 53.1 %
NITRITE UR QL STRIP: NEGATIVE
NRBC BLD AUTO-RTO: 0 %
PH UR STRIP: 7 [PH]
PLATELET # BLD AUTO: 194 X10(3)/MCL (ref 130–400)
PMV BLD AUTO: 10.4 FL (ref 7.4–10.4)
POTASSIUM SERPL-SCNC: 4.1 MMOL/L (ref 3.5–5.1)
PROT SERPL-MCNC: 7.7 GM/DL (ref 6.4–8.3)
PROT UR QL STRIP: NEGATIVE
RBC # BLD AUTO: 4.68 X10(6)/MCL (ref 4.2–5.4)
RBC #/AREA URNS AUTO: ABNORMAL /HPF
SODIUM SERPL-SCNC: 141 MMOL/L (ref 136–145)
SP GR UR STRIP.AUTO: 1.02 (ref 1–1.03)
SQUAMOUS #/AREA URNS LPF: ABNORMAL /HPF
TRIGL SERPL-MCNC: 273 MG/DL (ref 37–140)
UROBILINOGEN UR STRIP-ACNC: NORMAL
VIT B12 SERPL-MCNC: 1360 PG/ML (ref 213–816)
VLDLC SERPL CALC-MCNC: 55 MG/DL
WBC # BLD AUTO: 8.09 X10(3)/MCL (ref 4.5–11.5)
WBC #/AREA URNS AUTO: ABNORMAL /HPF

## 2025-02-12 PROCEDURE — 82746 ASSAY OF FOLIC ACID SERUM: CPT

## 2025-02-12 PROCEDURE — 80061 LIPID PANEL: CPT

## 2025-02-12 PROCEDURE — 85025 COMPLETE CBC W/AUTO DIFF WBC: CPT

## 2025-02-12 PROCEDURE — 80053 COMPREHEN METABOLIC PANEL: CPT

## 2025-02-12 PROCEDURE — 36415 COLL VENOUS BLD VENIPUNCTURE: CPT

## 2025-02-12 PROCEDURE — 81001 URINALYSIS AUTO W/SCOPE: CPT

## 2025-02-12 PROCEDURE — 82607 VITAMIN B-12: CPT

## 2025-02-17 ENCOUNTER — TELEPHONE (OUTPATIENT)
Dept: INTERNAL MEDICINE | Facility: CLINIC | Age: 50
End: 2025-02-17
Payer: MEDICAID

## 2025-02-17 NOTE — TELEPHONE ENCOUNTER
----- Message from Lyle Darnell sent at 2/17/2025 11:29 AM CST -----  Who Called: Lina Dowling is returning phone callWho Left Message for Patient:N/ADoes the patient know what this is regarding?: appt/tele visit possiblyPreferred Method of Contact: Phone CallPatient's Preferred Phone Number on File: [phone] Best Call Back Number, if different:Additional Information: pt said she just received call from clinic

## 2025-02-28 ENCOUNTER — RESULTS FOLLOW-UP (OUTPATIENT)
Dept: INTERNAL MEDICINE | Facility: CLINIC | Age: 50
End: 2025-02-28

## 2025-03-07 ENCOUNTER — OFFICE VISIT (OUTPATIENT)
Dept: INTERNAL MEDICINE | Facility: CLINIC | Age: 50
End: 2025-03-07
Payer: MEDICAID

## 2025-03-07 VITALS
HEIGHT: 66 IN | RESPIRATION RATE: 18 BRPM | SYSTOLIC BLOOD PRESSURE: 122 MMHG | BODY MASS INDEX: 32.98 KG/M2 | WEIGHT: 205.19 LBS | OXYGEN SATURATION: 96 % | HEART RATE: 80 BPM | TEMPERATURE: 98 F | DIASTOLIC BLOOD PRESSURE: 87 MMHG

## 2025-03-07 DIAGNOSIS — R06.2 WHEEZING: ICD-10-CM

## 2025-03-07 DIAGNOSIS — Z72.0 TOBACCO USER: ICD-10-CM

## 2025-03-07 DIAGNOSIS — N63.0 FIBROUS BREAST LUMPS: ICD-10-CM

## 2025-03-07 DIAGNOSIS — Z23 IMMUNIZATION DUE: ICD-10-CM

## 2025-03-07 DIAGNOSIS — E78.2 MIXED HYPERLIPIDEMIA: Primary | ICD-10-CM

## 2025-03-07 DIAGNOSIS — K21.9 GASTROESOPHAGEAL REFLUX DISEASE WITHOUT ESOPHAGITIS: ICD-10-CM

## 2025-03-07 DIAGNOSIS — Z53.20 COLON CANCER SCREENING DECLINED: ICD-10-CM

## 2025-03-07 DIAGNOSIS — I10 PRIMARY HYPERTENSION: ICD-10-CM

## 2025-03-07 PROCEDURE — 99214 OFFICE O/P EST MOD 30 MIN: CPT | Mod: PBBFAC

## 2025-03-07 RX ORDER — ALBUTEROL SULFATE 90 UG/1
2 INHALANT RESPIRATORY (INHALATION) EVERY 6 HOURS PRN
Qty: 18 G | Refills: 4 | Status: SHIPPED | OUTPATIENT
Start: 2025-03-07 | End: 2026-03-07

## 2025-03-07 RX ORDER — PANTOPRAZOLE SODIUM 40 MG/1
40 TABLET, DELAYED RELEASE ORAL DAILY
Qty: 30 TABLET | Refills: 8 | Status: SHIPPED | OUTPATIENT
Start: 2025-03-07 | End: 2026-03-07

## 2025-03-07 RX ORDER — ATORVASTATIN CALCIUM 40 MG/1
40 TABLET, FILM COATED ORAL NIGHTLY
Qty: 90 TABLET | Refills: 3 | Status: SHIPPED | OUTPATIENT
Start: 2025-03-07 | End: 2026-03-07

## 2025-03-07 NOTE — ASSESSMENT & PLAN NOTE
Smoking cessation discussed for 3 minutes.   Pt not ready to quit.  Declines referral to Smoking Cessation program.  Discussed benefits of quitting including improved health, decreased cardiac/vascular/pulmonary/stroke risks as well as saving money.   Declines LDCT.

## 2025-03-07 NOTE — ASSESSMENT & PLAN NOTE
Lab Results   Component Value Date    .00 (H) 02/12/2025       Lab Results   Component Value Date    TRIG 273 (H) 02/12/2025       Lab Results   Component Value Date    HDL 38 02/12/2025        Lab Results   Component Value Date    CHOL 238 (H) 02/12/2025   Continue fenofibrate - refilled today.  D/c pravastatin, Rx atorvastatin (high dose).  Follow a low cholesterol, low saturated fat diet with less than 200 mg of cholesterol a day.   Avoid fried foods and high saturated fats.  Add flax seed or fish oil supplements to diet.   Increase dietary fiber.   Regular exercise improves cholesterol levels.  Physical activity 5 times a week for 30 minutes per day (or 150 minutes per week).   Stressed importance of dietary modifications.

## 2025-03-07 NOTE — ASSESSMENT & PLAN NOTE
Body mass index is 33.12 kg/m².  Goal BMI <30.  Aerobic exercise 150 minutes per week.  Avoid soda, simple sugars, sweets, excessive rice, pasta, potatoes or bread.   Choose brown options when available and portion control.  Limit fast foods and fried foods.   Choose complex carbs in moderation (ex: green, leafy vegetables, beans, oatmeal).  Eat plenty of fresh fruits and vegetables with lean meats daily.   Consider permanent healthy lifestyle changes.

## 2025-03-07 NOTE — ASSESSMENT & PLAN NOTE
"Vitals:    03/07/25 0931   BP: 122/87   Pulse: 80   Resp: 18   Temp: 98.2 °F (36.8 °C)   TempSrc: Oral   SpO2: 96%   Weight: 93.1 kg (205 lb 3.2 oz)   Height: 5' 6" (1.676 m)     At goal.  Diet controlled.  Follow a low sodium (less than 2 grams of sodium per day), DASH diet.   Monitor blood pressure and report any consistent values greater than 140/90 and keep a log.  Encouraged smoking cessation to aid in BP reduction and co-morbidities.   Maintain healthy weight with a BMI goal of <30.   Aerobic exercise for 150 minutes per week (or 5 days a week for 30 minutes each day).   "

## 2025-03-07 NOTE — PROGRESS NOTES
PATIENT NAME: Lina Kat  : 1975  DATE: 3/7/25  MRN: [id number]          Reason for Visit/Chief Complaint   Hyperlipidemia, Medication Refill, and Results       History of Present Illness (HPI)     Lina Kat is a 50 y.o. White female presenting in clinic today for Hyperlipidemia, Medication Refill, and Results. PMH of anxiety/depression (controlled), HTN, UBALDO with CPAP, tobacco use, VSG (3/1/2023 with Dr. Zhou Rubio). On 3/29/2023, she had an excisional biopsy of lipoma of right knee. She is followed by [facility] orthopedic and GYN clinics.     Patient presents today for follow up. She reports persistent fibrous right breast lump with increasing size and breast asymmetry. She has a positive family history of breast cancer. Mammogram with ultrasound is scheduled for 3/20/2025.     All pertinent labs dated 2025 reviewed and discussed with patient. Total cholesterol increased from 181 to 238, triglycerides from 256 to 273, and LDL from 88 to 145. B12 levels were elevated due to daily supplementation. Kidney and liver function tests were normal. Urine was turbid.    She has a family history of breast cancer, heart attacks, strokes, and Alzheimer's disease. She denies family history of colon or prostate cancer. She declines colonoscopy due to inability to tolerate the preparation solution, reporting significant nausea with previous attempts.     Continues to smoke 1 ppd - not ready to quit. Denies alcohol or illicit drug use. Denies chest pain, shortness of breath, cough, headache, dizziness, weakness, abdominal pain, nausea, vomiting, diarrhea, constipation, dysuria, depression, anxiety, SI, and HI.    Breast Cancer Screening - 2023: MMG-Birads 1. Repeat annually.   Cervical Cancer Screening- PAP 2023 - NIL. Follow up with GYN annually for Pelvic/Pap.  LDCT - Declines.  Osteoporosis Screening - Declines.  Colon Cancer Screening - Cologuard sample could not be processed on 3/13/2024. She reports  "having a repeat cologuard, but states "I can't do it, I would rather a colonoscopy." Referral placed to Endo .  Vaccines: Flu Not currently being offered, recommended annually. / Pneumonia 8/7/2018, 12/16/2022 / Tetanus 7/22/2019 / Shingles #1 - 4/17/2023, #2 - 8/17/2023     Review of Systems     Review of Systems   Constitutional: Negative.    HENT: Negative.     Eyes: Negative.    Respiratory: Negative.     Cardiovascular: Negative.    Gastrointestinal: Negative.    Endocrine: Negative.    Genitourinary: Negative.    Musculoskeletal: Negative.    Skin: Negative.    Allergic/Immunologic: Negative.    Neurological: Negative.    Hematological: Negative.    Psychiatric/Behavioral: Negative.     All other systems reviewed and are negative.      Medical / Social / Family History     Past Medical History:   Diagnosis Date    Anxiety     Depression     Hyperlipidemia     Hypertension     UBALDO (obstructive sleep apnea)          Past Surgical History:   Procedure Laterality Date    CHOLECYSTECTOMY      gastric sleeve  03/01/2023    SURGICAL REMOVAL OF MASS OF LOWER EXTREMITY Right 3/29/2023    Procedure: EXCISION, MASS, LOWER EXTREMITY;  Surgeon: Cody Joseph MD;  Location: [facility];  Service: General;  Laterality: Right;  Right lower extremity/ anterior knee    TUBAL LIGATION           Social History  Lina Kat's  reports that she quit smoking about 2 years ago. Her smoking use included cigarettes. She started smoking about 17 years ago. She has a 15 pack-year smoking history. She has never used smokeless tobacco. She reports that she does not drink alcohol and does not use drugs.    Family History  Lina Kat's family history includes Brain cancer in her maternal aunt and paternal uncle; Diabetes type II in her father; Heart attack in her father; Hypertension in her brother and mother.    Medications and Allergies     Medications  Current Outpatient Medications   Medication Instructions    albuterol " "(PROVENTIL/VENTOLIN HFA) 90 mcg/actuation inhaler 2 puffs, Inhalation, Every 6 hours PRN    atorvastatin (LIPITOR) 40 mg, Oral, Nightly    busPIRone (BUSPAR) 7.5 mg, Oral, 2 times daily    fenofibrate 160 mg, Oral, Daily    oxybutynin (DITROPAN) 5 mg, Oral, 2 times daily    pantoprazole (PROTONIX) 40 mg, Oral, Daily    sertraline (ZOLOFT) 200 mg, Oral, Daily       Allergies  Review of patient's allergies indicates:   Allergen Reactions    Codeine Swelling       Physical Examination   Visit Vitals  /87 (BP Location: Right arm, Patient Position: Sitting)   Pulse 80   Temp 98.2 °F (36.8 °C) (Oral)   Resp 18   Ht 5' 6" (1.676 m)   Wt 93.1 kg (205 lb 3.2 oz)   SpO2 96%   BMI 33.12 kg/m²     Physical Exam  Vitals reviewed.   Constitutional:       Appearance: Normal appearance. She is obese.   HENT:      Head: Normocephalic and atraumatic.      Right Ear: External ear normal.      Left Ear: External ear normal.      Nose: Nose normal.      Mouth/Throat:      Mouth: Mucous membranes are moist.      Pharynx: Oropharynx is clear.   Eyes:      Extraocular Movements: Extraocular movements intact.      Conjunctiva/sclera: Conjunctivae normal.      Pupils: Pupils are equal, round, and reactive to light.   Cardiovascular:      Rate and Rhythm: Normal rate and regular rhythm.      Pulses: Normal pulses.      Heart sounds: Normal heart sounds.   Pulmonary:      Effort: Pulmonary effort is normal.      Breath sounds: Normal breath sounds.   Chest:       Abdominal:      General: Bowel sounds are normal.      Palpations: Abdomen is soft.   Musculoskeletal:         General: Normal range of motion.      Cervical back: Normal range of motion and neck supple.   Skin:     General: Skin is warm and dry.      Capillary Refill: Capillary refill takes less than 2 seconds.   Neurological:      General: No focal deficit present.      Mental Status: She is alert and oriented to person, place, and time.   Psychiatric:         Mood and Affect: " "Mood normal.         Behavior: Behavior normal.         Thought Content: Thought content normal.         Judgment: Judgment normal.           Results     Lab Results   Component Value Date    WBC 8.09 02/12/2025    RBC 4.68 02/12/2025    HGB 14.8 02/12/2025    HCT 43.5 02/12/2025    MCV 92.9 02/12/2025    MCH 31.6 (H) 02/12/2025    MCHC 34.0 02/12/2025    RDW 12.7 02/12/2025     02/12/2025    MPV 10.4 02/12/2025      Lab Results   Component Value Date     02/12/2025    K 4.1 02/12/2025    CHLORIDE 109 (H) 02/10/2023    CO2 28 02/12/2025    GLUCOSE 94 02/12/2025    BUN 19.0 02/12/2025    CREATININE 0.89 02/12/2025    LABPROT 7.7 02/12/2025    ALBUMIN 4.1 02/12/2025    BILITOT 0.4 02/12/2025    ALKPHOS 62 02/12/2025    AST 17 02/12/2025    ALT 22 02/12/2025    AGAP 7.0 02/12/2025    EGFRNORACEVR >60 02/12/2025     Lab Results   Component Value Date    TSH 2.023 08/09/2024     Lab Results   Component Value Date    CHOL 238 (H) 02/12/2025    HDL 38 02/12/2025    .00 (H) 02/12/2025    TRIG 273 (H) 02/12/2025     Lab Results   Component Value Date    SGUA 1.016 02/12/2025    PROTEINUA Negative 02/12/2025    BILIRUBINUA Negative 02/12/2025    WBCUA 0-5 02/12/2025    RBCUA 0-5 02/12/2025    BACTERIA Trace (A) 02/12/2025    LEUKOCYTESUR Negative 02/12/2025    UROBILINOGEN Normal 02/12/2025     Lab Results   Component Value Date    CREATRANDUR 136.6 (H) 08/09/2024    MICALBCREAT  08/09/2024      Comment:      Unable to calculate     Lab Results   Component Value Date    JVDHSURY90KS 61 08/09/2024    FOLATE 18.6 02/12/2025     Lab Results   Component Value Date    HIV Nonreactive 04/14/2023    HEPAIGM Nonreactive 04/14/2023    HEPBSAG Nonreactive 04/14/2023    HEPCAB Nonreactive 04/14/2023     Lab Results   Component Value Date    COLOGUARD Sample Could Not Be Processed 3 03/13/2024     No results found for: "OCCBLDIA"    Assessment and Plan (including Health Maintenance)     Problem List Items Addressed " "This Visit          Pulmonary    Wheezing    Current Assessment & Plan   Symptoms controlled with albuterol - refilled today.           Relevant Medications    albuterol (PROVENTIL/VENTOLIN HFA) 90 mcg/actuation inhaler       Cardiac/Vascular    Hypertension    Current Assessment & Plan   Vitals:    03/07/25 0931   BP: 122/87   Pulse: 80   Resp: 18   Temp: 98.2 °F (36.8 °C)   TempSrc: Oral   SpO2: 96%   Weight: 93.1 kg (205 lb 3.2 oz)   Height: 5' 6" (1.676 m)     At goal.  Diet controlled.  Follow a low sodium (less than 2 grams of sodium per day), DASH diet.   Monitor blood pressure and report any consistent values greater than 140/90 and keep a log.  Encouraged smoking cessation to aid in BP reduction and co-morbidities.   Maintain healthy weight with a BMI goal of <30.   Aerobic exercise for 150 minutes per week (or 5 days a week for 30 minutes each day).          Relevant Orders    Urinalysis, Reflex to Urine Culture    Microalbumin/Creatinine Ratio, Urine    CBC Auto Differential    Mixed hyperlipidemia - Primary    Current Assessment & Plan   Lab Results   Component Value Date    .00 (H) 02/12/2025       Lab Results   Component Value Date    TRIG 273 (H) 02/12/2025       Lab Results   Component Value Date    HDL 38 02/12/2025        Lab Results   Component Value Date    CHOL 238 (H) 02/12/2025   Continue fenofibrate - refilled today.  D/c pravastatin, Rx atorvastatin (high dose).  Follow a low cholesterol, low saturated fat diet with less than 200 mg of cholesterol a day.   Avoid fried foods and high saturated fats.  Add flax seed or fish oil supplements to diet.   Increase dietary fiber.   Regular exercise improves cholesterol levels.  Physical activity 5 times a week for 30 minutes per day (or 150 minutes per week).   Stressed importance of dietary modifications.         Relevant Medications    atorvastatin (LIPITOR) 40 MG tablet    Other Relevant Orders    Lipid Panel    Comprehensive Metabolic " Panel       Renal/    Fibrous breast lumps    Current Assessment & Plan   Evaluated the patient's report of a long-standing breast lump that appears to be increasing in size.  Noted the previous assessment of the lump as fibrous.  Acknowledged the patient's concern about the lump and family history of breast cancer.  Scheduled mammogram and breast ultrasound for 3/20/2025 to further evaluate the breast lump.            ID    Immunization due    Current Assessment & Plan   Influenza vaccine was administered.  Ok to take acetaminophen for soreness of arm.          Relevant Medications    influenza (Flulaval, Fluzone, Fluarix) 45 mcg/0.5 mL IM vaccine (> or = 6 mo) 0.5 mL (Completed)       Endocrine    BMI 33.0-33.9,adult    Current Assessment & Plan   Body mass index is 33.12 kg/m².  Goal BMI <30.  Aerobic exercise 150 minutes per week.  Avoid soda, simple sugars, sweets, excessive rice, pasta, potatoes or bread.   Choose brown options when available and portion control.  Limit fast foods and fried foods.   Choose complex carbs in moderation (ex: green, leafy vegetables, beans, oatmeal).  Eat plenty of fresh fruits and vegetables with lean meats daily.   Consider permanent healthy lifestyle changes.          Relevant Orders    Vitamin D    TSH    T4, Free    Hemoglobin A1C       GI    Gastroesophageal reflux disease without esophagitis    Current Assessment & Plan   Avoid spicy, acidic, fried food and alcohol.    Eat 2-3 hours before bed.   Avoid tight fitting clothes and chew food thoroughly.    Reduce caffeine intake, avoid soda.    Take pantoprazole as prescribed - refilled today.    Encouraged continued smoking cessation.           Relevant Medications    pantoprazole (PROTONIX) 40 MG tablet       Palliative Care    Colon cancer screening declined    Current Assessment & Plan   Previously ordered cologuard - sample could not be processed.  Was scheduled for colonoscopy, but alleges she could not tolerate  "prep.  She declines any further screenings for colon cancer, states "I do not have any family h/o colon cancer."  Instructed the patient to monitor for any changes in stool, particularly hematochezia.  Advised the patient to contact the office if any changes in stool are observed, especially hematochezia.            Other    Tobacco user    Current Assessment & Plan   Smoking cessation discussed for 3 minutes.   Pt not ready to quit.  Declines referral to Smoking Cessation program.  Discussed benefits of quitting including improved health, decreased cardiac/vascular/pulmonary/stroke risks as well as saving money.   Declines LDCT.           PLAN SUMMARY:  Ordered mammogram and breast ultrasound for March 20th  Resubmitted prescription for Protonix for gastroesophageal reflux  Refilled albuterol  Discontinued Pravastatin  Initiated Atorvastatin for elevated cholesterol levels  Continued fenofibrate therapy           There are no preventive care reminders to display for this patient.    Tests to Keep You Healthy    Mammogram: Met on 8/21/2024  Colon Cancer Screening: DUE  Cervical Cancer Screening: Met on 4/28/2023  Last Blood Pressure <= 139/89 (3/7/2025): Yes      Health Maintenance Topics with due status: Not Due       Topic Last Completion Date    TETANUS VACCINE 07/22/2019    Cervical Cancer Screening 04/28/2023    Hemoglobin A1c (Prediabetes) 08/09/2024    Mammogram 08/21/2024    Lipid Panel 02/12/2025    RSV Vaccine (Age 60+ and Pregnant patients) Not Due       Future Appointments   Date Time Provider Department Center   3/20/2025  8:00 AM [id number] [place] [facility]   3/20/2025  8:45 AM [facility] CENTER US1 [facility] US [place]   8/20/2025  9:00 AM [facility] MAMMO2 SCR2 [facility] [place]   9/8/2025  7:20 AM Sheela Esparza, FNP [facility] [place]   2/2/2026  7:50 AM Faina Weinstein, ANP [facility] GYN [place]        Follow up in about 6 months (around " 9/7/2025) for F2F, Follow up, Med check, Lab review, Wellness, RTC PRN.          Signature:        RAMIRO Rodgers  [facility]  [address] [facility] [address]    Date of encounter: 3/7/25    This note was generated with the assistance of ambient listening technology. Verbal consent was obtained by the patient and accompanying visitor(s) for the recording of patient appointment to facilitate this note. I attest to having reviewed and edited the generated note for accuracy, though some syntax or spelling errors may persist. Please contact the author of this note for any clarification.

## 2025-03-07 NOTE — ASSESSMENT & PLAN NOTE
"Previously ordered cologuard - sample could not be processed.  Was scheduled for colonoscopy, but alleges she could not tolerate prep.  She declines any further screenings for colon cancer, states "I do not have any family h/o colon cancer."  Instructed the patient to monitor for any changes in stool, particularly hematochezia.  Advised the patient to contact the office if any changes in stool are observed, especially hematochezia.  "

## 2025-03-07 NOTE — PATIENT INSTRUCTIONS
REMINDER: Please complete labs within 1 week of appointment.   Please complete satisfaction survey when received. Thank you.    Bernardo Mills,     If you are due for any health screening(s) below please notify me so we can arrange them to be ordered and scheduled. Most healthy patients at your age complete them, but you are free to accept or refuse.     If you can't do it, I'll definitely understand. If you can, I'd certainly appreciate it!    Tests to Keep You Healthy    Mammogram: Met on 8/21/2024  Colon Cancer Screening: DUE  Cervical Cancer Screening: Met on 4/28/2023  Last Blood Pressure <= 139/89 (3/7/2025): Yes      Its time for your colon cancer screening     Colorectal cancer is one of the leading causes of cancer death for men and women but it doesnt have to be. Screenings can prevent colorectal cancer or find it early enough to treat and cure the disease.     Our records indicate that you may be overdue for colon cancer screening. A colonoscopy or stool screening test can help identify patients at risk for developing colon cancer. Cancer screenings save lives, so schedule yours today to stay healthy.     A colonoscopy is the preferred test for detecting colon cancer. It is needed only once every 10 years if results are negative. While you are sedated, a flexible, lighted tube with a tiny camera is inserted into the rectum and advanced through the colon to look for cancers.     An alternative screening test that is used at home and returned to the lab may also be used. It detects hidden blood in bowel movements which could indicate cancer in the colon. If results are positive, you will need a colonoscopy to determine if the blood is a sign of cancer. This type of follow up (diagnostic) colonoscopy usually requires additional copays as required by your insurance provider.     If you recently had your colon cancer screening performed outside of [facility], please let your Health care team know so  that they can update your health record. Please contact your PCP if you have any questions.

## 2025-03-07 NOTE — ASSESSMENT & PLAN NOTE
Evaluated the patient's report of a long-standing breast lump that appears to be increasing in size.  Noted the previous assessment of the lump as fibrous.  Acknowledged the patient's concern about the lump and family history of breast cancer.  Scheduled mammogram and breast ultrasound for 3/20/2025 to further evaluate the breast lump.

## 2025-03-20 ENCOUNTER — HOSPITAL ENCOUNTER (OUTPATIENT)
Dept: RADIOLOGY | Facility: HOSPITAL | Age: 50
Discharge: HOME | End: 2025-03-20
Attending: NURSE PRACTITIONER
Payer: MEDICAID

## 2025-03-20 VITALS — HEIGHT: 66 IN | WEIGHT: 205 LBS | BODY MASS INDEX: 32.95 KG/M2

## 2025-03-20 DIAGNOSIS — R92.321 SCATTERED FIBROGLANDULAR TISSUE DENSITY OF RIGHT BREAST ON MAMMOGRAPHY: ICD-10-CM

## 2025-03-20 PROCEDURE — 76642 ULTRASOUND BREAST LIMITED: CPT | Mod: TC,RT

## 2025-03-20 PROCEDURE — 77065 DX MAMMO INCL CAD UNI: CPT | Mod: 26,RT,, | Performed by: RADIOLOGY

## 2025-03-20 PROCEDURE — 77065 DX MAMMO INCL CAD UNI: CPT | Mod: TC,RT

## 2025-03-20 PROCEDURE — 77061 BREAST TOMOSYNTHESIS UNI: CPT | Mod: 26,RT,, | Performed by: RADIOLOGY

## 2025-03-20 PROCEDURE — 76642 ULTRASOUND BREAST LIMITED: CPT | Mod: 26,RT,, | Performed by: RADIOLOGY

## 2025-07-15 ENCOUNTER — PATIENT MESSAGE (OUTPATIENT)
Facility: CLINIC | Age: 50
End: 2025-07-15
Payer: MEDICAID

## (undated) DEVICE — HANDLE DEVON RIGID OR LIGHT

## (undated) DEVICE — GAUZE SPONGE 4X4 12PLY

## (undated) DEVICE — GLOVE PROTEXIS LTX MICRO  7.5

## (undated) DEVICE — SOL 9P NACL IRR PIC IL

## (undated) DEVICE — MANIFOLD 4 PORT

## (undated) DEVICE — GLOVE PROTEXIS LTX MICRO  7

## (undated) DEVICE — GLOVE PROTEXIS BLUE LATEX 7.5

## (undated) DEVICE — GOWN POLY REINF BRTH SLV XL

## (undated) DEVICE — SUT 2-0 VICRYL / SH (J417)

## (undated) DEVICE — SUT 2-0 ETHILON 18 FS

## (undated) DEVICE — DRAPE EXTREMITY STD 89X128IN

## (undated) DEVICE — DRESSING TELFA STRL 4X3 LF

## (undated) DEVICE — APPLICATOR CHLORAPREP ORN 26ML

## (undated) DEVICE — GLOVE PROTEXIS LTX MICRO 6.5

## (undated) DEVICE — YANKAUER FLEX NO VENT REG CAP

## (undated) DEVICE — GOWN SMARTSLEEVE AAMI LVL4 LG

## (undated) DEVICE — GLOVE PROTEXIS BLUE LATEX 7

## (undated) DEVICE — ADHESIVE DERMABOND ADVANCED

## (undated) DEVICE — SUT 3-0 MONOCRYL PLUS PS-2

## (undated) DEVICE — Device

## (undated) DEVICE — GLOVE PROTEXIS BLUE LATEX 6.5